# Patient Record
Sex: MALE | Race: WHITE | NOT HISPANIC OR LATINO | Employment: FULL TIME | URBAN - METROPOLITAN AREA
[De-identification: names, ages, dates, MRNs, and addresses within clinical notes are randomized per-mention and may not be internally consistent; named-entity substitution may affect disease eponyms.]

---

## 2017-11-10 ENCOUNTER — HOSPITAL ENCOUNTER (OUTPATIENT)
Facility: MEDICAL CENTER | Age: 37
End: 2017-11-10
Payer: COMMERCIAL

## 2017-11-10 LAB
ANION GAP SERPL CALC-SCNC: 7 MMOL/L (ref 0–11.9)
BUN SERPL-MCNC: 21 MG/DL (ref 8–22)
CALCIUM SERPL-MCNC: 9.2 MG/DL (ref 8.5–10.5)
CHLORIDE SERPL-SCNC: 102 MMOL/L (ref 96–112)
CHOLEST SERPL-MCNC: 166 MG/DL (ref 100–199)
CO2 SERPL-SCNC: 26 MMOL/L (ref 20–33)
CREAT SERPL-MCNC: 1.19 MG/DL (ref 0.5–1.4)
GFR SERPL CREATININE-BSD FRML MDRD: >60 ML/MIN/1.73 M 2
GLUCOSE SERPL-MCNC: 319 MG/DL (ref 65–99)
HDLC SERPL-MCNC: 47 MG/DL
LDLC SERPL CALC-MCNC: 74 MG/DL
POTASSIUM SERPL-SCNC: 4.7 MMOL/L (ref 3.6–5.5)
SODIUM SERPL-SCNC: 135 MMOL/L (ref 135–145)
TRIGL SERPL-MCNC: 227 MG/DL (ref 0–149)

## 2017-12-01 ENCOUNTER — OFFICE VISIT (OUTPATIENT)
Dept: URGENT CARE | Facility: PHYSICIAN GROUP | Age: 37
End: 2017-12-01
Payer: COMMERCIAL

## 2017-12-01 VITALS
TEMPERATURE: 99.9 F | SYSTOLIC BLOOD PRESSURE: 122 MMHG | HEART RATE: 97 BPM | OXYGEN SATURATION: 99 % | BODY MASS INDEX: 24.39 KG/M2 | WEIGHT: 184 LBS | RESPIRATION RATE: 16 BRPM | HEIGHT: 73 IN | DIASTOLIC BLOOD PRESSURE: 76 MMHG

## 2017-12-01 DIAGNOSIS — R68.89 FLU-LIKE SYMPTOMS: Primary | ICD-10-CM

## 2017-12-01 LAB
FLUAV+FLUBV AG SPEC QL IA: NEGATIVE
INT CON NEG: NORMAL
INT CON POS: NORMAL

## 2017-12-01 PROCEDURE — 87804 INFLUENZA ASSAY W/OPTIC: CPT | Performed by: NURSE PRACTITIONER

## 2017-12-01 PROCEDURE — 99203 OFFICE O/P NEW LOW 30 MIN: CPT | Performed by: NURSE PRACTITIONER

## 2017-12-01 RX ORDER — OSELTAMIVIR PHOSPHATE 75 MG/1
75 CAPSULE ORAL 2 TIMES DAILY
Qty: 10 CAP | Refills: 0 | Status: SHIPPED | OUTPATIENT
Start: 2017-12-01 | End: 2018-02-07

## 2017-12-01 RX ORDER — FLUTICASONE PROPIONATE 50 MCG
1 SPRAY, SUSPENSION (ML) NASAL DAILY
COMMUNITY

## 2017-12-01 RX ORDER — PSEUDOEPHEDRINE HCL 30 MG
60 TABLET ORAL EVERY 4 HOURS PRN
COMMUNITY
End: 2018-02-07

## 2017-12-01 RX ORDER — LORATADINE 10 MG/1
10 TABLET ORAL DAILY
COMMUNITY
End: 2024-02-24

## 2017-12-01 ASSESSMENT — ENCOUNTER SYMPTOMS
VOMITING: 0
FEVER: 1
COUGH: 1
MYALGIAS: 1
RHINORRHEA: 1
DIARRHEA: 0
CHILLS: 1
WEAKNESS: 1
SORE THROAT: 1
SHORTNESS OF BREATH: 0
NAUSEA: 0
SPUTUM PRODUCTION: 1

## 2017-12-01 ASSESSMENT — COPD QUESTIONNAIRES: COPD: 0

## 2017-12-01 NOTE — PATIENT INSTRUCTIONS
"Influenza, Adult  Influenza (\"the flu\") is a viral infection of the respiratory tract. It occurs more often in winter months because people spend more time in close contact with one another. Influenza can make you feel very sick. Influenza easily spreads from person to person (contagious).  CAUSES   Influenza is caused by a virus that infects the respiratory tract. You can catch the virus by breathing in droplets from an infected person's cough or sneeze. You can also catch the virus by touching something that was recently contaminated with the virus and then touching your mouth, nose, or eyes.  RISKS AND COMPLICATIONS  You may be at risk for a more severe case of influenza if you smoke cigarettes, have diabetes, have chronic heart disease (such as heart failure) or lung disease (such as asthma), or if you have a weakened immune system. Elderly people and pregnant women are also at risk for more serious infections. The most common problem of influenza is a lung infection (pneumonia). Sometimes, this problem can require emergency medical care and may be life threatening.  SIGNS AND SYMPTOMS   Symptoms typically last 4 to 10 days and may include:  · Fever.  · Chills.  · Headache, body aches, and muscle aches.  · Sore throat.  · Chest discomfort and cough.  · Poor appetite.  · Weakness or feeling tired.  · Dizziness.  · Nausea or vomiting.  DIAGNOSIS   Diagnosis of influenza is often made based on your history and a physical exam. A nose or throat swab test can be done to confirm the diagnosis.  TREATMENT   In mild cases, influenza goes away on its own. Treatment is directed at relieving symptoms. For more severe cases, your health care provider may prescribe antiviral medicines to shorten the sickness. Antibiotic medicines are not effective because the infection is caused by a virus, not by bacteria.  HOME CARE INSTRUCTIONS  · Take medicines only as directed by your health care provider.  · Use a cool mist humidifier " to make breathing easier.  · Get plenty of rest until your temperature returns to normal. This usually takes 3 to 4 days.  · Drink enough fluid to keep your urine clear or pale yellow.  · Cover your mouth and nose when coughing or sneezing, and wash your hands well to prevent the virus from spreading.  · Stay home from work or school until the fever is gone for at least 1 full day.  PREVENTION   An annual influenza vaccination (flu shot) is the best way to avoid getting influenza. An annual flu shot is now routinely recommended for all adults in the [place]  SEEK MEDICAL CARE IF:  · You experience chest pain, your cough worsens, or you produce more mucus.  · You have nausea, vomiting, or diarrhea.  · Your fever returns or gets worse.  SEEK IMMEDIATE MEDICAL CARE IF:  · You have trouble breathing, you become short of breath, or your skin or nails become bluish.  · You have severe pain or stiffness in the neck.  · You develop a sudden headache, or pain in the face or ear.  · You have nausea or vomiting that you cannot control.  MAKE SURE YOU:   · Understand these instructions.  · Will watch your condition.  · Will get help right away if you are not doing well or get worse.     This information is not intended to replace advice given to you by your health care provider. Make sure you discuss any questions you have with your health care provider.     Document Released: 12/15/2001 Document Revised: 01/08/2016 Document Reviewed: 03/18/2013  [employer] Interactive Patient Education ©2016 [employer]

## 2017-12-01 NOTE — PROGRESS NOTES
"Subjective:      Jose De Jesus Banda is a 36 y.o. male who presents with Cough (congestion, fever x 2 days)            Medications, Allergies and Prior Medical Hx reviewed and updated in [facility].with patient/family today           Cough   This is a new problem. The current episode started in the past 7 days (2 days). The problem has been unchanged. The cough is productive of sputum. Associated symptoms include chills, a fever, myalgias, nasal congestion, rhinorrhea and a sore throat. Pertinent negatives include no ear pain or shortness of breath. Nothing aggravates the symptoms. He has tried nothing for the symptoms. The treatment provided no relief. There is no history of asthma, COPD or emphysema.       Review of Systems   Constitutional: Positive for chills, fever and malaise/fatigue.   HENT: Positive for congestion, rhinorrhea and sore throat. Negative for ear discharge and ear pain.    Respiratory: Positive for cough and sputum production. Negative for shortness of breath.    Gastrointestinal: Negative for diarrhea, nausea and vomiting.   Musculoskeletal: Positive for myalgias.   Neurological: Positive for weakness.          Objective:     /76   Pulse 97   Temp 37.7 °C (99.9 °F)   Resp 16   Ht 1.854 m (6' 1\")   Wt 83.5 kg (184 lb)   SpO2 99%   BMI 24.28 kg/m²      Physical Exam   Constitutional: He appears well-developed and well-nourished. No distress.   HENT:   Head: Normocephalic and atraumatic.   Right Ear: Tympanic membrane and ear canal normal.   Left Ear: Tympanic membrane and ear canal normal.   Nose: Rhinorrhea present.   Mouth/Throat: Uvula is midline and mucous membranes are normal. No trismus in the jaw. No uvula swelling. Posterior oropharyngeal edema and posterior oropharyngeal erythema present. No oropharyngeal exudate.   Eyes: Conjunctivae are normal. Pupils are equal, round, and reactive to light.   Neck: Neck supple.   Cardiovascular: Normal rate, regular rhythm and normal heart sounds.  "   Pulmonary/Chest: Effort normal and breath sounds normal. No respiratory distress. He has no wheezes.   Lymphadenopathy:     He has cervical adenopathy.   Neurological: He is alert.   Skin: Skin is warm and dry.   Psychiatric: He has a normal mood and affect. His behavior is normal.   Vitals reviewed.              Assessment/Plan:       1. Flu-like symptoms  POCT Influenza A/B    oseltamivir (TAMIFLU) 75 MG Cap       poct flu - neg    Flu swab neg - sx are compatible with the flu, onset of sx 2 days will tx as flu     Letter written for 2-3 days off of school/work    Modified Epic generated written imformation provided along with verbal instructions    Rest, Fluids, tylenol, ibuprofen,  humidified air, and otc decongestants  Pt will go to the ER for worsening or changing symptoms as discussed,   Follow-up with your primary care provider or return here if not improving in 5 - 7 days   Discharge instructions discussed with pt/family who verbalize understanding and agreement with poc

## 2017-12-01 NOTE — LETTER
December 1, 2017         Patient: Jose De Jesus Banda   YOB: 1980   Date of Visit: 12/1/2017           To Whom it May Concern:    Jose De Jesus Banda was seen in my clinic on 12/1/2017. He should be off work for 2-3 days due to illness.     If you have any questions or concerns, please don't hesitate to call.        Sincerely,           JOEL Lewis.  Electronically Signed

## 2018-02-07 ENCOUNTER — OFFICE VISIT (OUTPATIENT)
Dept: MEDICAL GROUP | Facility: PHYSICIAN GROUP | Age: 38
End: 2018-02-07
Payer: COMMERCIAL

## 2018-02-07 VITALS
DIASTOLIC BLOOD PRESSURE: 80 MMHG | SYSTOLIC BLOOD PRESSURE: 112 MMHG | OXYGEN SATURATION: 98 % | WEIGHT: 177 LBS | HEART RATE: 78 BPM | RESPIRATION RATE: 16 BRPM | HEIGHT: 73 IN | TEMPERATURE: 97.6 F | BODY MASS INDEX: 23.46 KG/M2

## 2018-02-07 DIAGNOSIS — Z76.89 ENCOUNTER TO ESTABLISH CARE WITH NEW DOCTOR: ICD-10-CM

## 2018-02-07 DIAGNOSIS — E11.8 TYPE 2 DIABETES MELLITUS WITH COMPLICATION, WITHOUT LONG-TERM CURRENT USE OF INSULIN (HCC): ICD-10-CM

## 2018-02-07 DIAGNOSIS — E78.1 HYPERTRIGLYCERIDEMIA: ICD-10-CM

## 2018-02-07 DIAGNOSIS — Z23 NEED FOR VACCINATION: ICD-10-CM

## 2018-02-07 LAB
HBA1C MFR BLD: 13 % (ref ?–5.8)
INT CON NEG: NEGATIVE
INT CON POS: POSITIVE

## 2018-02-07 PROCEDURE — 83036 HEMOGLOBIN GLYCOSYLATED A1C: CPT | Performed by: NURSE PRACTITIONER

## 2018-02-07 PROCEDURE — 99214 OFFICE O/P EST MOD 30 MIN: CPT | Mod: 25 | Performed by: NURSE PRACTITIONER

## 2018-02-07 PROCEDURE — 90715 TDAP VACCINE 7 YRS/> IM: CPT | Performed by: NURSE PRACTITIONER

## 2018-02-07 PROCEDURE — 90471 IMMUNIZATION ADMIN: CPT | Performed by: NURSE PRACTITIONER

## 2018-02-07 ASSESSMENT — PATIENT HEALTH QUESTIONNAIRE - PHQ9: CLINICAL INTERPRETATION OF PHQ2 SCORE: 0

## 2018-02-07 NOTE — ASSESSMENT & PLAN NOTE
This is a new problem.  Has wellness labs done for work every year and his blood sugar is always high.  This year it was 319.  He reports that both of his parents have diabetes.   Discussed that he has diabetes, and that he will need to take this more seriously.  A POCT A1c was 13%.  He will start metformin 1000 twice a day, and he will start checking his blood sugars as needed.  He was given a glucometer and instructed how to use it. He will plan to recheck labs in 3 months.

## 2018-02-07 NOTE — PROGRESS NOTES
Chief Complaint   Patient presents with   • Follow-Up     lab work    • Tired   • Immunizations     tdap       HISTORY OF PRESENT ILLNESS: Patient is a 37 y.o. male new patient who presents today to discuss the following issues:    Need for vaccination  Due for Tdap.    Encounter to establish care with new doctor  Is here to establish with a new primary care provider.     Type 2 diabetes mellitus with complication, without long-term current use of insulin (CMS-HCC)  This is a new problem.  Has wellness labs done for work every year and his blood sugar is always high.  This year it was 319.  He reports that both of his parents have diabetes.   Discussed that he has diabetes, and that he will need to take this more seriously.  A POCT A1c was 13%.  He will start metformin 1000 twice a day, and he will start checking his blood sugars as needed.  He was given a glucometer and instructed how to use it. He will plan to recheck labs in 3 months.    Hypertriglyceridemia  Triglycerides were 227 this year.  Discussed diet and lifestyle changes.  He will recheck labs in 3 months.      Patient Active Problem List    Diagnosis Date Noted   • Encounter to establish care with new doctor 02/07/2018   • Need for vaccination 02/07/2018   • Type 2 diabetes mellitus with complication, without long-term current use of insulin (CMS-HCC) 02/07/2018   • Hypertriglyceridemia 02/07/2018       Allergies:Patient has no known allergies.    Current Outpatient Prescriptions   Medication Sig Dispense Refill   • metformin (GLUCOPHAGE) 1000 MG tablet Take 1 Tab by mouth 2 times a day, with meals. 60 Tab 3   • glucose blood (JACQUELINE CONTOUR NEXT TEST) strip 1 Strip by Other route as needed. 100 Strip 3   • loratadine (CLARITIN) 10 MG Tab Take 10 mg by mouth every day.     • fluticasone (FLONASE) 50 MCG/ACT nasal spray Spray 1 Spray in nose every day.       No current facility-administered medications for this visit.        Social History   Substance  "Use Topics   • Smoking status: Never Smoker   • Smokeless tobacco: Never Used   • Alcohol use Yes       No family status information on file.   History reviewed. No pertinent family history.    Review of Systems:   Constitutional: Negative for fever, chills, weight loss and malaise/fatigue.   HENT: Negative for ear pain, nosebleeds, congestion, sore throat and neck pain.    Eyes: Negative for blurred vision.   Respiratory: Negative for cough, sputum production, shortness of breath and wheezing.    Cardiovascular: Negative for chest pain, palpitations, orthopnea and leg swelling.   Gastrointestinal: Negative for heartburn, nausea, vomiting and abdominal pain.   Genitourinary: Negative for dysuria, urgency and frequency.   Musculoskeletal: Negative for myalgias, joint pain, and back pain.  Skin: Negative for rash and itching.   Neurological: Negative for dizziness, tingling, tremors, sensory change, focal weakness and headaches.   Endo/Heme/Allergies: Does not bruise/bleed easily.   Psychiatric/Behavioral: Negative for depression, suicidal ideas and memory loss.  The patient is not nervous/anxious and does not have insomnia.    All other systems reviewed and are negative except as in HPI.    Exam:  Blood pressure 112/80, pulse 78, temperature 36.4 °C (97.6 °F), resp. rate 16, height 1.854 m (6' 1\"), weight 80.3 kg (177 lb), SpO2 98 %.  General:  Well nourished, well developed male in NAD  Head: Grossly normal.  Neck: Supple without JVD or bruit. Thyroid is not enlarged.  Pulmonary: Clear to ausculation. Normal effort. No rales, ronchi, or wheezing.  Cardiovascular: Regular rate and rhythm without murmur.   Abdomen:  Bowel sounds + x 4. Soft, non-tender, nondistended.  Extremities: No clubbing, cyanosis, or edema.  Skin: Intact with no obvious rashes or lesions.  Neuro: Grossly intact.  Psych: Alert and oriented x 3.  Mood and affect appropriate.    Medical decision-making and discussion: Jose De Jesus is here to establish " with a new primary care provider.  We reviewed his past medical history and discussed his current medications. A POCT A1c was 13%.  A prescription for metformin was sent to his pharmacy, he was given a glucometer, lab work was ordered to be done in 3 months, and he was given a Tdap.  He will sign a records release for his previous provider, he will sign up with MyChart, and he will plan to follow-up here as needed.     I have placed the below orders and discussed them with an approved delegating provider. The MA is performing the below orders under the direction of Dr. Greer, who have provided verbal consent for supervision.    Time: Greater than 50% of this 50 minute appointment was spent face-to-face providing counseling, coordination of care, etc.            Assessment/Plan:  1. Need for vaccination  TDAP VACCINE =>6YO IM   2. Encounter to establish care with new doctor     3. Type 2 diabetes mellitus with complication, without long-term current use of insulin (CMS-[facility])  metformin (GLUCOPHAGE) 1000 MG tablet    POCT Hemoglobin A1C    glucose blood (JACQUELINE CONTOUR NEXT TEST) strip    CBC WITH DIFFERENTIAL    COMP METABOLIC PANEL    HEMOGLOBIN A1C    LIPID PROFILE   4. Hypertriglyceridemia  LIPID PROFILE       Return in about 3 months (around 5/7/2018), or if symptoms worsen or fail to improve.    Please note that this dictation was created using voice recognition software. I have made every reasonable attempt to correct obvious errors, but I expect that there are errors of grammar and possibly content that I did not discover before finalizing the note.

## 2018-02-07 NOTE — ASSESSMENT & PLAN NOTE
Triglycerides were 227 this year.  Discussed diet and lifestyle changes.  He will recheck labs in 3 months.

## 2018-05-09 LAB — HBA1C MFR BLD: 11.4 % (ref ?–5.8)

## 2018-05-11 LAB
ALBUMIN SERPL-MCNC: 4.4 G/DL (ref 3.5–5.5)
ALBUMIN/GLOB SERPL: 1.9 {RATIO} (ref 1.2–2.2)
ALP SERPL-CCNC: 44 IU/L (ref 39–117)
ALT SERPL-CCNC: 25 IU/L (ref 0–44)
AST SERPL-CCNC: 19 IU/L (ref 0–40)
BASOPHILS # BLD AUTO: 0 X10E3/UL (ref 0–0.2)
BASOPHILS NFR BLD AUTO: 0 %
BILIRUB SERPL-MCNC: 0.5 MG/DL (ref 0–1.2)
BUN SERPL-MCNC: 18 MG/DL (ref 6–20)
BUN/CREAT SERPL: 16 (ref 9–20)
CALCIUM SERPL-MCNC: 9.1 MG/DL (ref 8.7–10.2)
CHLORIDE SERPL-SCNC: 100 MMOL/L (ref 96–106)
CHOLEST SERPL-MCNC: 171 MG/DL (ref 100–199)
CO2 SERPL-SCNC: 25 MMOL/L (ref 18–29)
CREAT SERPL-MCNC: 1.11 MG/DL (ref 0.76–1.27)
EOSINOPHIL # BLD AUTO: 0.1 X10E3/UL (ref 0–0.4)
EOSINOPHIL NFR BLD AUTO: 2 %
ERYTHROCYTE [DISTWIDTH] IN BLOOD BY AUTOMATED COUNT: 13.4 % (ref 12.3–15.4)
GFR SERPLBLD CREATININE-BSD FMLA CKD-EPI: 86 ML/MIN/1.73
GFR SERPLBLD CREATININE-BSD FMLA CKD-EPI: 99 ML/MIN/1.73
GLOBULIN SER CALC-MCNC: 2.3 G/DL (ref 1.5–4.5)
GLUCOSE SERPL-MCNC: 306 MG/DL (ref 65–99)
HBA1C MFR BLD: 11.4 % (ref 4.8–5.6)
HCT VFR BLD AUTO: 45.8 % (ref 37.5–51)
HDLC SERPL-MCNC: 45 MG/DL
HGB BLD-MCNC: 15.1 G/DL (ref 13–17.7)
IMM GRANULOCYTES # BLD: 0 X10E3/UL (ref 0–0.1)
IMM GRANULOCYTES NFR BLD: 0 %
IMMATURE CELLS  115398: NORMAL
LABORATORY COMMENT REPORT: ABNORMAL
LDLC SERPL CALC-MCNC: 93 MG/DL (ref 0–99)
LYMPHOCYTES # BLD AUTO: 2.4 X10E3/UL (ref 0.7–3.1)
LYMPHOCYTES NFR BLD AUTO: 46 %
MCH RBC QN AUTO: 30.7 PG (ref 26.6–33)
MCHC RBC AUTO-ENTMCNC: 33 G/DL (ref 31.5–35.7)
MCV RBC AUTO: 93 FL (ref 79–97)
MONOCYTES # BLD AUTO: 0.4 X10E3/UL (ref 0.1–0.9)
MONOCYTES NFR BLD AUTO: 8 %
MORPHOLOGY BLD-IMP: NORMAL
NEUTROPHILS # BLD AUTO: 2.3 X10E3/UL (ref 1.4–7)
NEUTROPHILS NFR BLD AUTO: 44 %
NRBC BLD AUTO-RTO: NORMAL %
PLATELET # BLD AUTO: 207 X10E3/UL (ref 150–379)
POTASSIUM SERPL-SCNC: 4.3 MMOL/L (ref 3.5–5.2)
PROT SERPL-MCNC: 6.7 G/DL (ref 6–8.5)
RBC # BLD AUTO: 4.92 X10E6/UL (ref 4.14–5.8)
SODIUM SERPL-SCNC: 138 MMOL/L (ref 134–144)
TRIGL SERPL-MCNC: 167 MG/DL (ref 0–149)
VLDLC SERPL CALC-MCNC: 33 MG/DL (ref 5–40)
WBC # BLD AUTO: 5.2 X10E3/UL (ref 3.4–10.8)

## 2018-05-22 ENCOUNTER — OFFICE VISIT (OUTPATIENT)
Dept: MEDICAL GROUP | Facility: PHYSICIAN GROUP | Age: 38
End: 2018-05-22
Payer: COMMERCIAL

## 2018-05-22 VITALS
WEIGHT: 182 LBS | OXYGEN SATURATION: 98 % | HEART RATE: 86 BPM | DIASTOLIC BLOOD PRESSURE: 74 MMHG | TEMPERATURE: 98.3 F | HEIGHT: 73 IN | SYSTOLIC BLOOD PRESSURE: 122 MMHG | BODY MASS INDEX: 24.12 KG/M2 | RESPIRATION RATE: 16 BRPM

## 2018-05-22 DIAGNOSIS — E11.8 TYPE 2 DIABETES MELLITUS WITH COMPLICATION, WITHOUT LONG-TERM CURRENT USE OF INSULIN (HCC): ICD-10-CM

## 2018-05-22 PROBLEM — Z76.89 ENCOUNTER TO ESTABLISH CARE WITH NEW DOCTOR: Status: RESOLVED | Noted: 2018-02-07 | Resolved: 2018-05-22

## 2018-05-22 PROBLEM — Z23 NEED FOR VACCINATION: Status: RESOLVED | Noted: 2018-02-07 | Resolved: 2018-05-22

## 2018-05-22 PROCEDURE — 99214 OFFICE O/P EST MOD 30 MIN: CPT | Performed by: NURSE PRACTITIONER

## 2018-05-22 NOTE — ASSESSMENT & PLAN NOTE
Blood sugars are typically high 200s to 300s.  Recent A1c was 11.4%, which is down from 13%.  Discussed options.  Will proceed with referral to endocrinology.

## 2018-05-22 NOTE — PROGRESS NOTES
Chief Complaint   Patient presents with   • Results     labs       HISTORY OF PRESENT ILLNESS: Patient is a 37 y.o. male established patient who presents today to discuss the following issues:    Type 2 diabetes mellitus with complication, without long-term current use of insulin (CMS-HCC) ([facility])  Blood sugars are typically high 200s to 300s.  Recent A1c was 11.4%, which is down from 13%.  Discussed options.  Will proceed with referral to endocrinology.       Patient Active Problem List    Diagnosis Date Noted   • Type 2 diabetes mellitus with complication, without long-term current use of insulin ([facility]) 02/07/2018   • Hypertriglyceridemia 02/07/2018       Allergies:Patient has no known allergies.    Current Outpatient Prescriptions   Medication Sig Dispense Refill   • metformin (GLUCOPHAGE) 1000 MG tablet TAKE 1 TAB BY MOUTH 2 TIMES A DAY, WITH MEALS. 180 Tab 1   • glucose blood (JACQUELINE CONTOUR NEXT TEST) strip 1 Strip by Other route as needed. 100 Strip 3   • loratadine (CLARITIN) 10 MG Tab Take 10 mg by mouth every day.     • fluticasone (FLONASE) 50 MCG/ACT nasal spray Spray 1 Spray in nose every day.       No current facility-administered medications for this visit.        Social History   Substance Use Topics   • Smoking status: Never Smoker   • Smokeless tobacco: Never Used   • Alcohol use Yes       No family status information on file.   History reviewed. No pertinent family history.    Review of Systems:   Constitutional: Negative for fever, chills, weight loss and malaise/fatigue.   HENT: Negative for ear pain, nosebleeds, congestion, sore throat and neck pain.    Eyes: Negative for blurred vision.   Respiratory: Negative for cough, sputum production, shortness of breath and wheezing.    Cardiovascular: Negative for chest pain, palpitations, orthopnea and leg swelling.   Gastrointestinal: Negative for heartburn, nausea, vomiting and abdominal pain.   Genitourinary: Negative for dysuria, urgency and frequency.  "  Musculoskeletal: Negative for myalgias, joint pain, and back pain.  Skin: Negative for rash and itching.   Neurological: Negative for dizziness, tingling, tremors, sensory change, focal weakness and headaches.   Endo/Heme/Allergies: Does not bruise/bleed easily.   Psychiatric/Behavioral: Negative for depression, suicidal ideas and memory loss.  The patient is not nervous/anxious and does not have insomnia.    All other systems reviewed and are negative except as in HPI.    Exam:  Blood pressure 122/74, pulse 86, temperature 36.8 °C (98.3 °F), resp. rate 16, height 1.854 m (6' 1\"), weight 82.6 kg (182 lb), SpO2 98 %.  General:  Well nourished, well developed male in NAD  Head: Grossly normal.  Neck: Supple without JVD or bruit. Thyroid is not enlarged.  Pulmonary: Clear to ausculation. Normal effort. No rales, ronchi, or wheezing.  Cardiovascular: Regular rate and rhythm without murmur.   Abdomen:  Soft, nontender, nondistended.  Extremities: No clubbing, cyanosis, or edema.  Skin: Intact with no obvious rashes or lesions.  Neuro: Grossly intact.  Psych: Alert and oriented x 3.  Mood and affect appropriate.    Medical decision-making and discussion: Jose De Jesus is here today for follow-up.  We reviewed recent lab results and a referral was sent to endocrinology.  He will follow-up here as needed.          Assessment/Plan:  1. Type 2 diabetes mellitus with complication, without long-term current use of insulin (HCC)  REFERRAL TO ENDOCRINOLOGY       Return if symptoms worsen or fail to improve.    Please note that this dictation was created using voice recognition software. I have made every reasonable attempt to correct obvious errors, but I expect that there are errors of grammar and possibly content that I did not discover before finalizing the note.  "

## 2018-06-01 DIAGNOSIS — E11.8 TYPE 2 DIABETES MELLITUS WITH COMPLICATION, WITHOUT LONG-TERM CURRENT USE OF INSULIN (HCC): ICD-10-CM

## 2018-06-01 NOTE — TELEPHONE ENCOUNTER
Patient has recently been seen by PCP within the last 6 months per protocol (5/18). Will refill medications for 6 months.  Lab Results   Component Value Date/Time    HBA1C 11.4 (H) 05/09/2018 07:34 AM    HBA1C 11.4 05/09/2018      No results found for: MICROALBCALC, MALBCRT, MALBEXCR, IFTGDY98, MICROALBUR, MICRALB, UMICROALBUM, MICROALBTIM   Lab Results   Component Value Date/Time    ALKPHOSPHAT 44 05/09/2018 07:34 AM    ASTSGOT 19 05/09/2018 07:34 AM    ALTSGPT 25 05/09/2018 07:34 AM    TBILIRUBIN 0.5 05/09/2018 07:34 AM

## 2018-06-06 DIAGNOSIS — E11.8 TYPE 2 DIABETES MELLITUS WITH COMPLICATION, WITHOUT LONG-TERM CURRENT USE OF INSULIN (HCC): ICD-10-CM

## 2018-07-23 ENCOUNTER — OFFICE VISIT (OUTPATIENT)
Dept: ENDOCRINOLOGY | Facility: MEDICAL CENTER | Age: 38
End: 2018-07-23
Payer: COMMERCIAL

## 2018-07-23 VITALS
DIASTOLIC BLOOD PRESSURE: 82 MMHG | WEIGHT: 182.2 LBS | OXYGEN SATURATION: 97 % | HEIGHT: 73 IN | HEART RATE: 74 BPM | BODY MASS INDEX: 24.15 KG/M2 | SYSTOLIC BLOOD PRESSURE: 130 MMHG

## 2018-07-23 DIAGNOSIS — E11.8 TYPE 2 DIABETES MELLITUS WITH COMPLICATION, WITHOUT LONG-TERM CURRENT USE OF INSULIN (HCC): ICD-10-CM

## 2018-07-23 LAB
HBA1C MFR BLD: 10.4 % (ref ?–5.8)
INT CON NEG: NORMAL
INT CON POS: NORMAL

## 2018-07-23 PROCEDURE — 83036 HEMOGLOBIN GLYCOSYLATED A1C: CPT | Performed by: PHYSICIAN ASSISTANT

## 2018-07-23 PROCEDURE — 99203 OFFICE O/P NEW LOW 30 MIN: CPT | Performed by: PHYSICIAN ASSISTANT

## 2018-07-23 NOTE — PATIENT INSTRUCTIONS
STOP:  1.  Metformin    START  1.  Xigduo 5/1000 one in the AM one int eh PM  2.  Ozempic 0.25 for three weeks once a week.    Get eyes checked

## 2018-07-23 NOTE — PROGRESS NOTES
New Patient Consult Note  Referred by: IZZY Zelaya    Reason for consult: Diabetes Management Type 2    HPI:  Jose De Jesus Banda is a 37 y.o. old patient who is seeing us today for diabetes care.  This is a pleasant patient with diabetes and I appreciate the opportunity to participate in the care of this patient.  This is a new patient with me today.    Labs of 7/23/18 HbA1c 10.4  Labs of 5/9/18 HbA1c is 11.4, HDL 45, LDL 93, GFR 86, glucose 306  Labs 2/7/18 HbA1c was 13.0  Labs of 10/11/13 glucose 317  Labs of 10/19/12 glucose 247    BG Diary:7/23/2018  In the AM:  No log    Has been Diabetic since T2 2/2018  Has a Glucagon pen at home: no    1. Type 2 diabetes mellitus with complication, without long-term current use of insulin ([facility])  This is a new patient with me on 7/23/18   He is on   1. Metformin 1000 BID    STOP:  1.  Metformin    START  1.  Xigduo 5/1000 one in the AM one int eh PM  2.  Ozempic 0.25 for three weeks once a week.    Get eyes checked      ROS:   Constitutional: No change in weight , No fatigue, No night sweats.  HEENT: No Headache.  Eyes:  No blurred vision, No visual changes.  Cardiac: No chest pain, No palpitations.  Resp: No shortness of breath, No cough,   Gastro: No nausea or vomiting, No diarrhea.  Neuro: Denies numbness or tinging in bilateral feet or hands, and no loss of sensation.  Endo: No heat or cold intolerance.  : No polyuria, No polydipsia, No chronic UTI's.  Lower extremities: No lower leg edema bilateral.  All other systems were reviewed and were negative.    Past Medical History:  Patient Active Problem List    Diagnosis Date Noted   • Type 2 diabetes mellitus with complication, without long-term current use of insulin (HCC) 02/07/2018   • Hypertriglyceridemia 02/07/2018       Past Surgical History:  No past surgical history on file.    Allergies:  Patient has no known allergies.    Social History:  Social History     Social History   • Marital status: Unknown      "Spouse name: N/A   • Number of children: N/A   • Years of education: N/A     Occupational History   • Not on file.     Social History Main Topics   • Smoking status: Never Smoker   • Smokeless tobacco: Never Used   • Alcohol use Yes   • Drug use: No   • Sexual activity: Yes     Partners: Female     Other Topics Concern   • Not on file     Social History Narrative   • No narrative on file       Family History:  No family history on file.    Medications:    Current Outpatient Prescriptions:   •  MILK THISTLE PLUS PO, Take  by mouth., Disp: , Rfl:   •  S-Adenosylmethionine (THEODORA-E PO), Take  by mouth., Disp: , Rfl:   •  metformin (GLUCOPHAGE) 1000 MG tablet, Take 1 Tab by mouth 2 times a day, with meals., Disp: 180 Tab, Rfl: 1  •  glucose blood (JACQUELINE CONTOUR NEXT TEST) strip, 1 Strip by Other route as needed., Disp: 100 Strip, Rfl: 3  •  loratadine (CLARITIN) 10 MG Tab, Take 10 mg by mouth every day., Disp: , Rfl:   •  fluticasone (FLONASE) 50 MCG/ACT nasal spray, Spray 1 Spray in nose every day., Disp: , Rfl:       Physical Examination:   Vital signs: /82   Pulse 74   Ht 1.854 m (6' 0.99\")   Wt 82.6 kg (182 lb 3.2 oz)   SpO2 97%   BMI 24.04 kg/m²   General: No distress, cooperative, well dressed and well nourished.   Eyes: No scleral icterus or discharge, No hyposphagma  ENMT: Normal on external inspection of nose, lips, No nasal drainage   Neck: No abnormal masses on inspection  Resp: Normal effort, Bilateral clear to auscultation, No wheezing, No rales  CVS: Regular rate and rhythm, S1 S2 normal, No murmur. No gallop  Extremities: No edema bilateral extremities  Neuro: Alert and oriented  Skin: No rash, No Ulcers  Psych: Normal mood and affect      Assessment and Plan:    1. Type 2 diabetes mellitus with complication, without long-term current use of insulin (HCC)    STOP:  1.  Metformin    START  1.  Xigduo 5/1000 one in the AM one in the PM  2.  Ozempic 0.25 for three weeks once a week.    Get eyes " checked    Return in about 3 weeks (around 8/13/2018).    Blood glucose log: Check BG in the morning when wake up, before lunch or dinner and before bed.  So three times a day.  Always bring BG diary to the next office visit.    The total time spent seeing this patient today face to face in consultation, and formulating an action plan for this visit was greater than 25 minutes. > Than 50% of this time was spent counseling, discussing problems documented above and below, coordinating care and answering questions by the physician assistant.  We developed a diabetes care plan for this patient today.       This patient during there office visit was started on new medication Ozempic xigduo.  Side effects of new medications were discussed with the patient today in the office. The patient was supplied paperwork on this new medication.    Thank you kindly for allowing me to participate in the diabetes care plan for this patient.    Sanchez Kumar PA-C, BC-ADM  Board Certified - Advanced Diabetes Management  07/23/18    CC:   IZZY Zelaya

## 2018-08-13 ENCOUNTER — OFFICE VISIT (OUTPATIENT)
Dept: ENDOCRINOLOGY | Facility: MEDICAL CENTER | Age: 38
End: 2018-08-13
Payer: COMMERCIAL

## 2018-08-13 VITALS
BODY MASS INDEX: 23.01 KG/M2 | WEIGHT: 173.6 LBS | HEIGHT: 73 IN | OXYGEN SATURATION: 97 % | SYSTOLIC BLOOD PRESSURE: 106 MMHG | DIASTOLIC BLOOD PRESSURE: 60 MMHG | HEART RATE: 101 BPM

## 2018-08-13 DIAGNOSIS — E11.8 TYPE 2 DIABETES MELLITUS WITH COMPLICATION, WITHOUT LONG-TERM CURRENT USE OF INSULIN (HCC): ICD-10-CM

## 2018-08-13 PROCEDURE — 99214 OFFICE O/P EST MOD 30 MIN: CPT | Performed by: PHYSICIAN ASSISTANT

## 2018-08-13 NOTE — PATIENT INSTRUCTIONS
START  1.  Xigduo 5/1000 one in the AM one int eh PM  2.  Ozempic 0.25 for three weeks once a week.  (INCREASE to 0.5)  Get eyes checked

## 2018-08-13 NOTE — PROGRESS NOTES
Return to office Patient Consult Note  Referred by: IZZY Zelaya    Reason for consult: Diabetes Management Type 2    HPI:  Jose De Jesus Banda is a 37 y.o. old patient who is seeing us today for diabetes care.  This is a pleasant patient with diabetes and I appreciate the opportunity to participate in the care of this patient.    Labs of 7/23/18 HbA1c 10.4  Labs of 5/9/18 HbA1c is 11.4, HDL 45, LDL 93, GFR 86, glucose 306  Labs 2/7/18 HbA1c was 13.0  Labs of 10/11/13 glucose 317  Labs of 10/19/12 glucose 247       BG Diary:8/13/2018  In the AM: 142, 123, 150, 138, 161, 150, 143, 140  Before Bed: 165, 161, 122, 198, 167, 120, 140       1. Type 2 diabetes mellitus with complication, without long-term current use of insulin (HCC)    This is a new patient with me on 7/23/18   He is on   1. Metformin 1000 BID     STOP:  1.  Metformin     START  1.  Xigduo 5/1000 one in the AM one int eh PM  2.  Ozempic 0.25 for three weeks once a week.    Get eyes checked      ROS:   Constitutional: No night sweats.  Eyes:  No visual changes.  Cardiac: No chest pain, No palpitations or racing heart rate.  Resp: No shortness of breath, No cough,   Gi: No Diarrhea    All other systems were reviewed and were/are negative.  The ROS was revised/revisited during this office visit from the patients first office visit with me on 7/23/18 Please review the full ROS during the first office visit.    Past Medical History:  Patient Active Problem List    Diagnosis Date Noted   • Type 2 diabetes mellitus with complication, without long-term current use of insulin (HCC) 02/07/2018   • Hypertriglyceridemia 02/07/2018       Past Surgical History:  No past surgical history on file.    Allergies:  Patient has no known allergies.    Social History:  Social History     Social History   • Marital status: Unknown     Spouse name: N/A   • Number of children: N/A   • Years of education: N/A     Occupational History   • Not on file.     Social History  "Main Topics   • Smoking status: Never Smoker   • Smokeless tobacco: Never Used   • Alcohol use Yes   • Drug use: No   • Sexual activity: Yes     Partners: Female     Other Topics Concern   • Not on file     Social History Narrative   • No narrative on file       Family History:  No family history on file.    Medications:    Current Outpatient Prescriptions:   •  MILK THISTLE PLUS PO, Take  by mouth., Disp: , Rfl:   •  S-Adenosylmethionine (THEODORA-E PO), Take  by mouth., Disp: , Rfl:   •  glucose blood (JACQUELINE CONTOUR NEXT TEST) strip, 1 Strip by Other route as needed., Disp: 100 Strip, Rfl: 3  •  loratadine (CLARITIN) 10 MG Tab, Take 10 mg by mouth every day., Disp: , Rfl:   •  fluticasone (FLONASE) 50 MCG/ACT nasal spray, Spray 1 Spray in nose every day., Disp: , Rfl:   •  metformin (GLUCOPHAGE) 1000 MG tablet, Take 1 Tab by mouth 2 times a day, with meals., Disp: 180 Tab, Rfl: 1        Physical Examination:   Vital signs: /60   Pulse (!) 101   Ht 1.854 m (6' 0.99\")   Wt 78.7 kg (173 lb 9.6 oz)   SpO2 97%   BMI 22.91 kg/m²   General: No distress, cooperative, well dressed and well nourished.   Eyes: No scleral icterus or discharge, No hyposphagma  ENMT: Normal on external inspection of nose, lips, No nasal drainage   Neck: No abnormal masses on inspection  Resp: Normal effort, Bilateral clear to auscultation, No wheezing, No rales  CVS: Regular rate and rhythm, S1 S2 normal, No murmur. No gallop  Extremities: No edema bilateral extremities  Neuro: Alert and oriented  Skin: No rash, No Ulcers  Psych: Normal mood and affect      Assessment and Plan:    1. Type 2 diabetes mellitus with complication, without long-term current use of insulin (HCC)    START  1.  Xigduo 5/1000 one in the AM one int eh PM  2.  Ozempic 0.25 for three weeks once a week.  (INCREASE to 0.5)  Get eyes checked    Return in about 3 weeks (around 9/3/2018).    Blood glucose log: Check BG in the morning when wake up, before lunch or dinner " and before bed.  So three times a day.  Always bring BG diary to the next office visit.     Thank you kindly for allowing me to participate in the diabetes care plan for this patient.    Sanchez Kumar PA-C, [place] [place] Certified - Advanced Diabetes Management  08/13/18    CC:   IZZY Zelaya

## 2018-09-04 ENCOUNTER — OFFICE VISIT (OUTPATIENT)
Dept: ENDOCRINOLOGY | Facility: MEDICAL CENTER | Age: 38
End: 2018-09-04
Payer: COMMERCIAL

## 2018-09-04 VITALS
OXYGEN SATURATION: 99 % | HEART RATE: 86 BPM | DIASTOLIC BLOOD PRESSURE: 76 MMHG | HEIGHT: 73 IN | SYSTOLIC BLOOD PRESSURE: 106 MMHG | BODY MASS INDEX: 22.77 KG/M2 | WEIGHT: 171.8 LBS

## 2018-09-04 DIAGNOSIS — E11.8 TYPE 2 DIABETES MELLITUS WITH COMPLICATION, WITHOUT LONG-TERM CURRENT USE OF INSULIN (HCC): ICD-10-CM

## 2018-09-04 PROCEDURE — 99214 OFFICE O/P EST MOD 30 MIN: CPT | Performed by: PHYSICIAN ASSISTANT

## 2018-09-04 RX ORDER — DAPAGLIFLOZIN AND METFORMIN HYDROCHLORIDE 5; 1000 MG/1; MG/1
TABLET, FILM COATED, EXTENDED RELEASE ORAL
COMMUNITY
End: 2018-09-04

## 2018-09-04 RX ORDER — DAPAGLIFLOZIN AND METFORMIN HYDROCHLORIDE 5; 1000 MG/1; MG/1
1 TABLET, FILM COATED, EXTENDED RELEASE ORAL 2 TIMES DAILY
Qty: 60 TAB | Refills: 11 | Status: SHIPPED | OUTPATIENT
Start: 2018-09-04 | End: 2018-09-06 | Stop reason: SDUPTHER

## 2018-09-04 NOTE — PATIENT INSTRUCTIONS
START  1.  Xigduo 5/1000 one in the AM one int eh PM  2.  Ozempic 0.5 for three weeks once a week.  (INCREASE to 1.0)

## 2018-09-04 NOTE — PROGRESS NOTES
Return to office Patient Consult Note  Referred by: IZZY Zelaya    Reason for consult: Diabetes Management Type 2    HPI:  Jose De Jesus Banda is a 37 y.o. old patient who is seeing us today for diabetes care.  This is a pleasant patient with diabetes and I appreciate the opportunity to participate in the care of this patient.    Labs of 7/23/18 HbA1c 10.4  Labs of 5/9/18 HbA1c is 11.4, HDL 45, LDL 93, GFR 86, glucose 306  Labs 2/7/18 HbA1c was 13.0  Labs of 10/11/13 glucose 317  Labs of 10/19/12 glucose 247     BG Diary:9/4/2018  In the AM:  No log    Weight is on 182 7/23/18 and today 171      1. Type 2 diabetes mellitus with complication, without long-term current use of insulin ([facility])    This is a new patient with me on 7/23/18   He is on   1. Metformin 1000 BID     STOP:  1.  Metformin     START  1.  Xigduo 5/1000 one in the AM one int eh PM  2.  Ozempic 0.5 for three weeks once a week.    Get eyes checked         ROS:   Constitutional: No night sweats.  Eyes:  No visual changes.  Cardiac: No chest pain, No palpitations or racing heart rate.  Resp: No shortness of breath, No cough,   Gi: No Diarrhea    All other systems were reviewed and were/are negative.  The ROS was revised/revisited during this office visit from the patients first office visit with me on 7/23/18 Please review the full ROS during the first office visit.    Past Medical History:  Patient Active Problem List    Diagnosis Date Noted   • Type 2 diabetes mellitus with complication, without long-term current use of insulin (HCC) 02/07/2018   • Hypertriglyceridemia 02/07/2018       Past Surgical History:  No past surgical history on file.    Allergies:  Patient has no known allergies.    Social History:  Social History     Social History   • Marital status: Unknown     Spouse name: N/A   • Number of children: N/A   • Years of education: N/A     Occupational History   • Not on file.     Social History Main Topics   • Smoking status: Never  "Smoker   • Smokeless tobacco: Never Used   • Alcohol use Yes   • Drug use: No   • Sexual activity: Yes     Partners: Female     Other Topics Concern   • Not on file     Social History Narrative   • No narrative on file       Family History:  No family history on file.    Medications:    Current Outpatient Prescriptions:   •  Dapagliflozin-Metformin HCl ER 5-1000 MG TABLET SR 24 HR, Take 1 tablet by mouth 2 Times a Day., Disp: 60 Tab, Rfl: 11  •  Semaglutide (OZEMPIC) 1 MG/DOSE Solution Pen-injector, Inject 1 mg as instructed every 7 days., Disp: 2 PEN, Rfl: 11  •  MILK THISTLE PLUS PO, Take  by mouth., Disp: , Rfl:   •  S-Adenosylmethionine (THEODORA-E PO), Take  by mouth., Disp: , Rfl:   •  glucose blood (JACQUELINE CONTOUR NEXT TEST) strip, 1 Strip by Other route as needed., Disp: 100 Strip, Rfl: 3  •  loratadine (CLARITIN) 10 MG Tab, Take 10 mg by mouth every day., Disp: , Rfl:   •  fluticasone (FLONASE) 50 MCG/ACT nasal spray, Spray 1 Spray in nose every day., Disp: , Rfl:         Physical Examination:   Vital signs: /76   Pulse 86   Ht 1.854 m (6' 0.99\")   Wt 77.9 kg (171 lb 12.8 oz)   SpO2 99%   BMI 22.67 kg/m²   General: No distress, cooperative, well dressed and well nourished.   Eyes: No scleral icterus or discharge, No hyposphagma  ENMT: Normal on external inspection of nose, lips, No nasal drainage   Neck: No abnormal masses on inspection  Resp: Normal effort, Bilateral clear to auscultation, No wheezing, No rales  CVS: Regular rate and rhythm, S1 S2 normal, No murmur. No gallop  Extremities: No edema bilateral extremities  Neuro: Alert and oriented  Skin: No rash, No Ulcers  Psych: Normal mood and affect      Assessment and Plan:    1. Type 2 diabetes mellitus with complication, without long-term current use of insulin (HCC)    START  1.  Xigduo 5/1000 one in the AM one int eh PM  2.  Ozempic 0.5 for three weeks once a week.  (INCREASE to 1.0)            Return in about 3 months (around " 12/4/2018).    Blood glucose log: Check BG in the morning when wake up, before lunch or dinner and before bed.  So three times a day.  Always bring BG diary to the next office visit.     Thank you kindly for allowing me to participate in the diabetes care plan for this patient.    Sanchez Kumar PA-C, BC-[facility]  Board Certified - Advanced Diabetes Management  09/04/18    CC:   IZZY Zelaya

## 2018-09-06 NOTE — TELEPHONE ENCOUNTER
Was the patient seen in the last year in this department? Yes    Does patient have an active prescription for medications requested? No     Received Request Via: Pharmacy     90 day supply xigduo

## 2018-09-07 RX ORDER — DAPAGLIFLOZIN AND METFORMIN HYDROCHLORIDE 5; 1000 MG/1; MG/1
1 TABLET, FILM COATED, EXTENDED RELEASE ORAL 2 TIMES DAILY
Qty: 60 TAB | Refills: 11 | Status: SHIPPED | OUTPATIENT
Start: 2018-09-07 | End: 2018-09-10 | Stop reason: SDUPTHER

## 2018-09-10 RX ORDER — DAPAGLIFLOZIN AND METFORMIN HYDROCHLORIDE 5; 1000 MG/1; MG/1
1 TABLET, FILM COATED, EXTENDED RELEASE ORAL 2 TIMES DAILY
Qty: 60 TAB | Refills: 11 | Status: SHIPPED | OUTPATIENT
Start: 2018-09-10 | End: 2018-10-18

## 2018-10-12 ENCOUNTER — OFFICE VISIT (OUTPATIENT)
Dept: URGENT CARE | Facility: PHYSICIAN GROUP | Age: 38
End: 2018-10-12
Payer: COMMERCIAL

## 2018-10-12 ENCOUNTER — HOSPITAL ENCOUNTER (OUTPATIENT)
Facility: MEDICAL CENTER | Age: 38
End: 2018-10-12
Attending: EMERGENCY MEDICINE
Payer: COMMERCIAL

## 2018-10-12 VITALS
DIASTOLIC BLOOD PRESSURE: 72 MMHG | OXYGEN SATURATION: 100 % | WEIGHT: 161 LBS | HEART RATE: 98 BPM | BODY MASS INDEX: 21.25 KG/M2 | TEMPERATURE: 97 F | SYSTOLIC BLOOD PRESSURE: 98 MMHG

## 2018-10-12 DIAGNOSIS — R19.7 DIARRHEA OF PRESUMED INFECTIOUS ORIGIN: ICD-10-CM

## 2018-10-12 PROCEDURE — 87045 FECES CULTURE AEROBIC BACT: CPT

## 2018-10-12 PROCEDURE — 87046 STOOL CULTR AEROBIC BACT EA: CPT

## 2018-10-12 PROCEDURE — 99213 OFFICE O/P EST LOW 20 MIN: CPT | Performed by: EMERGENCY MEDICINE

## 2018-10-12 PROCEDURE — 87899 AGENT NOS ASSAY W/OPTIC: CPT

## 2018-10-12 RX ORDER — ONDANSETRON 4 MG/1
4 TABLET, ORALLY DISINTEGRATING ORAL EVERY 6 HOURS PRN
Qty: 10 TAB | Refills: 0 | Status: SHIPPED | OUTPATIENT
Start: 2018-10-12 | End: 2019-03-05

## 2018-10-12 ASSESSMENT — ENCOUNTER SYMPTOMS
DIAPHORESIS: 0
ABDOMINAL PAIN: 1
WEIGHT LOSS: 1
VOMITING: 0
NAUSEA: 0
NERVOUS/ANXIOUS: 0
COUGH: 0
FEVER: 0
MYALGIAS: 0
CHILLS: 0
CONSTIPATION: 0
NECK PAIN: 0
EYE DISCHARGE: 0
DIARRHEA: 1
SENSORY CHANGE: 0
SPEECH CHANGE: 0
EYE REDNESS: 0

## 2018-10-12 NOTE — PROGRESS NOTES
Subjective:      Jose De Jesus Banda is a 37 y.o. male who presents with GI Problem (x1 month )            HPI  Pt with diarrhea for the past 10 days , stopped Metformin and it resolved but diarrhea recurred before he stated the metformin.  Recently was in [place] in a cabin outside of time. No one else is ill.   Vomitied once .  PMH:  has a past medical history of Allergy.  MEDS:   Current Outpatient Prescriptions:   •  Semaglutide (OZEMPIC) 1 MG/DOSE Solution Pen-injector, Inject 1 mg as instructed every 7 days., Disp: 2 PEN, Rfl: 11  •  Dapagliflozin-Metformin HCl ER 5-1000 MG TABLET SR 24 HR, Take 1 tablet by mouth 2 Times a Day., Disp: 60 Tab, Rfl: 11  •  MILK THISTLE PLUS PO, Take  by mouth., Disp: , Rfl:   •  S-Adenosylmethionine (THEODORA-E PO), Take  by mouth., Disp: , Rfl:   •  glucose blood (JACQUELINE CONTOUR NEXT TEST) strip, 1 Strip by Other route as needed., Disp: 100 Strip, Rfl: 3  •  loratadine (CLARITIN) 10 MG Tab, Take 10 mg by mouth every day., Disp: , Rfl:   •  fluticasone (FLONASE) 50 MCG/ACT nasal spray, Spray 1 Spray in nose every day., Disp: , Rfl:   ALLERGIES: No Known Allergies  SURGHX: No past surgical history on file.  SOCHX:  reports that he has never smoked. He has never used smokeless tobacco. He reports that he drinks alcohol. He reports that he does not use drugs.  FH: family history is not on file.     Review of Systems   Constitutional: Positive for weight loss (states that he's lost 4 pounds.). Negative for chills, diaphoresis and fever.   Eyes: Negative for discharge and redness.   Respiratory: Negative for cough.    Cardiovascular: Negative for chest pain.   Gastrointestinal: Positive for abdominal pain and diarrhea. Negative for constipation, nausea and vomiting.   Genitourinary: Negative.    Musculoskeletal: Negative for myalgias and neck pain.   Skin: Negative for rash.   Neurological: Negative for sensory change and speech change.   Psychiatric/Behavioral: The patient is not  nervous/anxious.           Objective:     BP (!) 98/72 (BP Location: Left arm, Patient Position: Sitting, BP Cuff Size: Adult)   Pulse 98   Temp 36.1 °C (97 °F) (Temporal)   Wt 73 kg (161 lb)   SpO2 100%   BMI 21.25 kg/m²      Physical Exam   Constitutional: He appears well-developed and well-nourished. No distress.   HENT:   Head: Normocephalic and atraumatic.   Right Ear: External ear normal.   Left Ear: External ear normal.   Eyes: Conjunctivae are normal. Right eye exhibits no discharge. Left eye exhibits no discharge. No scleral icterus.   Neck: Normal range of motion.   Cardiovascular: Normal rate.    Pulmonary/Chest: Effort normal and breath sounds normal.   Abdominal: Bowel sounds are normal. He exhibits no distension and no mass. There is no tenderness. There is no guarding.   Musculoskeletal: Normal range of motion.   Neurological: He is alert. Coordination normal.   Skin: Skin is warm and dry. He is not diaphoretic. No erythema.   Psychiatric: He has a normal mood and affect. His behavior is normal.   Nursing note and vitals reviewed.              Assessment/Plan:     DX: Diarrhea             DM  Glucose 180-190       Pt will be on clear liquids for the 24 hours , contact endocrinologist . ([place])  No dairy for 5 days Use Imodium for diarrhea, I will call with the results  . If he develops nausea or vomiting I recommend Zofran. He has electrolyte medications to take    Patient return to the urgent care or emergency department for any persistent vomiting blood in his diarrhea. I will call him with culture results. Declined a work note stating she he can work from home..

## 2018-10-13 DIAGNOSIS — R19.7 DIARRHEA OF PRESUMED INFECTIOUS ORIGIN: ICD-10-CM

## 2018-10-14 LAB
E COLI SXT1+2 STL IA: NORMAL
SIGNIFICANT IND 70042: NORMAL
SITE SITE: NORMAL
SOURCE SOURCE: NORMAL

## 2018-10-16 ENCOUNTER — TELEPHONE (OUTPATIENT)
Dept: URGENT CARE | Facility: PHYSICIAN GROUP | Age: 38
End: 2018-10-16

## 2018-10-16 LAB
BACTERIA STL CULT: NORMAL
E COLI SXT1+2 STL IA: NORMAL
SIGNIFICANT IND 70042: NORMAL
SITE SITE: NORMAL
SOURCE SOURCE: NORMAL

## 2018-10-18 RX ORDER — DAPAGLIFLOZIN 10 MG/1
1 TABLET, FILM COATED ORAL DAILY
Qty: 30 TAB | Refills: 11 | Status: SHIPPED | OUTPATIENT
Start: 2018-10-18 | End: 2019-03-05

## 2018-10-22 ENCOUNTER — OFFICE VISIT (OUTPATIENT)
Dept: MEDICAL GROUP | Facility: MEDICAL CENTER | Age: 38
End: 2018-10-22
Payer: COMMERCIAL

## 2018-10-22 ENCOUNTER — HOSPITAL ENCOUNTER (OUTPATIENT)
Dept: RADIOLOGY | Facility: MEDICAL CENTER | Age: 38
End: 2018-10-22
Attending: NURSE PRACTITIONER
Payer: COMMERCIAL

## 2018-10-22 VITALS
SYSTOLIC BLOOD PRESSURE: 100 MMHG | BODY MASS INDEX: 20.94 KG/M2 | OXYGEN SATURATION: 96 % | WEIGHT: 158 LBS | DIASTOLIC BLOOD PRESSURE: 68 MMHG | RESPIRATION RATE: 14 BRPM | TEMPERATURE: 97.9 F | HEIGHT: 73 IN | HEART RATE: 91 BPM

## 2018-10-22 DIAGNOSIS — R19.7 DIARRHEA, UNSPECIFIED TYPE: ICD-10-CM

## 2018-10-22 DIAGNOSIS — Z23 NEED FOR VACCINATION: ICD-10-CM

## 2018-10-22 DIAGNOSIS — R10.10 UPPER ABDOMINAL PAIN: ICD-10-CM

## 2018-10-22 PROCEDURE — 74018 RADEX ABDOMEN 1 VIEW: CPT

## 2018-10-22 PROCEDURE — 90686 IIV4 VACC NO PRSV 0.5 ML IM: CPT | Performed by: NURSE PRACTITIONER

## 2018-10-22 PROCEDURE — 99214 OFFICE O/P EST MOD 30 MIN: CPT | Mod: 25 | Performed by: NURSE PRACTITIONER

## 2018-10-22 PROCEDURE — 90471 IMMUNIZATION ADMIN: CPT | Performed by: NURSE PRACTITIONER

## 2018-10-22 RX ORDER — METRONIDAZOLE 500 MG/1
500 TABLET ORAL 3 TIMES DAILY
Qty: 15 TAB | Refills: 0 | Status: CANCELLED | OUTPATIENT
Start: 2018-10-22 | End: 2018-10-27

## 2018-10-22 RX ORDER — RANITIDINE 300 MG/1
300 TABLET ORAL DAILY
Qty: 90 TAB | Refills: 1 | Status: SHIPPED | OUTPATIENT
Start: 2018-10-22 | End: 2019-03-05

## 2018-10-22 NOTE — PROGRESS NOTES
"CC: Diarrhea (diarrhea, indigestion, sulfer taste in mouth, gas; periodic. Immodium helps. Tried the BRAT diet last week. Tried normal food during the week. Sleep is difficult. )        HPI:     Jose De Jesus is a [facility] patient, all problems are new to me today, presents today for the followin. Diarrhea, unspecified type/ Upper abdominal pain  Diarrhea.  No black or bloody stools.  Appetite is normal.  He has knows he is lost weight and he is unsure if this is related to new diabetes medications.  Appetite is okay however he states he is a little \"afraid to eat\" in case may produce diarrhea.  States he was \"up all night last night\" with loose stools going to the bathroom.  When he did fall asleep he found he soiled his pants.  Describes a cycle of 12 or so hours with frequent loose stools, stopped with 1 Imodium no bowel movement for 3-5 days, and then cycle repeats.  States previously he would have #1 normal bowel movement a day.  He does state however back about 2 months ago he did start to have some small amounts of GI upset and loose stools however nothing at all to the extent currently.  Here today following up for diarrhea and loose stools.  States over the last 3 weeks has been worse with her go for 5 days without a bowel movement however then will have about 12 hours of copious loose and difficult to control stools    Denies any new medications during this time.  And was actually taken off metformin in case it was contributing to the diarrhea.  No nausea vomiting  Describes a \"sulfur/egg like \"smell when he burps on occasions    Did try doing a brat type diet which his symptoms did improve however then they returned while he was still doing a diet.  States also he would eat normally and his symptoms would improve.  Does not have a significant history of constipation.    Was seen in urgent care for this last week.  Stool culture and E. coli testing were negative.  No recent travel other than staying at a " "remote cabin outside of [place].  Denies that this was well or creek water.  No one else has similar symptoms.      Current Outpatient Prescriptions   Medication Sig Dispense Refill   • ranitidine (ZANTAC) 300 MG tablet Take 1 Tab by mouth every day. 90 Tab 1   • Dapagliflozin Propanediol (FARXIGA) 10 MG Tab Take 1 tablet by mouth every day. 30 Tab 11   • ondansetron (ZOFRAN ODT) 4 MG TABLET DISPERSIBLE Take 1 Tab by mouth every 6 hours as needed for Nausea. 10 Tab 0   • Semaglutide (OZEMPIC) 1 MG/DOSE Solution Pen-injector Inject 1 mg as instructed every 7 days. 2 PEN 11   • MILK THISTLE PLUS PO Take  by mouth.     • S-Adenosylmethionine (THEODORA-E PO) Take  by mouth.     • glucose blood (JACQUELINE CONTOUR NEXT TEST) strip 1 Strip by Other route as needed. 100 Strip 3   • loratadine (CLARITIN) 10 MG Tab Take 10 mg by mouth every day.     • fluticasone (FLONASE) 50 MCG/ACT nasal spray Spray 1 Spray in nose every day.       No current facility-administered medications for this visit.      Social History   Substance Use Topics   • Smoking status: Never Smoker   • Smokeless tobacco: Never Used   • Alcohol use Yes     I reviewed patients allergies, problem list and medications today in EPIC.    ROS: Any/all pertinent positives listed in the HPI, otherwise all others reviewed are negative today.      /68 (BP Location: Right arm, Patient Position: Sitting, BP Cuff Size: Adult)   Pulse 91   Temp 36.6 °C (97.9 °F) (Temporal)   Resp 14   Ht 1.854 m (6' 1\")   Wt 71.7 kg (158 lb)   SpO2 96%   BMI 20.85 kg/m²     Exam:  Gen: Alert and oriented, No apparent distress. WDWN  Psych: A+Ox3, normal affect and mood  Skin: Warm, dry and intact. Good turgor   No rashes in visible areas.  Eye: Conjunctiva clear, lids normal  ENMT: Lips without lesions, good dentition  Lungs: Clear to auscultation bilaterally, no rales or rhonchi   Unlabored respiratory effort.   CV: Regular rate and rhythm, S1, S2. No murmurs.   No Edema  Abd: Soft " non tender, non distended.  Does have a palpable area which feels like it could be related to hard stools in the colon.  This is in between the umbilicus and the right upper quadrant.  It is nontender.  Does feel tubelike.  Normal active bowel sounds.    No Hepatosplenomegaly, No pulsatile masses.         Assessment and Plan.   37 y.o. male with the following issues.    1. Diarrhea, unspecified type  Unknown etiology.  Continue bland diet.  Referral to GI for further workup.  We will do an x-ray today to see if he has any significant underlying constipation.  He will increase the fiber in his diet.  He was given information on high-fiber diet.  - REFERRAL TO GASTROENTEROLOGY  - [id number]-[id number] VIEW; Future    2. Upper abdominal pain  Trial of that Zantac in the interim bland diet.  - ranitidine (ZANTAC) 300 MG tablet; Take 1 Tab by mouth every day.  Dispense: 90 Tab; Refill: 1    3. Need for vaccination  I have placed the below orders and discussed them with an approved delegating provider. The MA is performing the below orders under the direction of an office MD.  -Given today.  - Influenza Vaccine Quad Injection >3Y (PF)

## 2018-12-04 ENCOUNTER — OFFICE VISIT (OUTPATIENT)
Dept: ENDOCRINOLOGY | Facility: MEDICAL CENTER | Age: 38
End: 2018-12-04
Payer: COMMERCIAL

## 2018-12-04 VITALS
WEIGHT: 164 LBS | OXYGEN SATURATION: 97 % | SYSTOLIC BLOOD PRESSURE: 106 MMHG | BODY MASS INDEX: 21.74 KG/M2 | HEIGHT: 73 IN | DIASTOLIC BLOOD PRESSURE: 66 MMHG | HEART RATE: 99 BPM

## 2018-12-04 DIAGNOSIS — E11.8 TYPE 2 DIABETES MELLITUS WITH COMPLICATION, WITHOUT LONG-TERM CURRENT USE OF INSULIN (HCC): ICD-10-CM

## 2018-12-04 LAB
HBA1C MFR BLD: 7.3 % (ref ?–5.8)
INT CON NEG: NORMAL
INT CON POS: NORMAL

## 2018-12-04 PROCEDURE — 83036 HEMOGLOBIN GLYCOSYLATED A1C: CPT | Performed by: PHYSICIAN ASSISTANT

## 2018-12-04 PROCEDURE — 99214 OFFICE O/P EST MOD 30 MIN: CPT | Performed by: PHYSICIAN ASSISTANT

## 2018-12-04 RX ORDER — SODIUM, POTASSIUM,MAG SULFATES 17.5-3.13G
SOLUTION, RECONSTITUTED, ORAL ORAL
Refills: 0 | COMMUNITY
Start: 2018-10-31 | End: 2019-08-16

## 2018-12-04 RX ORDER — VANCOMYCIN HYDROCHLORIDE 125 MG/1
CAPSULE ORAL
Refills: 0 | COMMUNITY
Start: 2018-11-12 | End: 2019-08-16

## 2018-12-04 RX ORDER — DAPAGLIFLOZIN AND METFORMIN HYDROCHLORIDE 5; 1000 MG/1; MG/1
TABLET, FILM COATED, EXTENDED RELEASE ORAL
COMMUNITY
Start: 2018-11-21 | End: 2019-03-05

## 2018-12-04 NOTE — PROGRESS NOTES
Return to office Patient Consult Note  Referred by: IZZY Zelaya    Reason for consult: Diabetes Management Type 2    HPI:  Jose De Jesus Banda is a 37 y.o. old patient who is seeing us today for diabetes care.  This is a pleasant patient with diabetes and I appreciate the opportunity to participate in the care of this patient.    Labs of 12/4/18 HbA1c 7.3  Labs of 7/23/18 HbA1c 10.4  Labs of 5/9/18 HbA1c is 11.4, HDL 45, LDL 93, GFR 86, glucose 306  Labs 2/7/18 HbA1c was 13.0  Labs of 10/11/13 glucose 317  Labs of 10/19/12 glucose 247       BG Diary:12/4/2018  In the AM: 156, 119, 136, 122, 129, 136, 125, 154  Before Bed: 106, 85, 107, 111, 138, 11, 109, 159, 140    Weight is on 182 7/23/18 and today 171      1. Type 2 diabetes mellitus with complication, without long-term current use of insulin ([facility])    This is a new patient with me on 7/23/18   He is on   1. Metformin 1000 BID     STOP:  1.  Metformin     START  1.  Xigduo 5/1000 one in the AM one int eh PM  2.  Ozempic 1.0 once a week    Get eyes checked       ROS:   Constitutional: No night sweats.  Eyes:  No visual changes.  Cardiac: No chest pain, No palpitations or racing heart rate.  Resp: No shortness of breath, No cough,   Gi: No Diarrhea    All other systems were reviewed and were/are negative.  The ROS was revised/revisited during this office visit from the patients first office visit with me on 7/23/18. Please review the full ROS during the first office visit.    Past Medical History:  Patient Active Problem List    Diagnosis Date Noted   • Type 2 diabetes mellitus with complication, without long-term current use of insulin ([facility]) 02/07/2018   • Hypertriglyceridemia 02/07/2018       Past Surgical History:  No past surgical history on file.    Allergies:  Patient has no known allergies.    Social History:  Social History     Social History   • Marital status:      Spouse name: N/A   • Number of children: N/A   • Years of education: N/A  "    Occupational History   • Not on file.     Social History Main Topics   • Smoking status: Never Smoker   • Smokeless tobacco: Never Used   • Alcohol use Yes   • Drug use: No   • Sexual activity: Yes     Partners: Female     Other Topics Concern   • Not on file     Social History Narrative   • No narrative on file       Family History:  No family history on file.    Medications:    Current Outpatient Prescriptions:   •  XIGDUO XR 5-1000 MG TABLET SR 24 HR, , Disp: , Rfl:   •  SUPREP BOWEL PREP KIT 17.5-3.13-1.6 GM/180ML Solution, USE (S) BY MOUTH ONCE, Disp: , Rfl: 0  •  vancomycin (VANCOCIN) 125 MG capsule, TAKE 1 CAPSULE BY MOUTH 4 TIMES A DAY, Disp: , Rfl: 0  •  ranitidine (ZANTAC) 300 MG tablet, Take 1 Tab by mouth every day. (Patient not taking: Reported on 12/4/2018), Disp: 90 Tab, Rfl: 1  •  Dapagliflozin Propanediol (FARXIGA) 10 MG Tab, Take 1 tablet by mouth every day. (Patient not taking: Reported on 12/4/2018), Disp: 30 Tab, Rfl: 11  •  ondansetron (ZOFRAN ODT) 4 MG TABLET DISPERSIBLE, Take 1 Tab by mouth every 6 hours as needed for Nausea. (Patient not taking: Reported on 12/4/2018), Disp: 10 Tab, Rfl: 0  •  Semaglutide (OZEMPIC) 1 MG/DOSE Solution Pen-injector, Inject 1 mg as instructed every 7 days., Disp: 2 PEN, Rfl: 11  •  MILK THISTLE PLUS PO, Take  by mouth., Disp: , Rfl:   •  S-Adenosylmethionine (THEODORA-E PO), Take  by mouth., Disp: , Rfl:   •  glucose blood (JACQUELINE CONTOUR NEXT TEST) strip, 1 Strip by Other route as needed., Disp: 100 Strip, Rfl: 3  •  loratadine (CLARITIN) 10 MG Tab, Take 10 mg by mouth every day., Disp: , Rfl:   •  fluticasone (FLONASE) 50 MCG/ACT nasal spray, Spray 1 Spray in nose every day., Disp: , Rfl:         Physical Examination:   Vital signs: /66 (BP Location: Right arm, Patient Position: Sitting, BP Cuff Size: Adult)   Pulse 99   Ht 1.854 m (6' 0.99\")   Wt 74.4 kg (164 lb)   SpO2 97%   BMI 21.64 kg/m²   General: No distress, cooperative, well dressed and well " nourished.   Eyes: No scleral icterus or discharge, No hyposphagma  ENMT: Normal on external inspection of nose, lips, No nasal drainage   Neck: No abnormal masses on inspection  Resp: Normal effort, Bilateral clear to auscultation, No wheezing, No rales  CVS: Regular rate and rhythm, S1 S2 normal, No murmur. No gallop  Extremities: No edema bilateral extremities  Neuro: Alert and oriented  Skin: No rash, No Ulcers  Psych: Normal mood and affect      Assessment and Plan:    1. Type 2 diabetes mellitus with complication, without long-term current use of insulin (HCC)    Now on:  1.  Xigduo 5/1000 one in the AM one int eh PM  2.  Ozempic 1.0 once a week    Get eyes checked    Return in about 3 months (around 3/4/2019).    Blood glucose log: Check BG in the morning when wake up, before lunch or dinner and before bed.  So three times a day.  Always bring BG diary to the next office visit.       Thank you kindly for allowing me to participate in the diabetes care plan for this patient.    Sanchez Kumar PA-C, BC-[facility]  Board Certified - Advanced Diabetes Management  12/04/18    CC:   Master Veras [email or web address].

## 2019-02-28 ENCOUNTER — TELEPHONE (OUTPATIENT)
Dept: ENDOCRINOLOGY | Facility: MEDICAL CENTER | Age: 39
End: 2019-02-28

## 2019-02-28 NOTE — TELEPHONE ENCOUNTER
1. Caller Name: Jose De Jesus                                         Call Back Number: [phone] (home)         Patient approves a detailed voicemail message: no    Patient called he had received a cortisone injection last week for tendonitis in his shoulder. He has been having high blood sugar numbers since then in the evenings around 280's. He wakes up in a more normal range. Wondering if there is something he needs to do differently. Wondering how long it is going to last? Does have an appointment on 3/5. Said he would address it then.

## 2019-03-05 ENCOUNTER — OFFICE VISIT (OUTPATIENT)
Dept: ENDOCRINOLOGY | Facility: MEDICAL CENTER | Age: 39
End: 2019-03-05
Payer: COMMERCIAL

## 2019-03-05 VITALS
DIASTOLIC BLOOD PRESSURE: 88 MMHG | HEIGHT: 73 IN | OXYGEN SATURATION: 99 % | HEART RATE: 88 BPM | BODY MASS INDEX: 21.76 KG/M2 | WEIGHT: 164.2 LBS | RESPIRATION RATE: 15 BRPM | SYSTOLIC BLOOD PRESSURE: 128 MMHG

## 2019-03-05 DIAGNOSIS — E11.8 TYPE 2 DIABETES MELLITUS WITH COMPLICATION, WITHOUT LONG-TERM CURRENT USE OF INSULIN (HCC): ICD-10-CM

## 2019-03-05 LAB
HBA1C MFR BLD: 7.4 % (ref ?–5.8)
INT CON NEG: NEGATIVE
INT CON POS: POSITIVE

## 2019-03-05 PROCEDURE — 83036 HEMOGLOBIN GLYCOSYLATED A1C: CPT | Performed by: PHYSICIAN ASSISTANT

## 2019-03-05 PROCEDURE — 99214 OFFICE O/P EST MOD 30 MIN: CPT | Performed by: PHYSICIAN ASSISTANT

## 2019-03-05 RX ORDER — DAPAGLIFLOZIN AND METFORMIN HYDROCHLORIDE 5; 1000 MG/1; MG/1
1 TABLET, FILM COATED, EXTENDED RELEASE ORAL 2 TIMES DAILY
Qty: 180 TAB | Refills: 4 | Status: SHIPPED | OUTPATIENT
Start: 2019-03-05 | End: 2019-06-11 | Stop reason: SDUPTHER

## 2019-03-05 RX ORDER — DAPAGLIFLOZIN AND METFORMIN HYDROCHLORIDE 5; 1000 MG/1; MG/1
1 TABLET, FILM COATED, EXTENDED RELEASE ORAL 2 TIMES DAILY
Qty: 180 TAB | Refills: 4 | Status: SHIPPED | OUTPATIENT
Start: 2019-03-05 | End: 2019-03-05 | Stop reason: SDUPTHER

## 2019-03-05 ASSESSMENT — PATIENT HEALTH QUESTIONNAIRE - PHQ9: CLINICAL INTERPRETATION OF PHQ2 SCORE: 0

## 2019-03-05 NOTE — PROGRESS NOTES
Return to office Patient Consult Note  Referred by: IZZY Zelaya    Reason for consult: Diabetes Management Type 2    HPI:  Jose De Jesus Banda is a 38 y.o. old patient who is seeing us today for diabetes care.  This is a pleasant patient with diabetes and I appreciate the opportunity to participate in the care of this patient.    Labs of 3/5/19 HbA1c is 7.4  Labs of 12/4/18 HbA1c 7.3  Labs of 7/23/18 HbA1c 10.4  Labs of 5/9/18 HbA1c is 11.4, HDL 45, LDL 93, GFR 86, glucose 306  Labs 2/7/18 HbA1c was 13.0  Labs of 10/11/13 glucose 317  Labs of 10/19/12 glucose 247    BG Diary:3/5/2019  In the AM: 135, 165, 148, 172, 159, 140  Before Bed:193, 180, 195, 187, 162, 184    Weight is on 182 7/23/18 and today 171      1. Type 2 diabetes mellitus with complication, without long-term current use of insulin ([facility])    This is a new patient with me on 7/23/18   He is on   1. Metformin 1000 BID     STOP:  1.  Metformin     START  1.  Xigduo 5/1000 one in the AM one in the PM  2.  Ozempic 1.0 once a week      ROS:   Constitutional: No night sweats.  Eyes:  No visual changes.  Cardiac: No chest pain, No palpitations or racing heart rate.  Resp: No shortness of breath, No cough,   Gi: No Diarrhea    All other systems were reviewed and were/are negative.  The ROS was revised/revisited during this office visit from the patients first office visit with me on 7/23/18 Please review the full ROS during the first office visit.    Past Medical History:  Patient Active Problem List    Diagnosis Date Noted   • Type 2 diabetes mellitus with complication, without long-term current use of insulin ([facility]) 02/07/2018   • Hypertriglyceridemia 02/07/2018       Past Surgical History:  No past surgical history on file.    Allergies:  Patient has no known allergies.    Social History:  Social History     Social History   • Marital status:      Spouse name: N/A   • Number of children: N/A   • Years of education: N/A     Occupational  "History   • Not on file.     Social History Main Topics   • Smoking status: Never Smoker   • Smokeless tobacco: Never Used   • Alcohol use Yes   • Drug use: No   • Sexual activity: Yes     Partners: Female     Other Topics Concern   • Not on file     Social History Narrative   • No narrative on file       Family History:  No family history on file.    Medications:    Current Outpatient Prescriptions:   •  Semaglutide (OZEMPIC) 1 MG/DOSE Solution Pen-injector, Inject 1 mg as instructed every 7 days., Disp: 2 PEN, Rfl: 11  •  Dapagliflozin-Metformin HCl ER 5-1000 MG TABLET SR 24 HR, Take 1 tablet by mouth 2 Times a Day., Disp: 180 Tab, Rfl: 4  •  SUPREP BOWEL PREP KIT 17.5-3.13-1.6 GM/180ML Solution, USE (S) BY MOUTH ONCE, Disp: , Rfl: 0  •  vancomycin (VANCOCIN) 125 MG capsule, TAKE 1 CAPSULE BY MOUTH 4 TIMES A DAY, Disp: , Rfl: 0  •  MILK THISTLE PLUS PO, Take  by mouth., Disp: , Rfl:   •  S-Adenosylmethionine (THEODORA-E PO), Take  by mouth., Disp: , Rfl:   •  glucose blood (JACQUELINE CONTOUR NEXT TEST) strip, 1 Strip by Other route as needed., Disp: 100 Strip, Rfl: 3  •  loratadine (CLARITIN) 10 MG Tab, Take 10 mg by mouth every day., Disp: , Rfl:   •  fluticasone (FLONASE) 50 MCG/ACT nasal spray, Spray 1 Spray in nose every day., Disp: , Rfl:         Physical Examination:   Vital signs: /88 (BP Location: Right arm, Patient Position: Sitting, BP Cuff Size: Adult)   Pulse 88   Resp 15   Ht 1.854 m (6' 1\")   Wt 74.5 kg (164 lb 3.2 oz)   SpO2 99%   BMI 21.66 kg/m²   General: No distress, cooperative, well dressed and well nourished.   Eyes: No scleral icterus or discharge, No hyposphagma  ENMT: Normal on external inspection of nose, lips, No nasal drainage   Neck: No abnormal masses on inspection  Resp: Normal effort, Bilateral clear to auscultation, No wheezing, No rales  CVS: Regular rate and rhythm, S1 S2 normal, No murmur. No gallop  Extremities: No edema bilateral extremities  Neuro: Alert and oriented  Skin: " No rash, No Ulcers  Psych: Normal mood and affect      Assessment and Plan:    1. Type 2 diabetes mellitus with complication, without long-term current use of insulin (HCC)    START  1.  Xigduo 5/1000 one in the AM one in the PM  2.  Ozempic 1.0 once a week      Return in about 3 months (around 6/5/2019).    Blood glucose log: Check BG in the morning when wake up, before lunch or dinner and before bed.  So three times a day.  Always bring BG diary to the next office visit.       Thank you kindly for allowing me to participate in the diabetes care plan for this patient.    Sanchez Kumar PA-C, BC-ADM  Board Certified - Advanced Diabetes Management  03/05/19    CC:   JOEL Zelaya.

## 2019-06-11 ENCOUNTER — OFFICE VISIT (OUTPATIENT)
Dept: ENDOCRINOLOGY | Facility: MEDICAL CENTER | Age: 39
End: 2019-06-11
Payer: COMMERCIAL

## 2019-06-11 VITALS
HEIGHT: 73 IN | DIASTOLIC BLOOD PRESSURE: 60 MMHG | SYSTOLIC BLOOD PRESSURE: 100 MMHG | OXYGEN SATURATION: 99 % | BODY MASS INDEX: 21.34 KG/M2 | WEIGHT: 161 LBS | HEART RATE: 80 BPM

## 2019-06-11 DIAGNOSIS — E11.8 TYPE 2 DIABETES MELLITUS WITH COMPLICATION, WITHOUT LONG-TERM CURRENT USE OF INSULIN (HCC): ICD-10-CM

## 2019-06-11 LAB
HBA1C MFR BLD: 6.5 % (ref 0–5.6)
INT CON NEG: NEGATIVE
INT CON POS: POSITIVE

## 2019-06-11 PROCEDURE — 99214 OFFICE O/P EST MOD 30 MIN: CPT | Performed by: PHYSICIAN ASSISTANT

## 2019-06-11 PROCEDURE — 83036 HEMOGLOBIN GLYCOSYLATED A1C: CPT | Performed by: PHYSICIAN ASSISTANT

## 2019-06-11 RX ORDER — DAPAGLIFLOZIN AND METFORMIN HYDROCHLORIDE 5; 1000 MG/1; MG/1
1 TABLET, FILM COATED, EXTENDED RELEASE ORAL 2 TIMES DAILY
Qty: 180 TAB | Refills: 4 | Status: SHIPPED | OUTPATIENT
Start: 2019-06-11 | End: 2020-05-11

## 2019-06-11 NOTE — PROGRESS NOTES
Return to office Patient Consult Note  Referred by: IZZY Zelaya    Reason for consult: Diabetes Management Type 2    HPI:  Jose De Jesus Banda is a 38 y.o. old patient who is seeing us today for diabetes care.  This is a pleasant patient with diabetes and I appreciate the opportunity to participate in the care of this patient.    Labs of 6/11/2019 HbA1c is 6.5  Labs of 3/5/19 HbA1c is 7.4  Labs of 12/4/18 HbA1c 7.3  Labs of 7/23/18 HbA1c 10.4  Labs of 5/9/18 HbA1c is 11.4, HDL 45, LDL 93, GFR 86, glucose 306  Labs 2/7/18 HbA1c was 13.0  Labs of 10/11/13 glucose 317  Labs of 10/19/12 glucose 247    BG Diary:6/11/2019  In the AM: 123, 137, 139, 168, 145  Before Bed: 112, 121, 118    Weight is on 182 7/23/18 and today 171      1. Type 2 diabetes mellitus with complication, without long-term current use of insulin ([facility])    This is a new patient with me on 7/23/18   He is on   1. Metformin 1000 BID     STOP:  1.  Metformin     START  1.  Xigduo 5/1000 one in the AM one in the PM  2.  Ozempic 1.0 once a week        ROS:   Constitutional: No night sweats.  Eyes:  No visual changes.  Cardiac: No chest pain, No palpitations or racing heart rate.  Resp: No shortness of breath, No cough,   Gi: No Diarrhea    All other systems were reviewed and were/are negative.      Past Medical History:  Patient Active Problem List    Diagnosis Date Noted   • Type 2 diabetes mellitus with complication, without long-term current use of insulin ([facility]) 02/07/2018   • Hypertriglyceridemia 02/07/2018       Past Surgical History:  History reviewed. No pertinent surgical history.    Allergies:  Patient has no known allergies.    Social History:  Social History     Social History   • Marital status:      Spouse name: N/A   • Number of children: N/A   • Years of education: N/A     Occupational History   • Not on file.     Social History Main Topics   • Smoking status: Never Smoker   • Smokeless tobacco: Never Used   • Alcohol use  "Yes   • Drug use: No   • Sexual activity: Yes     Partners: Female     Other Topics Concern   • Not on file     Social History Narrative   • No narrative on file       Family History:  History reviewed. No pertinent family history.    Medications:    Current Outpatient Prescriptions:   •  Semaglutide (OZEMPIC) 1 MG/DOSE Solution Pen-injector, Inject 1 mg as instructed every 7 days., Disp: 2 PEN, Rfl: 11  •  Dapagliflozin-Metformin HCl ER 5-1000 MG TABLET SR 24 HR, Take 1 tablet by mouth 2 Times a Day., Disp: 180 Tab, Rfl: 4  •  SUPREP BOWEL PREP KIT 17.5-3.13-1.6 GM/180ML Solution, USE (S) BY MOUTH ONCE, Disp: , Rfl: 0  •  vancomycin (VANCOCIN) 125 MG capsule, TAKE 1 CAPSULE BY MOUTH 4 TIMES A DAY, Disp: , Rfl: 0  •  MILK THISTLE PLUS PO, Take  by mouth., Disp: , Rfl:   •  S-Adenosylmethionine (THEODORA-E PO), Take  by mouth., Disp: , Rfl:   •  glucose blood (JACQUELINE CONTOUR NEXT TEST) strip, 1 Strip by Other route as needed., Disp: 100 Strip, Rfl: 3  •  loratadine (CLARITIN) 10 MG Tab, Take 10 mg by mouth every day., Disp: , Rfl:   •  fluticasone (FLONASE) 50 MCG/ACT nasal spray, Spray 1 Spray in nose every day., Disp: , Rfl:         Physical Examination:   Vital signs: /60 (BP Location: Left arm, Patient Position: Sitting)   Pulse 80   Ht 1.854 m (6' 1\")   Wt 73 kg (161 lb)   SpO2 99%   BMI 21.24 kg/m²   General: No distress, cooperative, well dressed and well nourished.   Eyes: No scleral icterus or discharge, No hyposphagma  ENMT: Normal on external inspection of nose, lips, No nasal drainage   Neck: No abnormal masses on inspection  Resp: Normal effort, Bilateral clear to auscultation, No wheezing, No rales  CVS: Regular rate and rhythm, S1 S2 normal, No murmur. No gallop  Extremities: No edema bilateral extremities  Neuro: Alert and oriented  Skin: No rash, No Ulcers  Psych: Normal mood and affect      Assessment and Plan:    1. Type 2 diabetes mellitus with complication, without long-term current use of " insulin (HCC)     START  1.  Xigduo 5/1000 one in the AM one in the PM  2.  Ozempic 1.0 once a week      Return in about 1 year (around 6/11/2020).      Thank you kindly for allowing me to participate in the diabetes care plan for this patient.    Sanchez Kumar PA-C, BC-ADM  Board Certified - Advanced Diabetes Management  06/11/19    CC:   IZZY Zelaya

## 2019-07-25 ENCOUNTER — TELEPHONE (OUTPATIENT)
Dept: ENDOCRINOLOGY | Facility: MEDICAL CENTER | Age: 39
End: 2019-07-25

## 2019-07-25 NOTE — TELEPHONE ENCOUNTER
Patient came in saying he is needing surgery on his should and needs clearance/instructions from us regarding his diabetes. He was just in 6/11 and next apt isnt until next year. Would you like to see him first or can we just write a letter?

## 2019-07-25 NOTE — LETTER
July 25, 2019      Regarding:    Jose De Jesus Banda  [address] [address]      [place] [facility],          If you have any questions or concerns, please don't hesitate to call.        Sincerely,        Sanchez Kumar P.A.-C.    Electronically Signed

## 2019-07-29 ENCOUNTER — TELEPHONE (OUTPATIENT)
Dept: ENDOCRINOLOGY | Facility: MEDICAL CENTER | Age: 39
End: 2019-07-29

## 2019-07-29 NOTE — TELEPHONE ENCOUNTER
1. Caller Name: Evelyn haley MARLEN                                             Patient approves a detailed voicemail message: yes    Need medical claerance letter faxed to [phone]     Faxed to above number

## 2019-08-16 DIAGNOSIS — Z01.812 PRE-OPERATIVE LABORATORY EXAMINATION: ICD-10-CM

## 2019-08-16 PROCEDURE — 80048 BASIC METABOLIC PNL TOTAL CA: CPT

## 2019-08-16 NOTE — OR NURSING
Pre admit apt: Pt. Instructed to continue regularly prescribed medications through day before surgery.  Instructed to take the following medications, the day of surgery, with a sip of water per anesthesia protocol: flonase, claritin

## 2019-08-17 LAB
ANION GAP SERPL CALC-SCNC: 8 MMOL/L (ref 0–11.9)
BUN SERPL-MCNC: 15 MG/DL (ref 8–22)
CALCIUM SERPL-MCNC: 9.7 MG/DL (ref 8.5–10.5)
CHLORIDE SERPL-SCNC: 105 MMOL/L (ref 96–112)
CO2 SERPL-SCNC: 28 MMOL/L (ref 20–33)
CREAT SERPL-MCNC: 0.96 MG/DL (ref 0.5–1.4)
GLUCOSE SERPL-MCNC: 123 MG/DL (ref 65–99)
POTASSIUM SERPL-SCNC: 4.5 MMOL/L (ref 3.6–5.5)
SODIUM SERPL-SCNC: 141 MMOL/L (ref 135–145)

## 2019-08-22 ENCOUNTER — ANESTHESIA (OUTPATIENT)
Dept: SURGERY | Facility: MEDICAL CENTER | Age: 39
End: 2019-08-22
Payer: COMMERCIAL

## 2019-08-22 ENCOUNTER — ANESTHESIA EVENT (OUTPATIENT)
Dept: SURGERY | Facility: MEDICAL CENTER | Age: 39
End: 2019-08-22
Payer: COMMERCIAL

## 2019-08-22 ENCOUNTER — HOSPITAL ENCOUNTER (OUTPATIENT)
Facility: MEDICAL CENTER | Age: 39
End: 2019-08-22
Attending: ORTHOPAEDIC SURGERY | Admitting: ORTHOPAEDIC SURGERY
Payer: COMMERCIAL

## 2019-08-22 VITALS
RESPIRATION RATE: 16 BRPM | HEIGHT: 73 IN | DIASTOLIC BLOOD PRESSURE: 78 MMHG | SYSTOLIC BLOOD PRESSURE: 120 MMHG | OXYGEN SATURATION: 97 % | HEART RATE: 97 BPM | WEIGHT: 150.79 LBS | BODY MASS INDEX: 19.99 KG/M2 | TEMPERATURE: 97.5 F

## 2019-08-22 DIAGNOSIS — M75.01 ADHESIVE CAPSULITIS OF RIGHT SHOULDER: ICD-10-CM

## 2019-08-22 LAB
GLUCOSE BLD-MCNC: 144 MG/DL (ref 65–99)
GLUCOSE BLD-MCNC: 160 MG/DL (ref 65–99)

## 2019-08-22 PROCEDURE — 700105 HCHG RX REV CODE 258: Performed by: ORTHOPAEDIC SURGERY

## 2019-08-22 PROCEDURE — 160048 HCHG OR STATISTICAL LEVEL 1-5: Performed by: ORTHOPAEDIC SURGERY

## 2019-08-22 PROCEDURE — 700101 HCHG RX REV CODE 250: Performed by: ANESTHESIOLOGY

## 2019-08-22 PROCEDURE — 160025 RECOVERY II MINUTES (STATS): Performed by: ORTHOPAEDIC SURGERY

## 2019-08-22 PROCEDURE — 160009 HCHG ANES TIME/MIN: Performed by: ORTHOPAEDIC SURGERY

## 2019-08-22 PROCEDURE — A9270 NON-COVERED ITEM OR SERVICE: HCPCS

## 2019-08-22 PROCEDURE — A6223 GAUZE >16<=48 NO W/SAL W/O B: HCPCS | Performed by: ORTHOPAEDIC SURGERY

## 2019-08-22 PROCEDURE — 700101 HCHG RX REV CODE 250: Performed by: ORTHOPAEDIC SURGERY

## 2019-08-22 PROCEDURE — 700102 HCHG RX REV CODE 250 W/ 637 OVERRIDE(OP): Performed by: ANESTHESIOLOGY

## 2019-08-22 PROCEDURE — 700102 HCHG RX REV CODE 250 W/ 637 OVERRIDE(OP)

## 2019-08-22 PROCEDURE — 82962 GLUCOSE BLOOD TEST: CPT

## 2019-08-22 PROCEDURE — 160022 HCHG BLOCK: Performed by: ORTHOPAEDIC SURGERY

## 2019-08-22 PROCEDURE — 160041 HCHG SURGERY MINUTES - EA ADDL 1 MIN LEVEL 4: Performed by: ORTHOPAEDIC SURGERY

## 2019-08-22 PROCEDURE — 500028 HCHG ARTHROWAND TURBOVAC 3.5/90 SUCT.: Performed by: ORTHOPAEDIC SURGERY

## 2019-08-22 PROCEDURE — 500151 HCHG CANNULA, THRDED 8.4: Performed by: ORTHOPAEDIC SURGERY

## 2019-08-22 PROCEDURE — A4450 NON-WATERPROOF TAPE: HCPCS | Performed by: ORTHOPAEDIC SURGERY

## 2019-08-22 PROCEDURE — 501838 HCHG SUTURE GENERAL: Performed by: ORTHOPAEDIC SURGERY

## 2019-08-22 PROCEDURE — 160002 HCHG RECOVERY MINUTES (STAT): Performed by: ORTHOPAEDIC SURGERY

## 2019-08-22 PROCEDURE — A9270 NON-COVERED ITEM OR SERVICE: HCPCS | Performed by: ANESTHESIOLOGY

## 2019-08-22 PROCEDURE — 160029 HCHG SURGERY MINUTES - 1ST 30 MINS LEVEL 4: Performed by: ORTHOPAEDIC SURGERY

## 2019-08-22 PROCEDURE — 160046 HCHG PACU - 1ST 60 MINS PHASE II: Performed by: ORTHOPAEDIC SURGERY

## 2019-08-22 PROCEDURE — 502581 HCHG PACK, SHOULDER ARTHROSCOPY: Performed by: ORTHOPAEDIC SURGERY

## 2019-08-22 PROCEDURE — 700111 HCHG RX REV CODE 636 W/ 250 OVERRIDE (IP): Performed by: ANESTHESIOLOGY

## 2019-08-22 PROCEDURE — 160036 HCHG PACU - EA ADDL 30 MINS PHASE I: Performed by: ORTHOPAEDIC SURGERY

## 2019-08-22 PROCEDURE — 160035 HCHG PACU - 1ST 60 MINS PHASE I: Performed by: ORTHOPAEDIC SURGERY

## 2019-08-22 RX ORDER — OXYCODONE HCL 5 MG/5 ML
5 SOLUTION, ORAL ORAL
Status: DISCONTINUED | OUTPATIENT
Start: 2019-08-22 | End: 2019-08-22 | Stop reason: HOSPADM

## 2019-08-22 RX ORDER — OXYCODONE HYDROCHLORIDE 5 MG/1
5-10 TABLET ORAL EVERY 6 HOURS PRN
Qty: 40 TAB | Refills: 0 | Status: SHIPPED | OUTPATIENT
Start: 2019-08-22 | End: 2019-08-29

## 2019-08-22 RX ORDER — BUPIVACAINE HYDROCHLORIDE 5 MG/ML
INJECTION, SOLUTION EPIDURAL; INTRACAUDAL
Status: COMPLETED
Start: 2019-08-22 | End: 2019-08-22

## 2019-08-22 RX ORDER — LIDOCAINE HYDROCHLORIDE 10 MG/ML
INJECTION, SOLUTION INFILTRATION; PERINEURAL
Status: DISCONTINUED | OUTPATIENT
Start: 2019-08-22 | End: 2019-08-22 | Stop reason: HOSPADM

## 2019-08-22 RX ORDER — BUPIVACAINE HYDROCHLORIDE 5 MG/ML
INJECTION, SOLUTION EPIDURAL; INTRACAUDAL PRN
Status: DISCONTINUED | OUTPATIENT
Start: 2019-08-22 | End: 2019-08-22 | Stop reason: SURG

## 2019-08-22 RX ORDER — GABAPENTIN 300 MG/1
CAPSULE ORAL
Status: COMPLETED
Start: 2019-08-22 | End: 2019-08-22

## 2019-08-22 RX ORDER — CEFAZOLIN SODIUM 1 G/3ML
INJECTION, POWDER, FOR SOLUTION INTRAMUSCULAR; INTRAVENOUS PRN
Status: DISCONTINUED | OUTPATIENT
Start: 2019-08-22 | End: 2019-08-22 | Stop reason: SURG

## 2019-08-22 RX ORDER — MIDAZOLAM HYDROCHLORIDE 1 MG/ML
INJECTION INTRAMUSCULAR; INTRAVENOUS
Status: COMPLETED
Start: 2019-08-22 | End: 2019-08-22

## 2019-08-22 RX ORDER — DEXAMETHASONE SODIUM PHOSPHATE 4 MG/ML
INJECTION, SOLUTION INTRA-ARTICULAR; INTRALESIONAL; INTRAMUSCULAR; INTRAVENOUS; SOFT TISSUE PRN
Status: DISCONTINUED | OUTPATIENT
Start: 2019-08-22 | End: 2019-08-22 | Stop reason: SURG

## 2019-08-22 RX ORDER — HALOPERIDOL 5 MG/ML
1 INJECTION INTRAMUSCULAR
Status: DISCONTINUED | OUTPATIENT
Start: 2019-08-22 | End: 2019-08-22 | Stop reason: HOSPADM

## 2019-08-22 RX ORDER — LIDOCAINE HYDROCHLORIDE 40 MG/ML
SOLUTION TOPICAL PRN
Status: DISCONTINUED | OUTPATIENT
Start: 2019-08-22 | End: 2019-08-22 | Stop reason: SURG

## 2019-08-22 RX ORDER — PHENYLEPHRINE HYDROCHLORIDE 10 MG/ML
INJECTION, SOLUTION INTRAMUSCULAR; INTRAVENOUS; SUBCUTANEOUS PRN
Status: DISCONTINUED | OUTPATIENT
Start: 2019-08-22 | End: 2019-08-22 | Stop reason: SURG

## 2019-08-22 RX ORDER — SODIUM CHLORIDE, SODIUM LACTATE, POTASSIUM CHLORIDE, CALCIUM CHLORIDE 600; 310; 30; 20 MG/100ML; MG/100ML; MG/100ML; MG/100ML
INJECTION, SOLUTION INTRAVENOUS CONTINUOUS
Status: DISCONTINUED | OUTPATIENT
Start: 2019-08-22 | End: 2019-08-22 | Stop reason: HOSPADM

## 2019-08-22 RX ORDER — MEPERIDINE HYDROCHLORIDE 25 MG/ML
12.5 INJECTION INTRAMUSCULAR; INTRAVENOUS; SUBCUTANEOUS
Status: DISCONTINUED | OUTPATIENT
Start: 2019-08-22 | End: 2019-08-22 | Stop reason: HOSPADM

## 2019-08-22 RX ORDER — ACETAMINOPHEN 500 MG
1000 TABLET ORAL ONCE
Status: COMPLETED | OUTPATIENT
Start: 2019-08-22 | End: 2019-08-22

## 2019-08-22 RX ORDER — GABAPENTIN 300 MG/1
300 CAPSULE ORAL ONCE
Status: COMPLETED | OUTPATIENT
Start: 2019-08-22 | End: 2019-08-22

## 2019-08-22 RX ORDER — ONDANSETRON 4 MG/1
4 TABLET, FILM COATED ORAL EVERY 4 HOURS PRN
Qty: 20 TAB | Refills: 0 | Status: SHIPPED | OUTPATIENT
Start: 2019-08-22 | End: 2019-08-29

## 2019-08-22 RX ORDER — CELECOXIB 200 MG/1
CAPSULE ORAL
Status: COMPLETED
Start: 2019-08-22 | End: 2019-08-22

## 2019-08-22 RX ORDER — ONDANSETRON 2 MG/ML
4 INJECTION INTRAMUSCULAR; INTRAVENOUS
Status: DISCONTINUED | OUTPATIENT
Start: 2019-08-22 | End: 2019-08-22 | Stop reason: HOSPADM

## 2019-08-22 RX ORDER — CELECOXIB 200 MG/1
200 CAPSULE ORAL ONCE
Status: COMPLETED | OUTPATIENT
Start: 2019-08-22 | End: 2019-08-22

## 2019-08-22 RX ORDER — BUPIVACAINE HYDROCHLORIDE AND EPINEPHRINE 2.5; 5 MG/ML; UG/ML
INJECTION, SOLUTION EPIDURAL; INFILTRATION; INTRACAUDAL; PERINEURAL
Status: DISCONTINUED | OUTPATIENT
Start: 2019-08-22 | End: 2019-08-22 | Stop reason: HOSPADM

## 2019-08-22 RX ORDER — NAPROXEN 500 MG/1
500 TABLET ORAL 2 TIMES DAILY WITH MEALS
Qty: 10 TAB | Refills: 0 | Status: SHIPPED | OUTPATIENT
Start: 2019-08-22 | End: 2019-08-27

## 2019-08-22 RX ORDER — LIDOCAINE HYDROCHLORIDE 10 MG/ML
INJECTION, SOLUTION INFILTRATION; PERINEURAL
Status: DISCONTINUED
Start: 2019-08-22 | End: 2019-08-22 | Stop reason: HOSPADM

## 2019-08-22 RX ADMIN — MIDAZOLAM HYDROCHLORIDE 1 MG: 1 INJECTION, SOLUTION INTRAMUSCULAR; INTRAVENOUS at 08:50

## 2019-08-22 RX ADMIN — PHENYLEPHRINE HYDROCHLORIDE 100 MCG: 10 INJECTION INTRAVENOUS at 09:53

## 2019-08-22 RX ADMIN — FENTANYL CITRATE 150 MCG: 50 INJECTION, SOLUTION INTRAMUSCULAR; INTRAVENOUS at 09:06

## 2019-08-22 RX ADMIN — CELECOXIB 200 MG: 200 CAPSULE ORAL at 08:40

## 2019-08-22 RX ADMIN — LIDOCAINE HYDROCHLORIDE 80 MG: 20 INJECTION, SOLUTION INFILTRATION; PERINEURAL at 09:06

## 2019-08-22 RX ADMIN — SUGAMMADEX 200 MG: 100 INJECTION, SOLUTION INTRAVENOUS at 10:03

## 2019-08-22 RX ADMIN — CEFAZOLIN 2 G: 1 INJECTION, POWDER, FOR SOLUTION INTRAVENOUS at 09:06

## 2019-08-22 RX ADMIN — SODIUM CHLORIDE, POTASSIUM CHLORIDE, SODIUM LACTATE AND CALCIUM CHLORIDE: 600; 310; 30; 20 INJECTION, SOLUTION INTRAVENOUS at 06:19

## 2019-08-22 RX ADMIN — ACETAMINOPHEN 1000 MG: 500 TABLET, FILM COATED ORAL at 08:39

## 2019-08-22 RX ADMIN — PHENYLEPHRINE HYDROCHLORIDE 100 MCG: 10 INJECTION INTRAVENOUS at 09:58

## 2019-08-22 RX ADMIN — MIDAZOLAM HYDROCHLORIDE 1 MG: 1 INJECTION, SOLUTION INTRAMUSCULAR; INTRAVENOUS at 09:00

## 2019-08-22 RX ADMIN — ROCURONIUM BROMIDE 20 MG: 10 INJECTION INTRAVENOUS at 09:06

## 2019-08-22 RX ADMIN — PHENYLEPHRINE HYDROCHLORIDE 200 MCG: 10 INJECTION INTRAVENOUS at 09:39

## 2019-08-22 RX ADMIN — PHENYLEPHRINE HYDROCHLORIDE 100 MCG: 10 INJECTION INTRAVENOUS at 09:25

## 2019-08-22 RX ADMIN — SODIUM CHLORIDE, POTASSIUM CHLORIDE, SODIUM LACTATE AND CALCIUM CHLORIDE: 600; 310; 30; 20 INJECTION, SOLUTION INTRAVENOUS at 09:09

## 2019-08-22 RX ADMIN — LIDOCAINE HYDROCHLORIDE 4 ML: 40 SOLUTION TOPICAL at 09:08

## 2019-08-22 RX ADMIN — PHENYLEPHRINE HYDROCHLORIDE 100 MCG: 10 INJECTION INTRAVENOUS at 10:04

## 2019-08-22 RX ADMIN — PHENYLEPHRINE HYDROCHLORIDE 100 MCG: 10 INJECTION INTRAVENOUS at 09:31

## 2019-08-22 RX ADMIN — DEXAMETHASONE SODIUM PHOSPHATE 8 MG: 4 INJECTION, SOLUTION INTRAMUSCULAR; INTRAVENOUS at 09:13

## 2019-08-22 RX ADMIN — PROPOFOL 140 MG: 10 INJECTION, EMULSION INTRAVENOUS at 09:06

## 2019-08-22 RX ADMIN — PHENYLEPHRINE HYDROCHLORIDE 100 MCG: 10 INJECTION INTRAVENOUS at 09:48

## 2019-08-22 RX ADMIN — GABAPENTIN 300 MG: 300 CAPSULE ORAL at 08:40

## 2019-08-22 RX ADMIN — BUPIVACAINE HYDROCHLORIDE 20 ML: 5 INJECTION, SOLUTION EPIDURAL; INTRACAUDAL; PERINEURAL at 08:55

## 2019-08-22 RX ADMIN — LIDOCAINE HYDROCHLORIDE 0.5 ML: 10 INJECTION, SOLUTION INFILTRATION; PERINEURAL at 06:18

## 2019-08-22 ASSESSMENT — PAIN SCALES - GENERAL: PAIN_LEVEL: 0

## 2019-08-22 NOTE — ANESTHESIA TIME REPORT
Anesthesia Start and Stop Event Times     Date Time Event    8/22/2019 0856 Ready for Procedure     0859 Anesthesia Start     1019 Anesthesia Stop        Responsible Staff  08/22/19    Name Role Begin End    Luciana Washington M.D. Anesth 0859 1019        Preop Diagnosis (Free Text):  Pre-op Diagnosis     FROZEN RIGHT SHOULDER, CALCIFIC TENDINITIS OF RIGHT SHOULDER, PIAN IN RIGHT SHOULDER        Preop Diagnosis (Codes):    Post op Diagnosis  Frozen shoulder  right shoulder calcific tendinitis and pain    Premium Reason  Non-Premium    Comments:

## 2019-08-22 NOTE — ANESTHESIA PREPROCEDURE EVALUATION
37 yo w/frozen right shoulder    Relevant Problems   ANESTHESIA (within normal limits)      ENDO   (+) Type 2 diabetes mellitus with complication, without long-term current use of insulin (HCC)       Physical Exam    Airway   Mallampati: II  TM distance: >3 FB  Neck ROM: full       Cardiovascular   Rhythm: regular  Rate: normal  (-) murmur     Dental   Comments: Multiple missing teeth including lower front tooth       Pulmonary   Breath sounds clear to auscultation     Abdominal    Neurological - normal exam                 Anesthesia Plan    ASA 2       Plan - general and peripheral nerve block     Peripheral nerve block will be post-op pain control  Airway plan will be ETT        Induction: intravenous    Postoperative Plan: Postoperative administration of opioids is intended.    Pertinent diagnostic labs and testing reviewed    Informed Consent:    Anesthetic plan and risks discussed with patient.

## 2019-08-22 NOTE — DISCHARGE INSTRUCTIONS
ACTIVITY: Rest and take it easy for the first 24 hours.  A responsible adult is recommended to remain with you during that time.  It is normal to feel sleepy.  We encourage you to not do anything that requires balance, judgment or coordination.    MILD FLU-LIKE SYMPTOMS ARE NORMAL. YOU MAY EXPERIENCE GENERALIZED MUSCLE ACHES, THROAT IRRITATION, HEADACHE AND/OR SOME NAUSEA.    FOR 24 HOURS DO NOT:  Drive, operate machinery or run household appliances.  Drink beer or alcoholic beverages.   Make important decisions or sign legal documents.    SPECIAL INSTRUCTIONS:  PLEASE READ AND FOLLOW DR. Greer'S DISCHARGE INSTRUCTION INFORMATION.  NO WEIGHT BEARING TO RIGHT UPPER EXTREMITY.     DIET: To avoid nausea, slowly advance diet as tolerated, avoiding spicy or greasy foods for the first day.  Add more substantial food to your diet according to your physician's instructions.  Babies can be fed formula or breast milk as soon as they are hungry.  INCREASE FLUIDS AND FIBER TO AVOID CONSTIPATION.        FOLLOW-UP APPOINTMENT:  FOLLOW UP AS SCHEDULED.      You should CALL YOUR PHYSICIAN if you develop:  Fever greater than 101 degrees F.  Pain not relieved by medication, or persistent nausea or vomiting.  Excessive bleeding (blood soaking through dressing) or unexpected drainage from the wound.  Extreme redness or swelling around the incision site, drainage of pus or foul smelling drainage.  Inability to urinate or empty your bladder within 8 hours.  Problems with breathing or chest pain.    You should call 911 if you develop problems with breathing or chest pain.  If you are unable to contact your doctor or surgical center, you should go to the nearest emergency room or urgent care center.  Physician's telephone #:  [phone].    If any questions arise, call your doctor.  If your doctor is not available, please feel free to call the Surgical Center at [phone].  The Center is open Monday through Friday from 7AM to 7PM.   You can also call the HEALTH HOTLINE open 24 hours/day, 7 days/week and speak to a nurse at [phone], or toll free at [phone].    A registered nurse may call you a few days after your surgery to see how you are doing after your procedure.    MEDICATIONS: Resume taking daily medication.  Take prescribed pain medication with food.  If no medication is prescribed, you may take non-aspirin pain medication if needed.  PAIN MEDICATION CAN BE VERY CONSTIPATING.  Take a stool softener or laxative such as senokot, pericolace, or milk of magnesia if needed.    Prescription given for:  OXYCODONE,  NAPROSYN,  ZOFRAN.     Last pain medication given at:  NONE IN PACU.     If your physician has prescribed pain medication that includes Acetaminophen (Tylenol), do not take additional Acetaminophen (Tylenol) while taking the prescribed medication.    Depression / Suicide Risk    As you are discharged from this [facility] facility, it is important to learn how to keep safe from harming yourself.    Recognize the warning signs:  · Abrupt changes in personality, positive or negative- including increase in energy   · Giving away possessions  · Change in eating patterns- significant weight changes-  positive or negative  · Change in sleeping patterns- unable to sleep or sleeping all the time   · Unwillingness or inability to communicate  · Depression  · Unusual sadness, discouragement and loneliness  · Talk of wanting to die  · Neglect of personal appearance   · Rebelliousness- reckless behavior  · Withdrawal from people/activities they love  · Confusion- inability to concentrate     If you or a loved one observes any of these behaviors or has concerns about self-harm, here's what you can do:  · Talk about it- your feelings and reasons for harming yourself  · Remove any means that you might use to hurt yourself (examples: pills, rope, extension cords, firearm)  · Get professional help from the community (Mental Health,  "Substance Abuse, psychological counseling)  · Do not be alone:Call your Safe Contact- someone whom you trust who will be there for you.  · Call your local CRISIS HOTLINE 878-6399 or 790-223-1414  · Call your local Children's Mobile Crisis Response Team [place] (734) 238-9393 or www.[employer]  · Call the toll free National Suicide Prevention Hotlines   · National Suicide Prevention Lifeline 300-881-AICW (9972)  [employer] 800-SUICIDE (803-7423)      Peripheral Nerve Block Discharge Instructions from Same Day Surgery and Inpatient :    What to Expect - Upper Extremity  · You may experience numbness and weakness in shoulder, arm and hand  on the same side as your surgery  · This is normal. For some people, this may be an unpleasant sensation. Be very careful with your numb limb  · Ask for help when you need it  Shoulder Surgery Side Effects  · In addition to numbness and weakness you may experience other symptoms  · Other nerves that are close to those nerves injected can also be affected by local anesthesia  · You may experience a hoarseness in your voice  · Your breathing may feel different  · You may also notice drooping of your eyelid, pupil constriction, and decreased sweating, on the side of your surgery  · All of these side effects are normal and will resolve when the local anesthetic wears off   Prevent Injury  · Protect the limb like a baby  · Beware of exposing your limb to extreme heat or cold or trauma  · The limb may be injured without you noticing because it is numb  · Keep the limb elevated whenever possible  · Do not sleep on the limb  · Change the position of the limb regularly  · Avoid putting pressure on your surgical limb  Pain Control  · The initial block on the day of surgery will make your extremity feel \"numb\"  · Any consecutive injection including prior to discharge from the hospital will make your extremity feel \"numb\"  · You may feel an aching or burning when the " local anesthesia starts to wear off  · Take pain pills as prescribed by your surgeon  · Call your surgeon or anesthesiologist if you do not have adequate pain control

## 2019-08-22 NOTE — ANESTHESIA POSTPROCEDURE EVALUATION
Patient: Jose De Jesus Banda    Procedure Summary     Date:  08/22/19 Room / Location:   OR 02 / SURGERY [facility]    Anesthesia Start:  0859 Anesthesia Stop:  1019    Procedures:       ARTHROSCOPY, SHOULDER- CAPSULE RELEASE (Right Shoulder)      MANIPULATION, SHOULDER (Right Shoulder)      DECOMPRESSION, SHOULDER, ARTHROSCOPIC- SUBACROMIAL (Right Shoulder) Diagnosis:  (FROZEN RIGHT SHOULDER, CALCIFIC TENDINITIS OF RIGHT SHOULDER, PIAN IN RIGHT SHOULDER)    Surgeon:  Karon Fisher M.D. Responsible Provider:  Luciana Washington M.D.    Anesthesia Type:  general, peripheral nerve block ASA Status:  2          Final Anesthesia Type: general, peripheral nerve block  Last vitals  BP   Blood Pressure: 108/67    Temp   36.5 °C (97.7 °F)    Pulse   Heart Rate (Monitored): 90   Resp   14    SpO2   100 %      Anesthesia Post Evaluation    Patient location during evaluation: PACU  Patient participation: complete - patient participated  Level of consciousness: awake  Pain score: 0    Airway patency: patent  Anesthetic complications: no  Cardiovascular status: adequate  Respiratory status: acceptable  Hydration status: acceptable    PONV: none           Nurse Pain Score: 0 (NPRS)

## 2019-08-22 NOTE — OR NURSING
1017: To PACU post right shoulder arthroscopy w/ block. Pt is extubated, breathing spontaneous and unlabored. Strong radial pulse observed.   1022: Denies pain. Able to slightly sense touch to hand/fingers and able to move them slightly. Report given SAADIA Reed RN.

## 2019-08-22 NOTE — OR SURGEON
Immediate Post OP Note    PreOp Diagnosis: Right shoulder adhesive capsulitis, subacromial impingement and bursitis    PostOp Diagnosis: same    Procedure(s):  ARTHROSCOPY, SHOULDER- CAPSULE RELEASE - Wound Class: Clean  MANIPULATION, SHOULDER - Wound Class: Clean  DECOMPRESSION, SHOULDER, ARTHROSCOPIC- SUBACROMIAL - Wound Class: Clean    Surgeon(s):  Karon Fisher M.D.    Anesthesiologist/Type of Anesthesia:  Anesthesiologist: Luciana Washington M.D.  Anesthesia Technician: Glen Guevara/General    Surgical Staff:  Circulator: Latrice Connolyl R.N.  Scrub Person: Slade Thomas    Specimens removed if any:  None    Assistant: first crista Dutta    Estimated Blood Loss: 5 mL    Findings: severe adhesive capsulitis with pre op range of motion forward flexion 80 (after manip - 180), abduction 60 (after manip - 180), ER 10 (after manip - 45), abduction/ER 45/10 (after manip - 90  /60), impingement and bursitis    Complications: none        8/22/2019 10:06 AM Karon Fisher M.D.

## 2019-08-22 NOTE — ANESTHESIA QCDR
2019 [facility] Clinical Data Registry (for Quality Improvement)     Postoperative nausea/vomiting risk protocol (Adult = 18 yrs and Pediatric 3-17 yrs)- (430 and 463)  General inhalation anesthetic (NOT TIVA) with PONV risk factors: No  Provision of anti-emetic therapy with at least 2 different classes of agents: N/A  Patient DID NOT receive anti-emetic therapy and reason is documented in Medical Record: N/A    Multimodal Pain Management- (AQI59)  Patient undergoing Elective Surgery (i.e. Outpatient, or ASC, or Prescheduled Surgery prior to Hospital Admission): Yes  Use of Multimodal Pain Management, two or more drugs and/or interventions, NOT including systemic opioids: Yes   Exception: Documented allergy to multiple classes of analgesics:  N/A    PACU assessment of acute postoperative pain prior to Anesthesia Care End- Applies to Patients Age = 18- (ABG7)  Initial PACU pain score is which of the following: < 7/10  Patient unable to report pain score: N/A    Post-anesthetic transfer of care checklist/protocol to PACU/ICU- (426 and 427)  Upon conclusion of case, patient transferred to which of the following locations: PACU/Non-ICU  Use of transfer checklist/protocol: Yes  Exclusion: Service Performed in Patient Hospital Room (and thus did not require transfer): N/A    PACU Reintubation- (AQI31)  General anesthesia requiring endotracheal intubation (ETT) along with subsequent extubation in OR or PACU: Yes  Required reintubation in the PACU: No   Extubation was a planned trial documented in the medical record prior to removal of the original airway device:  N/A    Unplanned admission to ICU related to anesthesia service up through end of PACU care- (MD51)  Unplanned admission to ICU (not initially anticipated at anesthesia start time): No

## 2019-08-22 NOTE — OR NURSING
1148: To stage ll. Up and dressed w/ CNA assist. No pain or nausea.  1206: Home care instructions reviewed w/ pt and mother. No questions, meets criteria for discharge.

## 2019-08-22 NOTE — ANESTHESIA PROCEDURE NOTES
Airway  Date/Time: 8/22/2019 9:08 AM  Performed by: Luciana Washington M.D.  Authorized by: Luciana Washington M.D.     Location:  OR  Urgency:  Elective  Indications for Airway Management:  Anesthesia  Spontaneous Ventilation: absent    Sedation Level:  Deep  Preoxygenated: Yes    Patient Position:  Sniffing  Mask Difficulty Assessment:  1 - vent by mask  Final Airway Type:  Endotracheal airway  Final Endotracheal Airway:  ETT  Cuffed: Yes    Technique Used for Successful ETT Placement:  Direct laryngoscopy  Devices/Methods Used in Placement:  Intubating stylet  Insertion Site:  Oral  Blade Type:  Mat  Laryngoscope Blade/Videolaryngoscope Blade Size:  3  ETT Size (mm):  7.5  Measured from:  Teeth  ETT to Teeth (cm):  22  Placement Verified by: capnometry and palpation of cuff    Cormack-Lehane Classification:  Grade IIa - partial view of glottis  Number of Attempts at Approach:  1

## 2019-08-22 NOTE — ANESTHESIA PROCEDURE NOTES
Peripheral Block  Date/Time: 8/22/2019 8:51 AM  Performed by: Luciana Washington M.D.  Authorized by: Luciana Washington M.D.     Start Time:  8/22/2019 8:51 AM  End Time:  8/22/2019 8:55 AM  Reason for Block: at surgeon's request and post-op pain management    patient identified, IV checked, site marked, risks and benefits discussed, surgical consent, monitors and equipment checked, pre-op evaluation and timeout performed    Patient Position:  Supine  Prep: ChloraPrep    Monitoring:  Heart rate, continuous pulse ox and cardiac monitor  Block Region:  Upper Extremity  Upper Extremity - Block Type:  BRACHIAL PLEXUS block, Interscalene approach    Laterality:  Right  Procedures: ultrasound guided  Image captured, interpreted and electronically stored.  Local Infiltration:  Lidocaine  Strength:  1 %  Dose:  1 ml  Block Type:  Single-shot  Needle Length:  50mm  Needle Gauge:  22 G  Needle Localization:  Ultrasound guidance  Injection Assessment:  Negative aspiration for heme, no paresthesia on injection, incremental injection and local visualized surrounding nerve on ultrasound  Evidence of intravascular injection: No     US Guided Interscalene Brachial Plexus Block   US transducer placed on the neck in oblique plane approximately at the level of C6.  Anterior and Middle Scalene (MSM) muscles identified with nerve trunks identified between the muscles.  Needle inserted lateral to probe and advanced under direct visualization through the MSM into a perineural position.  After negative aspiration, LA injected with ease and visualized surrounding the nerve trunks.

## 2019-08-22 NOTE — OR NURSING
Respirations easy.  HOB elevated. Gauze dressing is clean and dry to right shoulder and ice pack is in place.  Patient denies pain and nausea. Fingers are warm with brisk capillary refill, nailbeds are pink.  Patient can wiggle his fingers slightly. FSBS checked upon arrival to PACU per Dr. Luciana Washington.  Result= 144, reported to Dr. Washington.

## 2019-08-23 NOTE — OP REPORT
DATE: 08/22/2019    PREOPERATIVE DIAGNOSES:  1. Right shoulder adhesive capsulitis.  2. Right shoulder subacromial impingement and bursitis     POSTOPERATIVE DIAGNOSES:  1. Right shoulder adhesive capsulitis.  2. Right shoulder subacromial impingement and bursitis     PROCEDURE:  1. Right shoulder diagnostic arthroscopy.  2. Right shoulder manipulation under anesthesia.  3. Right shoulder arthroscopic capsular release.  4. Right shoulder subacromial decompression.    ASSISTANT:  first crista Dutta.    ANESTHESIA:  General with an intrascalene nerve block.    COMPLICATIONS:  None.    IMPLANTS:  None.    FINDINGS:  Jose De Jesus had severe adhesive capsulitis with pre op range of motion forward flexion 80 (after manip - 180), abduction 60 (after manip - 180), ER 10 (after manip - 45), abduction/ER 45/10 (after manip - 90/30).  His rotator cuff was intact and pristine. His biceps tendon was intact and pristine without any evidence of subluxation. The cartilage of his glenohumeral joint was intact and pristine. There was significant subacromial bursitis and impingement.    POSTOPERATIVE PLAN:  The patient will be nonweightbearing on his right upper extremity but will start range of motion active and passive exercises right away with physical therapy to prevent further stiffness. He will be in a simple sling for a few days as we pain requires. He will return to clinic in 7 to 10 days for suture removal.    INDICATIONS FOR THE PROCEDURE:  Rex is a 38-year-old male with type 2 diabetes and a history of C. Diff who presented to clinic originally with right shoulder pain and had been diagnosed with adhesive capsulitis. He has tried 2 previous cortisone injections one into his subacromial space and one into his glenohumeral joint as well as a large volume fluid injection into the glenohumeral space, but nothing helped and he continues to have pain as well as stiffness which is significantly affecting his life and  his sleep. He has also tried and failed over 6 months of physical therapy, activity modification, physician directed exercise program, anti-inflammatories and ice, and feels like he has not improved and therefore  elected to proceed with surgery. I had a long discussion with Jose De Jesus regarding the risks and benefits of surgery including, but not limited to, bleeding,infection, risks to surrounding structures such as blood vessels and nerves, pain, scar, reoperation, and risks of the anesthesia such as stroke, heart attack, deep venous thrombosis, pulmonary embolism, and death. Jose De Jesus understood the risks and benefits and elected to proceed with surgery.    PROCEDURE IN DETAIL:  Jose De Jesus was met in the preoperative hold area where the correct right shoulder was marked with my initials. He was then transported to the operating room where he was placed supine on the operating room table with all his bony prominences well padded. He received an intrascalene nerve block under ultrasound guidance by the anesthesia team in the OR. A preoperative time out was held where all those in  the room agreed upon the correct patient, the correct site of surgery, and the correct surgery to be performed. He was then intubated by the anesthesia team without complications. He received 2 g of preoperative Ancef. He was then placed in the beach chair position again with all of his bony prominences well padded. A preoperative exam under anesthesia revealed forward flexion of 80 degrees (after manip - 180), abduction 60 (after manip - 180), ER 10 (after manip - 45), abduction/ER 45/10 (after manip - 90/30). The patient's right upper extremity was then prepped and draped in the usual sterile fashion.I then began my procedure by injecting a combination of 30 mL of 0.25% Marcaine with 30 mL of lidocaine into the glenohumeral joint via a spinal needle via the posterior approach. I then made my posterior viewing portal with an 11 blade and  inserted the scope into the glenohumeral joint. I then made my anterior portal under direct visualization with the spinal needle and again used an 11 blade and inserted a probe anteriorly. I began with my diagnostic arthroscopy examining pristine glenohumeral cartilage. The labrum was intact, but he did have a ton of erythema within the joint especially in the rotator interval. The rotator cuff including the subscapularis was intact and pristine. The biceps and biceps anchor were intact and pristine, and he did not have a lipstick sign pulling the biceps into the joint. There were no loose bodies in the axillary recess. I then proceeded with my arthroscopic capsular release, releasing the rotator interval and also the capsule off the glenoid using the ArthroCare wand. Once I was happy with my release, I then proceeded with my subacromial decompression.  I inserted the scope into the subacromial space and encountered a significant amount of bursitis within the subacromial space. I made my lateral portal under direct visualization with the spinal needle and proceeded with my bursectomy using the 4.0 mm sucker shaver as well as the ArthroCare wand. I then used the 5.5 mm resector to perform my subacromial decompression. I then copiously irrigated out the joint with arthroscopic fluid and closed the portals with 3-0 Prolene. I again checked his range of motion after the scope was removed and again he had 180 degrees of forward flexion, 180 degrees of abduction, external rotation to 45 degrees at the side, external rotation of 30 degrees with the arm abducted, and internal  rotation of 60 degrees with the arm abducted. Sterile dressings were then applied to the left shoulder, and the patient was awoken from anesthesia without complication.      Please note that all counts were correct at the end of the case and Melody Kinney,  assist, was necessary for all portions of the procedure.      Karon Fisher,  MD

## 2019-09-05 ENCOUNTER — TELEPHONE (OUTPATIENT)
Dept: ENDOCRINOLOGY | Facility: MEDICAL CENTER | Age: 39
End: 2019-09-05

## 2019-09-05 NOTE — TELEPHONE ENCOUNTER
DOCUMENTATION OF PAR STATUS:    1. Name of Medication & Dose: xugduo 5-1000     2. Name of Prescription Coverage Company & phone #: barry [email or web address]    3. Date Prior Auth Submitted: 09/05/2019    4. What information was given to obtain insurance decision? Medication tried    5. Prior Auth Status? Pending    6. Patient Notified: yes

## 2019-09-05 NOTE — TELEPHONE ENCOUNTER
FINAL PRIOR AUTHORIZATION STATUS:    1.  Name of Medication & Dose: OZEMPIC     2. Prior Auth Status: APPRIOVEDPA Case [id number] Status: Approved. Authorization and Notifications Completed      3. Action Taken: Pharmacy Notified: yes Patient Notified: yes

## 2019-09-18 NOTE — TELEPHONE ENCOUNTER
1. Caller Name: Jose De Jesus                                         Call Back Number: [phone] (home)       Patient approves a detailed voicemail message: no    Patient called because he received a denial letter for xigduo from his insurance company. Can we please try a diffrent medication.

## 2019-09-24 ENCOUNTER — TELEPHONE (OUTPATIENT)
Dept: ENDOCRINOLOGY | Facility: MEDICAL CENTER | Age: 39
End: 2019-09-24

## 2019-09-24 NOTE — TELEPHONE ENCOUNTER
FINAL PRIOR AUTHORIZATION STATUS:    1.  Name of Medication & Dose: Synjardy XR 12.5-1000mg     2. Prior Auth Status (if approved--length of approval):  Approved      3. Action Taken: Pharmacy Notified: yes    Documentation was scanned into media and attached to this telephone encounter.

## 2020-04-21 ENCOUNTER — PATIENT MESSAGE (OUTPATIENT)
Dept: ENDOCRINOLOGY | Facility: MEDICAL CENTER | Age: 40
End: 2020-04-21

## 2020-05-11 ENCOUNTER — TELEMEDICINE (OUTPATIENT)
Dept: ENDOCRINOLOGY | Facility: MEDICAL CENTER | Age: 40
End: 2020-05-11
Payer: COMMERCIAL

## 2020-05-11 DIAGNOSIS — Z79.84 LONG TERM (CURRENT) USE OF ORAL HYPOGLYCEMIC DRUGS: ICD-10-CM

## 2020-05-11 DIAGNOSIS — E78.5 DYSLIPIDEMIA: ICD-10-CM

## 2020-05-11 DIAGNOSIS — E11.8 TYPE 2 DIABETES MELLITUS WITH COMPLICATION, WITHOUT LONG-TERM CURRENT USE OF INSULIN (HCC): ICD-10-CM

## 2020-05-11 PROCEDURE — 99214 OFFICE O/P EST MOD 30 MIN: CPT | Mod: 95,CR | Performed by: INTERNAL MEDICINE

## 2020-05-11 RX ORDER — SEMAGLUTIDE 1.34 MG/ML
1 INJECTION, SOLUTION SUBCUTANEOUS
Qty: 6 PEN | Refills: 2 | Status: SHIPPED | OUTPATIENT
Start: 2020-05-11 | End: 2020-07-31 | Stop reason: SDUPTHER

## 2020-05-11 RX ORDER — EMPAGLIFLOZIN AND METFORMIN HYDROCHLORIDE 12.5; 1 MG/1; MG/1
1 TABLET ORAL 2 TIMES DAILY
Qty: 180 TAB | Refills: 2 | Status: SHIPPED | OUTPATIENT
Start: 2020-05-11 | End: 2021-05-28

## 2020-05-11 NOTE — PROGRESS NOTES
CHIEF COMPLAINT: Patient is here for follow up of Type 2 Diabetes Mellitus.  Patient was presented for a telehealth consultation via secure and encrypted videoconferencing technology. This encounter was conducted via Zoom . Verbal consent was obtained. Patient's identity was verified.      HPI:     Jose De Jesus Banda is a 39 y.o. male with Type 2 Diabetes Mellitus here for follow up.    Labs from 6/11/2020 HbA1c is good at 6.5%.  He is overdue for labs.    He was on Ozempic and Xigduo, He is now on Ozempic 1.0mg weekly, Synjardy 12.5mg 1000mg bid    He reports that he is doing well and is tolerating his DM meds  He denies hyperglycemia  He denies hypoglycemia    He has a history of hyperlipidemia but is not on a statin because he still very young    He denies a history of essential hypertension        BG Diary:05/11/20  Breakfast: 120-140    Weight has decreased 30 pounds over last 3 months    Diabetes Complications   Retinopathy: No known retinopathy.  Last eye exam: January 2020 at [facility]  Neuropathy: Denies paresthesias or numbness in hands or feet. Denies any foot wounds.  Exercise: Minimal.  Diet: Fair.  Patient's medications, allergies, and social histories were reviewed and updated as appropriate.    ROS:     CONS:     No fever, no chills   EYES:     No diplopia, no blurry vision   CV:           No chest pain, no palpitations   PULM:     No SOB, no cough, no hemoptysis.   GI:            No nausea, no vomiting, no diarrhea, no constipation   ENDO:     No polyuria, no polydipsia, no heat intolerance, no cold intolerance       Past Medical History:  Problem List:  2018-02: Encounter to establish care with new doctor  2018-02: Need for vaccination  2018-02: Type 2 diabetes mellitus with complication, without long-  term current use of insulin (HCC)  2018-02: Hypertriglyceridemia      Past Surgical History:  Past Surgical History:   Procedure Laterality Date   • LEON SALAMANCA W ANESTHESIA Right 8/22/2019     Procedure: MANIPULATION, SHOULDER;  Surgeon: Karon Fisher M.D.;  Location: SURGERY [facility];  Service: Orthopedics   • PB SHLDR ARTHROSCOP,PART ACROMIOPLAS Right 8/22/2019    Procedure: DECOMPRESSION, SHOULDER, ARTHROSCOPIC- SUBACROMIAL;  Surgeon: Karon Fisher M.D.;  Location: SURGERY [facility];  Service: Orthopedics   • SHOULDER ARTHROSCOPY Right 8/22/2019    Procedure: ARTHROSCOPY, SHOULDER- CAPSULE RELEASE;  Surgeon: Karon Fisher M.D.;  Location: SURGERY [facility];  Service: Orthopedics   • COLONOSCOPY  01/2019        Allergies:  Patient has no known allergies.     Social History:  Social History     Tobacco Use   • Smoking status: Never Smoker   • Smokeless tobacco: Never Used   Substance Use Topics   • Alcohol use: Yes     Comment: 6 per month   • Drug use: No        Family History:   family history is not on file.      PHYSICAL EXAM:   OBJECTIVE:  Vital signs: There were no vitals taken for this visit.  GENERAL: Well-developed, well-nourished in no apparent distress.   EYE:  No ocular asymmetry, PERRLA  HENT: Pink, moist mucous membranes.    NECK: No thyromegaly.   CARDIOVASCULAR: Normal precordial impulse seen with normal carotid pulsation  LUNGS: Symmetrical chest expansion with normal phonation of voice   ABDOMEN: Obese abdomen with no visible organomegaly  EXTREMITIES: No clubbing, cyanosis, or edema.   NEUROLOGICAL: No gross focal motor abnormalities   LYMPH: No cervical adenopathy seen.   SKIN: No rashes, lesions.       Labs:  Lab Results   Component Value Date/Time    HBA1C 6.5 (A) 06/11/2019 03:31 PM        Lab Results   Component Value Date/Time    WBC 5.2 05/09/2018 07:34 AM    RBC 4.92 05/09/2018 07:34 AM    HEMOGLOBIN 15.1 05/09/2018 07:34 AM    MCV 93 05/09/2018 07:34 AM    MCH 30.7 05/09/2018 07:34 AM    MCHC 33.0 05/09/2018 07:34 AM    RDW 13.4 05/09/2018 07:34 AM       Lab Results   Component Value Date/Time    SODIUM 141 08/16/2019 02:35 PM     POTASSIUM 4.5 08/16/2019 02:35 PM    CHLORIDE 105 08/16/2019 02:35 PM    CO2 28 08/16/2019 02:35 PM    ANION 8.0 08/16/2019 02:35 PM    GLUCOSE 123 (H) 08/16/2019 02:35 PM    BUN 15 08/16/2019 02:35 PM    CREATININE 0.96 08/16/2019 02:35 PM    CALCIUM 9.7 08/16/2019 02:35 PM    ASTSGOT 19 05/09/2018 07:34 AM    ALTSGPT 25 05/09/2018 07:34 AM    TBILIRUBIN 0.5 05/09/2018 07:34 AM    ALBUMIN 4.4 05/09/2018 07:34 AM    TOTPROTEIN 6.7 05/09/2018 07:34 AM    GLOBULIN 2.3 05/09/2018 07:34 AM    AGRATIO 1.9 05/09/2018 07:34 AM       Lab Results   Component Value Date/Time    CHOLSTRLTOT 171 05/09/2018 0734    TRIGLYCERIDE 167 (H) 05/09/2018 0734    HDL 45 05/09/2018 0734    LDL 93 05/09/2018 0734       No results found for: MICROALBCALC, MALBCRT, MALBEXCR, LMICQW76, MICROALBUR, MICRALB, UMICROALBUM, MICROALBTIM     No results found for: TSHULTRASEN  No results found for: FREEDIR  No results found for: FREET3  No results found for: THYSTIMIG        ASSESSMENT/PLAN:     1. Type 2 diabetes mellitus with complication, without long-term current use of insulin (HCC)  Unstable  He is overdue for labs I recommend checking A1c, CMP fasting lipid profile and other labs today  I will update him  I want him to continue taking Ozempic and Xigduo XR at the present doses as listed above  I recommend that he watch his carb intake and exercise regularly  We will plan for follow-up in 3 months with a repeat of his A1c    2. Dyslipidemia  Unstable  I am checking a fasting lipid profile today and I will update him  I recommend a low-fat diet  I do not recommend statin therapy because he is very young and is at low risk for cardiovascular disease    3. Long term (current) use of oral hypoglycemic drugs  Patient is on multiple oral agents for type 2 diabetes management      Return in about 3 months (around 8/11/2020).      Thank you kindly for allowing me to participate in the diabetes care plan for this patient.    Eldon Cassidy MD, FACE,  [facility]  05/11/20    CC:   IZZY Zelaya

## 2020-07-15 ENCOUNTER — HOSPITAL ENCOUNTER (OUTPATIENT)
Dept: LAB | Facility: MEDICAL CENTER | Age: 40
End: 2020-07-15
Attending: INTERNAL MEDICINE
Payer: COMMERCIAL

## 2020-07-15 DIAGNOSIS — E11.8 TYPE 2 DIABETES MELLITUS WITH COMPLICATION, WITHOUT LONG-TERM CURRENT USE OF INSULIN (HCC): ICD-10-CM

## 2020-07-15 DIAGNOSIS — E78.5 DYSLIPIDEMIA: ICD-10-CM

## 2020-07-15 DIAGNOSIS — Z79.84 LONG TERM (CURRENT) USE OF ORAL HYPOGLYCEMIC DRUGS: ICD-10-CM

## 2020-07-15 LAB
ALBUMIN SERPL BCP-MCNC: 4.7 G/DL (ref 3.2–4.9)
ALBUMIN/GLOB SERPL: 2.4 G/DL
ALP SERPL-CCNC: 38 U/L (ref 30–99)
ALT SERPL-CCNC: 17 U/L (ref 2–50)
ANION GAP SERPL CALC-SCNC: 11 MMOL/L (ref 7–16)
AST SERPL-CCNC: 14 U/L (ref 12–45)
BILIRUB SERPL-MCNC: 0.4 MG/DL (ref 0.1–1.5)
BUN SERPL-MCNC: 17 MG/DL (ref 8–22)
CALCIUM SERPL-MCNC: 9.1 MG/DL (ref 8.5–10.5)
CHLORIDE SERPL-SCNC: 103 MMOL/L (ref 96–112)
CHOLEST SERPL-MCNC: 149 MG/DL (ref 100–199)
CO2 SERPL-SCNC: 27 MMOL/L (ref 20–33)
CREAT SERPL-MCNC: 0.94 MG/DL (ref 0.5–1.4)
CREAT UR-MCNC: 118.89 MG/DL
EST. AVERAGE GLUCOSE BLD GHB EST-MCNC: 166 MG/DL
FASTING STATUS PATIENT QL REPORTED: NORMAL
GLOBULIN SER CALC-MCNC: 2 G/DL (ref 1.9–3.5)
GLUCOSE SERPL-MCNC: 126 MG/DL (ref 65–99)
HBA1C MFR BLD: 7.4 % (ref 0–5.6)
HDLC SERPL-MCNC: 65 MG/DL
LDLC SERPL CALC-MCNC: 68 MG/DL
MICROALBUMIN UR-MCNC: 1.9 MG/DL
MICROALBUMIN/CREAT UR: 16 MG/G (ref 0–30)
POTASSIUM SERPL-SCNC: 4.8 MMOL/L (ref 3.6–5.5)
PROT SERPL-MCNC: 6.7 G/DL (ref 6–8.2)
SODIUM SERPL-SCNC: 141 MMOL/L (ref 135–145)
TRIGL SERPL-MCNC: 80 MG/DL (ref 0–149)
TSH SERPL DL<=0.005 MIU/L-ACNC: 1.06 UIU/ML (ref 0.38–5.33)

## 2020-07-15 PROCEDURE — 80053 COMPREHEN METABOLIC PANEL: CPT

## 2020-07-15 PROCEDURE — 84443 ASSAY THYROID STIM HORMONE: CPT

## 2020-07-15 PROCEDURE — 83036 HEMOGLOBIN GLYCOSYLATED A1C: CPT

## 2020-07-15 PROCEDURE — 82043 UR ALBUMIN QUANTITATIVE: CPT

## 2020-07-15 PROCEDURE — 36415 COLL VENOUS BLD VENIPUNCTURE: CPT

## 2020-07-15 PROCEDURE — 82570 ASSAY OF URINE CREATININE: CPT

## 2020-07-15 PROCEDURE — 80061 LIPID PANEL: CPT

## 2020-07-31 DIAGNOSIS — E11.8 TYPE 2 DIABETES MELLITUS WITH COMPLICATION, WITHOUT LONG-TERM CURRENT USE OF INSULIN (HCC): ICD-10-CM

## 2020-07-31 RX ORDER — SEMAGLUTIDE 1.34 MG/ML
1 INJECTION, SOLUTION SUBCUTANEOUS
Qty: 6 PEN | Refills: 2 | Status: SHIPPED | OUTPATIENT
Start: 2020-07-31 | End: 2021-05-28

## 2020-08-11 ENCOUNTER — OFFICE VISIT (OUTPATIENT)
Dept: ENDOCRINOLOGY | Facility: MEDICAL CENTER | Age: 40
End: 2020-08-11
Attending: INTERNAL MEDICINE
Payer: COMMERCIAL

## 2020-08-11 VITALS
WEIGHT: 159.3 LBS | HEART RATE: 84 BPM | BODY MASS INDEX: 21.11 KG/M2 | OXYGEN SATURATION: 99 % | HEIGHT: 73 IN | DIASTOLIC BLOOD PRESSURE: 78 MMHG | SYSTOLIC BLOOD PRESSURE: 122 MMHG

## 2020-08-11 DIAGNOSIS — E11.8 TYPE 2 DIABETES MELLITUS WITH COMPLICATION, WITHOUT LONG-TERM CURRENT USE OF INSULIN (HCC): ICD-10-CM

## 2020-08-11 DIAGNOSIS — Z79.84 LONG TERM (CURRENT) USE OF ORAL HYPOGLYCEMIC DRUGS: ICD-10-CM

## 2020-08-11 DIAGNOSIS — E78.5 DYSLIPIDEMIA: ICD-10-CM

## 2020-08-11 PROCEDURE — 99214 OFFICE O/P EST MOD 30 MIN: CPT | Performed by: INTERNAL MEDICINE

## 2020-08-11 PROCEDURE — 99211 OFF/OP EST MAY X REQ PHY/QHP: CPT | Performed by: INTERNAL MEDICINE

## 2020-08-11 RX ORDER — EMPAGLIFLOZIN AND METFORMIN HYDROCHLORIDE 12.5; 1 MG/1; MG/1
1 TABLET ORAL 2 TIMES DAILY
Qty: 60 TAB | Refills: 6 | Status: SHIPPED | OUTPATIENT
Start: 2020-08-11 | End: 2020-11-13

## 2020-08-11 NOTE — PROGRESS NOTES
CHIEF COMPLAINT: Patient is here for follow up of Type 2 Diabetes Mellitus.    HPI:     Jose De Jesus Banda is a 39 y.o. male with Type 2 Diabetes Mellitus here for follow up.    Labs from July 15, 2020 show A1c has increased slightly to 7.4%    He is a very thin gentleman and was previously seen by the PA, we do not have a baseline C-peptide and yasmine 65 antibody level      He was on Ozempic and Xigduo, He is now on Ozempic 1.0mg weekly, Synjardy 12.5mg 1000mg bid    He reports that he is doing well and is tolerating his DM meds  He denies hyperglycemia  He denies hypoglycemia    He has a history of hyperlipidemia but is not on a statin because he still very young.  His last LDL cholesterol was 68 with triglycerides of 80 and a total cholesterol of 149 on July 15, 2020    He denies a history of essential hypertension  He does not have diabetic kidney disease, his last urine microalbumin creatinine ratio was normal at 16 on July 2020    His baseline TSH is normal at 1.0 on July 2020      BG Diary:  Breakfast: 120-140    Weight has been stable    Diabetes Complications   Retinopathy: No known retinopathy.  Last eye exam: January 2020 at [facility]  Neuropathy: Denies paresthesias or numbness in hands or feet. Denies any foot wounds.  Exercise: Minimal.  Diet: Fair.  Patient's medications, allergies, and social histories were reviewed and updated as appropriate.    ROS:     CONS:     No fever, no chills   EYES:     No diplopia, no blurry vision   CV:           No chest pain, no palpitations   PULM:     No SOB, no cough, no hemoptysis.   GI:            No nausea, no vomiting, no diarrhea, no constipation   ENDO:     No polyuria, no polydipsia, no heat intolerance, no cold intolerance       Past Medical History:  Problem List:  2018-02: Encounter to establish care with new doctor  2018-02: Need for vaccination  2018-02: Type 2 diabetes mellitus with complication, without long-  term current use of insulin ([facility])  2018-02:  "Hypertriglyceridemia      Past Surgical History:  Past Surgical History:   Procedure Laterality Date   • PB MANIPULATN SHLDR JT W ANESTHESIA Right 8/22/2019    Procedure: MANIPULATION, SHOULDER;  Surgeon: Karon Fisher M.D.;  Location: SURGERY [facility];  Service: Orthopedics   • PB SHLDR ARTHROSCOP,PART ACROMIOPLAS Right 8/22/2019    Procedure: DECOMPRESSION, SHOULDER, ARTHROSCOPIC- SUBACROMIAL;  Surgeon: Karon Fisher M.D.;  Location: SURGERY [facility];  Service: Orthopedics   • SHOULDER ARTHROSCOPY Right 8/22/2019    Procedure: ARTHROSCOPY, SHOULDER- CAPSULE RELEASE;  Surgeon: Karon Fisher M.D.;  Location: SURGERY [facility];  Service: Orthopedics   • COLONOSCOPY  01/2019        Allergies:  Patient has no known allergies.     Social History:  Social History     Tobacco Use   • Smoking status: Never Smoker   • Smokeless tobacco: Never Used   Substance Use Topics   • Alcohol use: Yes     Comment: 6 per month   • Drug use: No        Family History:   family history is not on file.      PHYSICAL EXAM:   OBJECTIVE:  Vital signs: /78 (BP Location: Left arm, Patient Position: Sitting, BP Cuff Size: Adult)   Pulse 84   Ht 1.854 m (6' 1\")   Wt 72.3 kg (159 lb 4.8 oz)   SpO2 99%   BMI 21.02 kg/m²   GENERAL: Well-developed, well-nourished in no apparent distress.   EYE:  No ocular asymmetry, PERRLA  HENT: Pink, moist mucous membranes.    NECK: No thyromegaly.   CARDIOVASCULAR: Normal precordial impulse seen with normal carotid pulsation  LUNGS: Symmetrical chest expansion with normal phonation of voice   ABDOMEN: Obese abdomen with no visible organomegaly  EXTREMITIES: No clubbing, cyanosis, or edema.   NEUROLOGICAL: No gross focal motor abnormalities   LYMPH: No cervical adenopathy seen.   SKIN: No rashes, lesions.   Monofilament testing with a 10 gram force: sensation: intact bilaterally  Visual Inspection: Feet without maceration, ulcers, or fissures.  Pedal pulses: " intact bilaterally      Labs:  Lab Results   Component Value Date/Time    HBA1C 7.4 (H) 07/15/2020 08:51 AM        Lab Results   Component Value Date/Time    WBC 5.2 05/09/2018 07:34 AM    RBC 4.92 05/09/2018 07:34 AM    HEMOGLOBIN 15.1 05/09/2018 07:34 AM    MCV 93 05/09/2018 07:34 AM    MCH 30.7 05/09/2018 07:34 AM    MCHC 33.0 05/09/2018 07:34 AM    RDW 13.4 05/09/2018 07:34 AM       Lab Results   Component Value Date/Time    SODIUM 141 07/15/2020 08:51 AM    POTASSIUM 4.8 07/15/2020 08:51 AM    CHLORIDE 103 07/15/2020 08:51 AM    CO2 27 07/15/2020 08:51 AM    ANION 11.0 07/15/2020 08:51 AM    GLUCOSE 126 (H) 07/15/2020 08:51 AM    BUN 17 07/15/2020 08:51 AM    CREATININE 0.94 07/15/2020 08:51 AM    CALCIUM 9.1 07/15/2020 08:51 AM    ASTSGOT 14 07/15/2020 08:51 AM    ALTSGPT 17 07/15/2020 08:51 AM    TBILIRUBIN 0.4 07/15/2020 08:51 AM    ALBUMIN 4.7 07/15/2020 08:51 AM    TOTPROTEIN 6.7 07/15/2020 08:51 AM    GLOBULIN 2.0 07/15/2020 08:51 AM    AGRATIO 2.4 07/15/2020 08:51 AM       Lab Results   Component Value Date/Time    CHOLSTRLTOT 171 05/09/2018 0734    TRIGLYCERIDE 167 (H) 05/09/2018 0734    HDL 45 05/09/2018 0734    LDL 93 05/09/2018 0734       Lab Results   Component Value Date/Time    MALBCRT 16 07/15/2020 08:50 AM    MICROALBUR 1.9 07/15/2020 08:50 AM        No results found for: TSHULTRASEN  No results found for: FREEDIR  No results found for: FREET3  No results found for: THYSTIMIG        ASSESSMENT/PLAN:     1. Type 2 diabetes mellitus with complication, without long-term current use of insulin (HCC)  Well-controlled  A1c has increased slightly to 7.4%  He is very thin and I am concerned that he may have latent autoimmune diabetes of adulthood instead of type 2 diabetes  I recommend that we check a C-peptide and yasmine 65 antibody level with a plasma glucose with his upcoming labs  For now I recommend that he continue taking Synjardy and Ozempic  I recommend that he watch for signs of uncontrolled  hyperglycemia such as polyuria and polydipsia  I recommend that he watch his carb intake  I recommend regular exercise  Recommend follow-up in 3 months with repeat of his A1c and other labs described above    2. Dyslipidemia  Well-controlled  Follow low-fat diet  Repeat fasting lipids in 12 months    3. Long term (current) use of oral hypoglycemic drugs  Patient is on oral agents and a GLP-1 for diabetes management he may have latent autoimmune diabetes of adulthood because of his very thin habitus      Return in about 3 months (around 11/11/2020).      Thank you kindly for allowing me to participate in the diabetes care plan for this patient.    Eldon Cassidy MD, FACE, [facility]  05/11/20    CC:   IZZY Zelaya

## 2020-10-14 ENCOUNTER — HOSPITAL ENCOUNTER (OUTPATIENT)
Dept: LAB | Facility: MEDICAL CENTER | Age: 40
End: 2020-10-14
Attending: INTERNAL MEDICINE
Payer: COMMERCIAL

## 2020-10-14 DIAGNOSIS — E78.5 DYSLIPIDEMIA: ICD-10-CM

## 2020-10-14 DIAGNOSIS — E11.8 TYPE 2 DIABETES MELLITUS WITH COMPLICATION, WITHOUT LONG-TERM CURRENT USE OF INSULIN (HCC): ICD-10-CM

## 2020-10-14 DIAGNOSIS — Z79.84 LONG TERM (CURRENT) USE OF ORAL HYPOGLYCEMIC DRUGS: ICD-10-CM

## 2020-10-14 LAB
ALBUMIN SERPL BCP-MCNC: 4.7 G/DL (ref 3.2–4.9)
ALBUMIN/GLOB SERPL: 2.2 G/DL
ALP SERPL-CCNC: 38 U/L (ref 30–99)
ALT SERPL-CCNC: 21 U/L (ref 2–50)
ANION GAP SERPL CALC-SCNC: 9 MMOL/L (ref 7–16)
AST SERPL-CCNC: 15 U/L (ref 12–45)
BILIRUB SERPL-MCNC: 0.5 MG/DL (ref 0.1–1.5)
BUN SERPL-MCNC: 18 MG/DL (ref 8–22)
CALCIUM SERPL-MCNC: 9.1 MG/DL (ref 8.5–10.5)
CHLORIDE SERPL-SCNC: 101 MMOL/L (ref 96–112)
CO2 SERPL-SCNC: 28 MMOL/L (ref 20–33)
CREAT SERPL-MCNC: 0.93 MG/DL (ref 0.5–1.4)
EST. AVERAGE GLUCOSE BLD GHB EST-MCNC: 166 MG/DL
FASTING STATUS PATIENT QL REPORTED: NORMAL
GLOBULIN SER CALC-MCNC: 2.1 G/DL (ref 1.9–3.5)
GLUCOSE SERPL-MCNC: 163 MG/DL (ref 65–99)
HBA1C MFR BLD: 7.4 % (ref 0–5.6)
POTASSIUM SERPL-SCNC: 4.3 MMOL/L (ref 3.6–5.5)
PROT SERPL-MCNC: 6.8 G/DL (ref 6–8.2)
SODIUM SERPL-SCNC: 138 MMOL/L (ref 135–145)

## 2020-10-14 PROCEDURE — 84681 ASSAY OF C-PEPTIDE: CPT

## 2020-10-14 PROCEDURE — 86341 ISLET CELL ANTIBODY: CPT

## 2020-10-14 PROCEDURE — 80053 COMPREHEN METABOLIC PANEL: CPT

## 2020-10-14 PROCEDURE — 83036 HEMOGLOBIN GLYCOSYLATED A1C: CPT

## 2020-10-14 PROCEDURE — 36415 COLL VENOUS BLD VENIPUNCTURE: CPT

## 2020-10-16 LAB — C PEPTIDE SERPL-MCNC: 1.3 NG/ML (ref 0.8–3.5)

## 2020-10-19 LAB — GAD65 AB SER IA-ACNC: <5 IU/ML (ref 0–5)

## 2020-11-13 ENCOUNTER — TELEMEDICINE (OUTPATIENT)
Dept: ENDOCRINOLOGY | Facility: MEDICAL CENTER | Age: 40
End: 2020-11-13
Attending: INTERNAL MEDICINE
Payer: COMMERCIAL

## 2020-11-13 DIAGNOSIS — E78.5 DYSLIPIDEMIA: ICD-10-CM

## 2020-11-13 DIAGNOSIS — Z79.84 LONG TERM (CURRENT) USE OF ORAL HYPOGLYCEMIC DRUGS: ICD-10-CM

## 2020-11-13 DIAGNOSIS — E11.8 TYPE 2 DIABETES MELLITUS WITH COMPLICATION, WITHOUT LONG-TERM CURRENT USE OF INSULIN (HCC): ICD-10-CM

## 2020-11-13 PROCEDURE — 99214 OFFICE O/P EST MOD 30 MIN: CPT | Mod: 95,CR | Performed by: INTERNAL MEDICINE

## 2020-11-13 RX ORDER — PIOGLITAZONEHYDROCHLORIDE 30 MG/1
30 TABLET ORAL DAILY
Qty: 90 TAB | Refills: 3 | Status: SHIPPED | OUTPATIENT
Start: 2020-11-13 | End: 2021-02-12

## 2020-11-13 RX ORDER — EMPAGLIFLOZIN, METFORMIN HYDROCHLORIDE 12.5; 1 MG/1; MG/1
2 TABLET, EXTENDED RELEASE ORAL DAILY
Qty: 180 TAB | Refills: 3 | Status: SHIPPED | OUTPATIENT
Start: 2020-11-13 | End: 2021-05-28

## 2020-11-13 RX ORDER — SEMAGLUTIDE 1.34 MG/ML
1 INJECTION, SOLUTION SUBCUTANEOUS
Qty: 6 EACH | Refills: 3 | Status: SHIPPED | OUTPATIENT
Start: 2020-11-13 | End: 2021-05-28

## 2020-11-13 NOTE — PROGRESS NOTES
CHIEF COMPLAINT: Patient is here for follow up of Type 2 Diabetes Mellitus.  Patient was presented for a telehealth consultation via secure and encrypted videoconferencing technology. This encounter was conducted via Zoom . Verbal consent was obtained. Patient's identity was verified.    HPI:     Jose De Jesus Banda is a 39 y.o. male with Type 2 Diabetes Mellitus here for follow up.    Labs from 10/14/2020 show a1c a1c is stable at 7.4%  Labs from July 15, 2020 show A1c has increased slightly to 7.4%    He is a very thin gentleman and was previously seen by the PA, I did obtain some labs because he has a slim habitus and his C-peptide is detectable at 1.3 and yasmine 65 antibodies are negative      He was on Ozempic and Xigduo, He is now on Ozempic 1.0mg weekly, Synjardy 12.5mg 1000mg bid    He has diarrhea with regular synjardy - he wants the XR  He admits hyperglycemia  He denies hypoglycemia    He has a history of hyperlipidemia but is not on a statin because he still very young.  His last LDL cholesterol was 68 with triglycerides of 80 and a total cholesterol of 149 on July 15, 2020    He denies a history of essential hypertension  He does not have diabetic kidney disease, his last urine microalbumin creatinine ratio was normal at 16 on July 2020    His baseline TSH is normal at 1.0 on July 2020      BG Diary:  Breakfast: 130-160    Weight has been stable    Diabetes Complications   Retinopathy: No known retinopathy.  Last eye exam: January 2020 at [facility] he had a negative eye exam with his ophthalmologist   Neuropathy: Denies paresthesias or numbness in hands or feet. Denies any foot wounds.  Exercise: Minimal.  Diet: Fair.  Patient's medications, allergies, and social histories were reviewed and updated as appropriate.    ROS:     CONS:     No fever, no chills   EYES:     No diplopia, no blurry vision   CV:           No chest pain, no palpitations   PULM:     No SOB, no cough, no hemoptysis.   GI:            No  nausea, no vomiting, no diarrhea, no constipation   ENDO:     No polyuria, no polydipsia, no heat intolerance, no cold intolerance       Past Medical History:  Problem List:  2018-02: Encounter to establish care with new doctor  2018-02: Need for vaccination  2018-02: Type 2 diabetes mellitus with complication, without long-  term current use of insulin ([facility])  2018-02: Hypertriglyceridemia      Past Surgical History:  Past Surgical History:   Procedure Laterality Date   • PB MANIPULATN SHLDR JT W ANESTHESIA Right 8/22/2019    Procedure: MANIPULATION, SHOULDER;  Surgeon: Karon Fisher M.D.;  Location: SURGERY [facility];  Service: Orthopedics   • PB SHLDR ARTHROSCOP,PART ACROMIOPLAS Right 8/22/2019    Procedure: DECOMPRESSION, SHOULDER, ARTHROSCOPIC- SUBACROMIAL;  Surgeon: Karon Fisher M.D.;  Location: [facility];  Service: Orthopedics   • SHOULDER ARTHROSCOPY Right 8/22/2019    Procedure: ARTHROSCOPY, SHOULDER- CAPSULE RELEASE;  Surgeon: Karon Fisher M.D.;  Location: SURGERY [facility];  Service: Orthopedics   • COLONOSCOPY  01/2019        Allergies:  Patient has no known allergies.     Social History:  Social History     Tobacco Use   • Smoking status: Never Smoker   • Smokeless tobacco: Never Used   Substance Use Topics   • Alcohol use: Yes     Comment: 6 per month   • Drug use: No        Family History:   family history is not on file.      PHYSICAL EXAM:   OBJECTIVE:  Vital signs: There were no vitals taken for this visit.  GENERAL: Well-developed, well-nourished in no apparent distress.   EYE:  No ocular asymmetry, PERRLA  HENT: Pink, moist mucous membranes.    NECK: No thyromegaly.   CARDIOVASCULAR: Normal precordial impulse seen with normal carotid pulsation  LUNGS: Symmetrical chest expansion with normal phonation of voice   ABDOMEN: Obese abdomen with no visible organomegaly  EXTREMITIES: No clubbing, cyanosis, or edema.   NEUROLOGICAL: No gross focal motor  abnormalities   LYMPH: No cervical adenopathy seen.   SKIN: No rashes, lesions.       Labs:  Lab Results   Component Value Date/Time    HBA1C 7.4 (H) 10/14/2020 09:00 AM        Lab Results   Component Value Date/Time    WBC 5.2 05/09/2018 07:34 AM    RBC 4.92 05/09/2018 07:34 AM    HEMOGLOBIN 15.1 05/09/2018 07:34 AM    MCV 93 05/09/2018 07:34 AM    MCH 30.7 05/09/2018 07:34 AM    MCHC 33.0 05/09/2018 07:34 AM    RDW 13.4 05/09/2018 07:34 AM       Lab Results   Component Value Date/Time    SODIUM 138 10/14/2020 09:00 AM    POTASSIUM 4.3 10/14/2020 09:00 AM    CHLORIDE 101 10/14/2020 09:00 AM    CO2 28 10/14/2020 09:00 AM    ANION 9.0 10/14/2020 09:00 AM    GLUCOSE 163 (H) 10/14/2020 09:00 AM    BUN 18 10/14/2020 09:00 AM    CREATININE 0.93 10/14/2020 09:00 AM    CALCIUM 9.1 10/14/2020 09:00 AM    ASTSGOT 15 10/14/2020 09:00 AM    ALTSGPT 21 10/14/2020 09:00 AM    TBILIRUBIN 0.5 10/14/2020 09:00 AM    ALBUMIN 4.7 10/14/2020 09:00 AM    TOTPROTEIN 6.8 10/14/2020 09:00 AM    GLOBULIN 2.1 10/14/2020 09:00 AM    AGRATIO 2.2 10/14/2020 09:00 AM       Lab Results   Component Value Date/Time    CHOLSTRLTOT 171 05/09/2018 0734    TRIGLYCERIDE 167 (H) 05/09/2018 0734    HDL 45 05/09/2018 0734    LDL 93 05/09/2018 0734       Lab Results   Component Value Date/Time    MALBCRT 16 07/15/2020 08:50 AM    MICROALBUR 1.9 07/15/2020 08:50 AM        No results found for: TSHULTRASEN  No results found for: FREEDIR  No results found for: FREET3  No results found for: THYSTIMIG        ASSESSMENT/PLAN:     1. Type 2 diabetes mellitus with complication, without long-term current use of insulin (HCC)  Well-controlled  A1c has is stable at 7.4%  Recommend starting pioglitazone 30 mg daily  I am replacing his Synjardy regular with extended release form because of GI upset  Continue Ozempic  We did discuss the possibility of starting basal insulin but he prefers to start the Actos first  I recommend that he watch his carb intake  Recommend  follow-up in 3 months with repeat of his A1c    2. Dyslipidemia  Well-controlled  Follow low-fat diet  Repeat fasting lipids in 12 months    3. Long term (current) use of oral hypoglycemic drugs  Patient is on oral agents and a GLP-1 for diabetes management       Return in about 3 months (around 2/13/2021).      Thank you kindly for allowing me to participate in the diabetes care plan for this patient.    Eldon Cassidy MD, [facility], [facility]  05/11/20    CC:   IZZY Zelaya

## 2021-02-12 ENCOUNTER — OFFICE VISIT (OUTPATIENT)
Dept: MEDICAL GROUP | Facility: PHYSICIAN GROUP | Age: 41
End: 2021-02-12
Payer: COMMERCIAL

## 2021-02-12 VITALS
HEART RATE: 88 BPM | SYSTOLIC BLOOD PRESSURE: 112 MMHG | TEMPERATURE: 98.5 F | WEIGHT: 161.6 LBS | OXYGEN SATURATION: 99 % | DIASTOLIC BLOOD PRESSURE: 78 MMHG | RESPIRATION RATE: 14 BRPM | HEIGHT: 73 IN | BODY MASS INDEX: 21.42 KG/M2

## 2021-02-12 DIAGNOSIS — E11.8 TYPE 2 DIABETES MELLITUS WITH COMPLICATION, WITHOUT LONG-TERM CURRENT USE OF INSULIN (HCC): ICD-10-CM

## 2021-02-12 DIAGNOSIS — M25.512 CHRONIC LEFT SHOULDER PAIN: ICD-10-CM

## 2021-02-12 DIAGNOSIS — E11.42 DIABETIC POLYNEUROPATHY ASSOCIATED WITH TYPE 2 DIABETES MELLITUS (HCC): ICD-10-CM

## 2021-02-12 DIAGNOSIS — G89.29 CHRONIC LEFT SHOULDER PAIN: ICD-10-CM

## 2021-02-12 PROBLEM — E78.1 HYPERTRIGLYCERIDEMIA: Status: RESOLVED | Noted: 2018-02-07 | Resolved: 2021-02-12

## 2021-02-12 PROBLEM — E78.5 DYSLIPIDEMIA: Status: RESOLVED | Noted: 2020-11-13 | Resolved: 2021-02-12

## 2021-02-12 PROCEDURE — 99203 OFFICE O/P NEW LOW 30 MIN: CPT | Performed by: FAMILY MEDICINE

## 2021-02-12 RX ORDER — EMPAGLIFLOZIN AND METFORMIN HYDROCHLORIDE 12.5; 1 MG/1; MG/1
1 TABLET ORAL
COMMUNITY
Start: 2020-12-08 | End: 2021-02-19

## 2021-02-12 RX ORDER — PIOGLITAZONEHYDROCHLORIDE 30 MG/1
30 TABLET ORAL DAILY
COMMUNITY
End: 2021-02-19 | Stop reason: SDUPTHER

## 2021-02-12 ASSESSMENT — PATIENT HEALTH QUESTIONNAIRE - PHQ9: CLINICAL INTERPRETATION OF PHQ2 SCORE: 0

## 2021-02-12 NOTE — PROGRESS NOTES
Subjective:     CC:    Chief Complaint   Patient presents with   • Establish Care       HISTORY OF THE PRESENT ILLNESS: Patient is a 40 y.o. male. This pleasant patient is here today to establish care.  Patient does see endocrinologist for his diabetes.  Looks like patient should be getting another hemoglobin A1c done remind patient of this since Dr. Kaur had in November his last hemoglobin A1c was in October.  Patient does have appointment with Dr. Kaur next week.  Patient does state that he has bilateral feet pain he has addressed it with his other endocrinologist in the past and abdomen was done I would recommend he discuss that with Dr. Kaur.  It sounds like patient probably has diabetic neuropathy going.  Patient is also scheduled to have his right shoulder surgery done sometime in March with Ortho.  It appears that patient does not need a preop clearance for me.    No problem-specific Assessment & Plan notes found for this encounter.      Allergies: Patient has no known allergies.    Current Outpatient Medications Ordered in Epic   Medication Sig Dispense Refill   • SYNJARDY 12.5-1000 MG Tab Take 1 tablet by mouth.     • pioglitazone (ACTOS) 30 MG Tab Take 30 mg by mouth every day.     • MILK THISTLE PLUS PO Take  by mouth.     • S-Adenosylmethionine (THEODORA-E PO) Take  by mouth.     • glucose blood (JACQUELINE CONTOUR NEXT TEST) strip 1 Strip by Other route as needed. 100 Strip 3   • loratadine (CLARITIN) 10 MG Tab Take 10 mg by mouth every day.     • fluticasone (FLONASE) 50 MCG/ACT nasal spray Spray 1 Spray in nose every day.       No current [facility]-ordered facility-administered medications on file.       Past Medical History:   Diagnosis Date   • Allergy    • Diabetes (HCC) 05/2018    oral meds   • Infectious disease 08/2019    c-diff 9/18-11/18 pt took vanco, and is better, no active sxs currently   • Pain 08/2019    Right Shoulder       Past Surgical History:   Procedure Laterality Date   • PB  "MANIPEUSEBIO ALVESLDR JT W ANESTHESIA Right 8/22/2019    Procedure: MANIPULATION, SHOULDER;  Surgeon: Karon Fisher M.D.;  Location: SURGERY [facility];  Service: Orthopedics   • PB SHLDR ARTHROSCOP,PART ACROMIOPLAS Right 8/22/2019    Procedure: DECOMPRESSION, SHOULDER, ARTHROSCOPIC- SUBACROMIAL;  Surgeon: Karon Fisher M.D.;  Location: SURGERY [facility];  Service: Orthopedics   • SHOULDER ARTHROSCOPY Right 8/22/2019    Procedure: ARTHROSCOPY, SHOULDER- CAPSULE RELEASE;  Surgeon: Karon Fisher M.D.;  Location: SURGERY [facility];  Service: Orthopedics   • COLONOSCOPY  01/2019       Social History     Tobacco Use   • Smoking status: Never Smoker   • Smokeless tobacco: Never Used   Substance Use Topics   • Alcohol use: Yes     Comment: 6 per month   • Drug use: No       Social History     Social History Narrative   • Not on file       Family History   Problem Relation Age of Onset   • Arthritis Mother    • Stroke Mother    • Diabetes Mother    • Hypertension Father    • Diabetes Father    • Lung Disease Father    • Lung Cancer Maternal Grandmother    • Lung Cancer Paternal Grandfather        Health Maintenance: Completed    ROS:   Gen: no fevers/chills, no changes in weight  Eyes: no changes in vision  ENT: no sore throat, no hearing loss, no bloody nose  Pulm: no sob, no cough, no snoring or stop breathing when sleeping  CV: no chest pain, no palpitations  GI: no nausea/vomiting, no diarrhea, black or bloody stools  Skin: no rash  Neuro: no headaches, no numbness/tingling  Heme/Lymph: no easy bruising      Objective:     Exam: /78   Pulse 88   Temp 36.9 °C (98.5 °F)   Resp 14   Ht 1.854 m (6' 1\")   Wt 73.3 kg (161 lb 9.6 oz)   SpO2 99%  Body mass index is 21.32 kg/m².    Gen: Alert and oriented, No apparent distress.  Skin: Warm and dry.  No obvious lesions.  Eyes: Sclera wnl Pupils normal in size  ENT: Canals wnl and TM are not red, no exudates or lesions noted on oral " exam  Neck: Neck is supple without lymphadenopathy.  Lungs: Normal effort, CTA bilaterally, no wheezes, rhonchi, or rales  CV: Regular rate and rhythm. No murmurs, rubs, or gallops.  ABD: Soft non-tender no organomegaly  Musculoskeletal: Normal gait. No extremity cyanosis, clubbing, or edema.  Neuro: Oriented to person, place and time  Psych: Mood is wnl       Assessment & Plan:   40 y.o. male with the following -    1. Type 2 diabetes mellitus with complication, without long-term current use of insulin (HCC)  Patient to follow-up with his endocrinologist which he has appointment for  will get his labs done prior to seeing him.  His endocrinologist did do a lipid panel last summer that was completely within normal limits recommend I may need to see him back in the summer order this again.  This is a chronic problem    2. Diabetic polyneuropathy associated with type 2 diabetes mellitus (HCC)  It appears that he probably had diabetic neuropathy with his feet patient to bring that discussion up with his endocrinologist.  This is a chronic problem    3. Chronic left shoulder pain  Patient having left shoulder pain he did have right shoulder surgery in the past patient is scheduled to have a procedure done this is a chronic problem      Return in about 6 months (around 8/12/2021).    Please note that this dictation was created using voice recognition software. I have made every reasonable attempt to correct obvious errors, but I expect that there are errors of grammar and possibly content that I did not discover before finalizing the note.

## 2021-02-16 ENCOUNTER — HOSPITAL ENCOUNTER (OUTPATIENT)
Dept: LAB | Facility: MEDICAL CENTER | Age: 41
End: 2021-02-16
Attending: INTERNAL MEDICINE
Payer: COMMERCIAL

## 2021-02-16 DIAGNOSIS — E11.8 TYPE 2 DIABETES MELLITUS WITH COMPLICATION, WITHOUT LONG-TERM CURRENT USE OF INSULIN (HCC): ICD-10-CM

## 2021-02-16 DIAGNOSIS — E78.5 DYSLIPIDEMIA: ICD-10-CM

## 2021-02-16 DIAGNOSIS — Z79.84 LONG TERM (CURRENT) USE OF ORAL HYPOGLYCEMIC DRUGS: ICD-10-CM

## 2021-02-16 LAB
ALBUMIN SERPL BCP-MCNC: 4.7 G/DL (ref 3.2–4.9)
ALBUMIN/GLOB SERPL: 2 G/DL
ALP SERPL-CCNC: 41 U/L (ref 30–99)
ALT SERPL-CCNC: 16 U/L (ref 2–50)
ANION GAP SERPL CALC-SCNC: 12 MMOL/L (ref 7–16)
AST SERPL-CCNC: 18 U/L (ref 12–45)
BILIRUB SERPL-MCNC: 0.3 MG/DL (ref 0.1–1.5)
BUN SERPL-MCNC: 17 MG/DL (ref 8–22)
CALCIUM SERPL-MCNC: 9.6 MG/DL (ref 8.5–10.5)
CHLORIDE SERPL-SCNC: 105 MMOL/L (ref 96–112)
CO2 SERPL-SCNC: 25 MMOL/L (ref 20–33)
CREAT SERPL-MCNC: 0.93 MG/DL (ref 0.5–1.4)
CREAT UR-MCNC: 105.2 MG/DL
EST. AVERAGE GLUCOSE BLD GHB EST-MCNC: 186 MG/DL
GLOBULIN SER CALC-MCNC: 2.4 G/DL (ref 1.9–3.5)
GLUCOSE SERPL-MCNC: 138 MG/DL (ref 65–99)
HBA1C MFR BLD: 8.1 % (ref 0–5.6)
MICROALBUMIN UR-MCNC: 5.4 MG/DL
MICROALBUMIN/CREAT UR: 51 MG/G (ref 0–30)
POTASSIUM SERPL-SCNC: 5.1 MMOL/L (ref 3.6–5.5)
PROT SERPL-MCNC: 7.1 G/DL (ref 6–8.2)
SODIUM SERPL-SCNC: 142 MMOL/L (ref 135–145)

## 2021-02-16 PROCEDURE — 82570 ASSAY OF URINE CREATININE: CPT

## 2021-02-16 PROCEDURE — 80053 COMPREHEN METABOLIC PANEL: CPT

## 2021-02-16 PROCEDURE — 82043 UR ALBUMIN QUANTITATIVE: CPT

## 2021-02-16 PROCEDURE — 36415 COLL VENOUS BLD VENIPUNCTURE: CPT

## 2021-02-16 PROCEDURE — 83036 HEMOGLOBIN GLYCOSYLATED A1C: CPT

## 2021-02-19 ENCOUNTER — TELEMEDICINE (OUTPATIENT)
Dept: ENDOCRINOLOGY | Facility: MEDICAL CENTER | Age: 41
End: 2021-02-19
Attending: INTERNAL MEDICINE
Payer: COMMERCIAL

## 2021-02-19 DIAGNOSIS — E78.5 DYSLIPIDEMIA: ICD-10-CM

## 2021-02-19 DIAGNOSIS — E11.8 TYPE 2 DIABETES MELLITUS WITH COMPLICATION, WITHOUT LONG-TERM CURRENT USE OF INSULIN (HCC): ICD-10-CM

## 2021-02-19 DIAGNOSIS — Z79.84 LONG TERM (CURRENT) USE OF ORAL HYPOGLYCEMIC DRUGS: ICD-10-CM

## 2021-02-19 PROCEDURE — 99214 OFFICE O/P EST MOD 30 MIN: CPT | Mod: 95,CR | Performed by: INTERNAL MEDICINE

## 2021-02-19 RX ORDER — SEMAGLUTIDE 1.34 MG/ML
1 INJECTION, SOLUTION SUBCUTANEOUS
Qty: 3 ML | Refills: 6 | Status: SHIPPED | OUTPATIENT
Start: 2021-02-19 | End: 2021-02-23 | Stop reason: SDUPTHER

## 2021-02-19 RX ORDER — PIOGLITAZONEHYDROCHLORIDE 30 MG/1
30 TABLET ORAL DAILY
Qty: 90 TABLET | Refills: 3 | Status: SHIPPED | OUTPATIENT
Start: 2021-02-19 | End: 2021-02-23 | Stop reason: SDUPTHER

## 2021-02-19 RX ORDER — EMPAGLIFLOZIN, METFORMIN HYDROCHLORIDE 12.5; 1 MG/1; MG/1
2 TABLET, EXTENDED RELEASE ORAL DAILY
Qty: 60 TABLET | Refills: 6 | Status: SHIPPED | OUTPATIENT
Start: 2021-02-19 | End: 2021-02-23 | Stop reason: SDUPTHER

## 2021-02-19 NOTE — PROGRESS NOTES
CHIEF COMPLAINT: Patient is here for follow up of Type 2 Diabetes Mellitus.  Patient was presented for a telehealth consultation via secure and encrypted videoconferencing technology. This encounter was conducted via Zoom . Verbal consent was obtained. Patient's identity was verified.    HPI:     Jose De Jesus Banda is a 39 y.o. male with Type 2 Diabetes Mellitus here for follow up.    Labs from February 16, 2021 show A1c is higher at 8.1%  Labs from 10/14/2020 show a1c a1c was 7.4%  Labs from July 15, 2020 show A1c was 7.4%    He is a very thin gentleman and was previously seen by the PA, I did obtain some labs because he has a slim habitus and his C-peptide is detectable at 1.3 and yasmine 65 antibodies are negative ruling out BENSON and type 1 diabetes      On follow up he is now on Ozempic 1.0mg weekly, Synjardy XR 12.5/1000mg bid and Actos 30mg daily    He admits to noncompliance with his medications and his diet  He reports stress due to father's death from COVID  He also got a steroid injection which cause severe hyperglycemia      He has a history of hyperlipidemia but is not on a statin because he still very young.  His last LDL cholesterol was 68 with triglycerides of 80 and a total cholesterol of 149 on July 15, 2020      He denies a history of essential hypertension  His most recent urine microalbumin is elevated 51 previously was normal back on July 2020.  He is currently not on an ACE inhibitor    His baseline TSH is normal at 1.0 on July 2020      BG Diary:  Breakfast: 130-160    Weight has been stable    Diabetes Complications   Retinopathy: No known retinopathy.  Last eye exam: January 2020 at [facility] he had a negative eye exam with his ophthalmologist   Neuropathy: Denies paresthesias or numbness in hands or feet. Denies any foot wounds.  Exercise: Minimal.  Diet: Fair.  Patient's medications, allergies, and social histories were reviewed and updated as appropriate.    ROS:     CONS:     No fever, no  chills   EYES:     No diplopia, no blurry vision   CV:           No chest pain, no palpitations   PULM:     No SOB, no cough, no hemoptysis.   GI:            No nausea, no vomiting, no diarrhea, no constipation   ENDO:     No polyuria, no polydipsia, no heat intolerance, no cold intolerance       Past Medical History:  Problem List:  2021-02: Chronic left shoulder pain  2021-02: Diabetic polyneuropathy (HCC)  2020-11: Dyslipidemia  2020-11: Long term (current) use of oral hypoglycemic drugs  2018-02: Encounter to establish care with new doctor  2018-02: Need for vaccination  2018-02: Type 2 diabetes mellitus with complication, without long-  term current use of insulin ([facility])  2018-02: Hypertriglyceridemia      Past Surgical History:  Past Surgical History:   Procedure Laterality Date   • PB MANIPULATN SHLDR JT W ANESTHESIA Right 8/22/2019    Procedure: MANIPULATION, SHOULDER;  Surgeon: Karon Fisher M.D.;  Location: SURGERY [facility];  Service: Orthopedics   • PB SHLDR ARTHROSCOP,PART ACROMIOPLAS Right 8/22/2019    Procedure: DECOMPRESSION, SHOULDER, ARTHROSCOPIC- SUBACROMIAL;  Surgeon: Karon Fisher M.D.;  Location: SURGERY [facility];  Service: Orthopedics   • SHOULDER ARTHROSCOPY Right 8/22/2019    Procedure: ARTHROSCOPY, SHOULDER- CAPSULE RELEASE;  Surgeon: Karon Fisher M.D.;  Location: [facility];  Service: Orthopedics   • COLONOSCOPY  01/2019        Allergies:  Patient has no known allergies.     Social History:  Social History     Tobacco Use   • Smoking status: Never Smoker   • Smokeless tobacco: Never Used   Substance Use Topics   • Alcohol use: Yes     Comment: 6 per month   • Drug use: No        Family History:   family history includes Arthritis in his mother; Diabetes in his father and mother; Hypertension in his father; Lung Cancer in his maternal grandmother and paternal grandfather; Lung Disease in his father; Stroke in his mother.      PHYSICAL EXAM:    OBJECTIVE:  Vital signs: There were no vitals taken for this visit.  GENERAL: Well-developed, well-nourished in no apparent distress.   EYE:  No ocular asymmetry, PERRLA  HENT: Pink, moist mucous membranes.    NECK: No thyromegaly.   CARDIOVASCULAR: Normal precordial impulse seen with normal carotid pulsation  LUNGS: Symmetrical chest expansion with normal phonation of voice   ABDOMEN: Obese abdomen with no visible organomegaly  EXTREMITIES: No clubbing, cyanosis, or edema.   NEUROLOGICAL: No gross focal motor abnormalities   LYMPH: No cervical adenopathy seen.   SKIN: No rashes, lesions.       Labs:  Lab Results   Component Value Date/Time    HBA1C 8.1 (H) 02/16/2021 12:31 PM        Lab Results   Component Value Date/Time    WBC 5.2 05/09/2018 07:34 AM    RBC 4.92 05/09/2018 07:34 AM    HEMOGLOBIN 15.1 05/09/2018 07:34 AM    MCV 93 05/09/2018 07:34 AM    MCH 30.7 05/09/2018 07:34 AM    MCHC 33.0 05/09/2018 07:34 AM    RDW 13.4 05/09/2018 07:34 AM       Lab Results   Component Value Date/Time    SODIUM 142 02/16/2021 12:31 PM    POTASSIUM 5.1 02/16/2021 12:31 PM    CHLORIDE 105 02/16/2021 12:31 PM    CO2 25 02/16/2021 12:31 PM    ANION 12.0 02/16/2021 12:31 PM    GLUCOSE 138 (H) 02/16/2021 12:31 PM    BUN 17 02/16/2021 12:31 PM    CREATININE 0.93 02/16/2021 12:31 PM    CALCIUM 9.6 02/16/2021 12:31 PM    ASTSGOT 18 02/16/2021 12:31 PM    ALTSGPT 16 02/16/2021 12:31 PM    TBILIRUBIN 0.3 02/16/2021 12:31 PM    ALBUMIN 4.7 02/16/2021 12:31 PM    TOTPROTEIN 7.1 02/16/2021 12:31 PM    GLOBULIN 2.4 02/16/2021 12:31 PM    AGRATIO 2.0 02/16/2021 12:31 PM       Lab Results   Component Value Date/Time    CHOLSTRLTOT 171 05/09/2018 0734    TRIGLYCERIDE 167 (H) 05/09/2018 0734    HDL 45 05/09/2018 0734    LDL 93 05/09/2018 0734       Lab Results   Component Value Date/Time    MALBCRT 51 (H) 02/16/2021 12:31 PM    MICROALBUR 5.4 02/16/2021 12:31 PM        No results found for: TSHULTRASEN  No results found for: FREEDIR  No  results found for: FREET3  No results found for: THYSTIMIG        ASSESSMENT/PLAN:     1. Type 2 diabetes mellitus with complication, without long-term current use of insulin (HCC)  Previously fairly controlled now uncontrolled  Continue current medications  He is going to do better with medication compliance and dietary compliance  Explained him the if his next A1c is elevated we will start him on basal insulin therapy  Recommend that he get an updated eye exam  I recommend that he watch his carb intake  I will repeat his urine microalbumin as well in 3 months and start him on an ACE inhibitor if he has persistent microalbuminuria  Recommend follow-up in 3 months with repeat of his A1c    2. Dyslipidemia  Well-controlled  Follow low-fat diet  I am repeating a fasting lipid profile with his next labs    3. Long term (current) use of oral hypoglycemic drugs  Patient is on oral agents and a GLP-1 for diabetes management       Return in about 3 months (around 5/19/2021).      Thank you kindly for allowing me to participate in the diabetes care plan for this patient.    Eldon Cassidy MD, [facility], [facility]  05/11/20    CC:   IZZY Zelaya

## 2021-02-23 DIAGNOSIS — E11.8 TYPE 2 DIABETES MELLITUS WITH COMPLICATION, WITHOUT LONG-TERM CURRENT USE OF INSULIN (HCC): ICD-10-CM

## 2021-02-23 PROCEDURE — RXMED WILLOW AMBULATORY MEDICATION CHARGE: Performed by: INTERNAL MEDICINE

## 2021-02-23 RX ORDER — SEMAGLUTIDE 1.34 MG/ML
1 INJECTION, SOLUTION SUBCUTANEOUS
Qty: 3 ML | Refills: 6 | Status: SHIPPED | OUTPATIENT
Start: 2021-02-23 | End: 2021-05-28 | Stop reason: SDUPTHER

## 2021-02-23 RX ORDER — PIOGLITAZONEHYDROCHLORIDE 30 MG/1
30 TABLET ORAL DAILY
Qty: 90 TABLET | Refills: 3 | Status: SHIPPED | OUTPATIENT
Start: 2021-02-23 | End: 2021-12-03 | Stop reason: SDUPTHER

## 2021-02-23 RX ORDER — EMPAGLIFLOZIN, METFORMIN HYDROCHLORIDE 12.5; 1 MG/1; MG/1
2 TABLET, EXTENDED RELEASE ORAL DAILY
Qty: 60 TABLET | Refills: 6 | Status: SHIPPED | OUTPATIENT
Start: 2021-02-23 | End: 2021-08-10 | Stop reason: SDUPTHER

## 2021-02-23 NOTE — TELEPHONE ENCOUNTER
Anuel Cassidy,    This patient would like to fill his medication at the [facility], could you please sign the orders and make it a 90 day supply?    Thanks,  Daxa

## 2021-02-24 ENCOUNTER — PHARMACY VISIT (OUTPATIENT)
Dept: PHARMACY | Facility: MEDICAL CENTER | Age: 41
End: 2021-02-24
Payer: COMMERCIAL

## 2021-02-25 ENCOUNTER — HOSPITAL ENCOUNTER (OUTPATIENT)
Dept: LAB | Facility: MEDICAL CENTER | Age: 41
End: 2021-02-25
Attending: ANESTHESIOLOGY
Payer: COMMERCIAL

## 2021-02-25 LAB
COVID ORDER STATUS COVID19: NORMAL
SARS-COV-2 RNA RESP QL NAA+PROBE: NOTDETECTED
SPECIMEN SOURCE: NORMAL

## 2021-02-25 PROCEDURE — U0005 INFEC AGEN DETEC AMPLI PROBE: HCPCS

## 2021-02-25 PROCEDURE — U0003 INFECTIOUS AGENT DETECTION BY NUCLEIC ACID (DNA OR RNA); SEVERE ACUTE RESPIRATORY SYNDROME CORONAVIRUS 2 (SARS-COV-2) (CORONAVIRUS DISEASE [COVID-19]), AMPLIFIED PROBE TECHNIQUE, MAKING USE OF HIGH THROUGHPUT TECHNOLOGIES AS DESCRIBED BY CMS-2020-01-R: HCPCS

## 2021-02-25 PROCEDURE — C9803 HOPD COVID-19 SPEC COLLECT: HCPCS

## 2021-04-14 ENCOUNTER — HOSPITAL ENCOUNTER (OUTPATIENT)
Dept: LAB | Facility: MEDICAL CENTER | Age: 41
End: 2021-04-14
Attending: ANESTHESIOLOGY
Payer: COMMERCIAL

## 2021-04-14 PROCEDURE — U0005 INFEC AGEN DETEC AMPLI PROBE: HCPCS

## 2021-04-14 PROCEDURE — U0003 INFECTIOUS AGENT DETECTION BY NUCLEIC ACID (DNA OR RNA); SEVERE ACUTE RESPIRATORY SYNDROME CORONAVIRUS 2 (SARS-COV-2) (CORONAVIRUS DISEASE [COVID-19]), AMPLIFIED PROBE TECHNIQUE, MAKING USE OF HIGH THROUGHPUT TECHNOLOGIES AS DESCRIBED BY CMS-2020-01-R: HCPCS

## 2021-04-14 PROCEDURE — C9803 HOPD COVID-19 SPEC COLLECT: HCPCS

## 2021-04-19 ENCOUNTER — PHARMACY VISIT (OUTPATIENT)
Dept: PHARMACY | Facility: MEDICAL CENTER | Age: 41
End: 2021-04-19
Payer: COMMERCIAL

## 2021-04-19 PROCEDURE — RXMED WILLOW AMBULATORY MEDICATION CHARGE: Performed by: INTERNAL MEDICINE

## 2021-05-18 PROCEDURE — RXMED WILLOW AMBULATORY MEDICATION CHARGE: Performed by: INTERNAL MEDICINE

## 2021-05-19 ENCOUNTER — PHARMACY VISIT (OUTPATIENT)
Dept: PHARMACY | Facility: MEDICAL CENTER | Age: 41
End: 2021-05-19
Payer: COMMERCIAL

## 2021-05-19 ENCOUNTER — HOSPITAL ENCOUNTER (OUTPATIENT)
Dept: LAB | Facility: MEDICAL CENTER | Age: 41
End: 2021-05-19
Attending: INTERNAL MEDICINE
Payer: COMMERCIAL

## 2021-05-19 DIAGNOSIS — E11.8 TYPE 2 DIABETES MELLITUS WITH COMPLICATION, WITHOUT LONG-TERM CURRENT USE OF INSULIN (HCC): ICD-10-CM

## 2021-05-19 DIAGNOSIS — E78.5 DYSLIPIDEMIA: ICD-10-CM

## 2021-05-19 DIAGNOSIS — Z79.84 LONG TERM (CURRENT) USE OF ORAL HYPOGLYCEMIC DRUGS: ICD-10-CM

## 2021-05-19 LAB
ALBUMIN SERPL BCP-MCNC: 4.6 G/DL (ref 3.2–4.9)
ALBUMIN/GLOB SERPL: 2.1 G/DL
ALP SERPL-CCNC: 43 U/L (ref 30–99)
ALT SERPL-CCNC: 16 U/L (ref 2–50)
ANION GAP SERPL CALC-SCNC: 11 MMOL/L (ref 7–16)
AST SERPL-CCNC: 18 U/L (ref 12–45)
BILIRUB SERPL-MCNC: 0.4 MG/DL (ref 0.1–1.5)
BUN SERPL-MCNC: 18 MG/DL (ref 8–22)
CALCIUM SERPL-MCNC: 9.2 MG/DL (ref 8.5–10.5)
CHLORIDE SERPL-SCNC: 107 MMOL/L (ref 96–112)
CHOLEST SERPL-MCNC: 165 MG/DL (ref 100–199)
CO2 SERPL-SCNC: 23 MMOL/L (ref 20–33)
CREAT SERPL-MCNC: 0.97 MG/DL (ref 0.5–1.4)
CREAT UR-MCNC: 98.73 MG/DL
EST. AVERAGE GLUCOSE BLD GHB EST-MCNC: 186 MG/DL
FASTING STATUS PATIENT QL REPORTED: NORMAL
GLOBULIN SER CALC-MCNC: 2.2 G/DL (ref 1.9–3.5)
GLUCOSE SERPL-MCNC: 123 MG/DL (ref 65–99)
HBA1C MFR BLD: 8.1 % (ref 4–5.6)
HDLC SERPL-MCNC: 61 MG/DL
LDLC SERPL CALC-MCNC: 88 MG/DL
MICROALBUMIN UR-MCNC: 2.1 MG/DL
MICROALBUMIN/CREAT UR: 21 MG/G (ref 0–30)
POTASSIUM SERPL-SCNC: 4.5 MMOL/L (ref 3.6–5.5)
PROT SERPL-MCNC: 6.8 G/DL (ref 6–8.2)
SODIUM SERPL-SCNC: 141 MMOL/L (ref 135–145)
TRIGL SERPL-MCNC: 80 MG/DL (ref 0–149)

## 2021-05-19 PROCEDURE — 80053 COMPREHEN METABOLIC PANEL: CPT

## 2021-05-19 PROCEDURE — 83036 HEMOGLOBIN GLYCOSYLATED A1C: CPT

## 2021-05-19 PROCEDURE — 82043 UR ALBUMIN QUANTITATIVE: CPT

## 2021-05-19 PROCEDURE — 36415 COLL VENOUS BLD VENIPUNCTURE: CPT

## 2021-05-19 PROCEDURE — 82570 ASSAY OF URINE CREATININE: CPT

## 2021-05-19 PROCEDURE — 80061 LIPID PANEL: CPT

## 2021-05-28 ENCOUNTER — OFFICE VISIT (OUTPATIENT)
Dept: ENDOCRINOLOGY | Facility: MEDICAL CENTER | Age: 41
End: 2021-05-28
Attending: INTERNAL MEDICINE
Payer: COMMERCIAL

## 2021-05-28 VITALS
BODY MASS INDEX: 20.94 KG/M2 | SYSTOLIC BLOOD PRESSURE: 106 MMHG | HEIGHT: 73 IN | HEART RATE: 91 BPM | WEIGHT: 158 LBS | DIASTOLIC BLOOD PRESSURE: 70 MMHG | OXYGEN SATURATION: 93 %

## 2021-05-28 DIAGNOSIS — E78.5 DYSLIPIDEMIA: ICD-10-CM

## 2021-05-28 DIAGNOSIS — E11.8 TYPE 2 DIABETES MELLITUS WITH COMPLICATION, WITHOUT LONG-TERM CURRENT USE OF INSULIN (HCC): ICD-10-CM

## 2021-05-28 DIAGNOSIS — Z79.84 LONG TERM (CURRENT) USE OF ORAL HYPOGLYCEMIC DRUGS: ICD-10-CM

## 2021-05-28 PROCEDURE — 99214 OFFICE O/P EST MOD 30 MIN: CPT | Performed by: INTERNAL MEDICINE

## 2021-05-28 PROCEDURE — 99212 OFFICE O/P EST SF 10 MIN: CPT | Performed by: INTERNAL MEDICINE

## 2021-05-28 RX ORDER — VIT C/B6/B5/MAGNESIUM/HERB 173 50-5-6-5MG
CAPSULE ORAL
COMMUNITY
End: 2022-12-06

## 2021-05-28 RX ORDER — EMPAGLIFLOZIN, METFORMIN HYDROCHLORIDE 12.5; 1 MG/1; MG/1
2 TABLET, EXTENDED RELEASE ORAL DAILY
Qty: 180 TABLET | Refills: 3 | Status: SHIPPED | OUTPATIENT
Start: 2021-05-28 | End: 2021-12-03

## 2021-05-28 RX ORDER — GLIMEPIRIDE 4 MG/1
4 TABLET ORAL
Qty: 90 TABLET | Refills: 3 | Status: SHIPPED | OUTPATIENT
Start: 2021-05-28 | End: 2021-08-31 | Stop reason: SDUPTHER

## 2021-05-28 RX ORDER — SEMAGLUTIDE 1.34 MG/ML
1 INJECTION, SOLUTION SUBCUTANEOUS
Qty: 9 ML | Refills: 3 | Status: SHIPPED | OUTPATIENT
Start: 2021-05-28 | End: 2021-06-11 | Stop reason: SDUPTHER

## 2021-05-28 NOTE — PROGRESS NOTES
"RN-CDE Note    Subjective:   Endocrinology Clinic Progress Note  PCP: Amanda Alvarenga M.D.    HPI:  Jose De Jesus Banda is a 40 y.o. old patient who is seen today for review of Type 2 Diabetes.  Has had left shoulder surgery for frozen shoulder.  Blood sugars up after shoulder while taking valium.      DM:   Last A1c:   Lab Results   Component Value Date/Time    HBA1C 8.1 (H) 05/19/2021 08:44 AM      Previous A1c was 8.1 on 5/19/21  A1C GOAL: < 7    Diabetes Medications:   Ozempic 1 mg weekly  Synjardy XR 12.5/1000 mg BID.  Actos 30 mg daily    Taking above medications as prescribed: yes  Taking daily ASA: No    Exercise: Physical therapy for shoulder  Diet: \"healthy\" diet  in general  Patient's body mass index is 20.85 kg/m². Exercise and nutrition counseling were performed at this visit.    Glucose monitoring frequency: Twice   daily  Breafast 135-150.  Dinner 130-180  Hypoglycemic episodes: no  Last Retinal Exam: Saw the doctor at [facility] in February 2021.  Daily Foot Exam: Yes   Foot Exam:  Monofilament: done  Monofilament testing with a 10 gram force: sensation intact: intact bilaterally.  He states they have hurt for years and had a cramp in his left calf.  Visual Inspection: Feet without maceration, ulcers, fissures.  Pedal pulses: intact bilaterally   Lab Results   Component Value Date/Time    MALBCRT 21 05/19/2021 08:44 AM    MICROALBUR 2.1 05/19/2021 08:44 AM      ACR Albumin/Creatinine Ratio goal <30   Currently Rx ACE/ARB: No   Dyslipidemia:  Lab Results   Component Value Date/Time    CHOLSTRLTOT 165 05/19/2021 08:44 AM    LDL 88 05/19/2021 08:44 AM    HDL 61 05/19/2021 08:44 AM    TRIGLYCERIDE 80 05/19/2021 08:44 AM       Currently Rx Statin: No     He  reports that he has never smoked. He has never used smokeless tobacco.      Plan:     Discussed and educated on:   - All medications, side effects and compliance (discussed carefully)  - Annual eye examinations at Ophthalmology  - Home glucose monitoring " emphasized  - Weight control and daily exercise    Recommended medication changes: Adjust medications as needed.

## 2021-05-28 NOTE — PROGRESS NOTES
CHIEF COMPLAINT: Patient is here for follow up of Type 2 Diabetes Mellitus.      HPI:     Jose De Jesus Banda is a 39 y.o. male with Type 2 Diabetes Mellitus here for follow up.    Labs from 5/19/2021 show a1c is 8.1%  Labs from February 16, 2021 show A1c was 8.1%  Labs from 10/14/2020 show a1c a1c was 7.4%  Labs from July 15, 2020 show A1c was 7.4%    He is a very slim gentleman and was previously seen by the PA, I did obtain some labs because he has a slim habitus and his C-peptide is detectable at 1.3 and yasmine 65 antibodies are negative ruling out BENSON and type 1 diabetes      On follow up he is now on Ozempic 1.0mg weekly, Synjardy XR 12.5/1000mg bid and Actos 30mg daily    He reports stress due to father's death from COVID  He also had some additional stress because he had surgery for motor for shoulders    He did not bring his glucose meter today      He has a history of hyperlipidemia but is not on a statin because he still very young.  His last LDL cholesterol was 68 with triglycerides of 80 and a total cholesterol of 149 on July 15, 2020      He denies a history of essential hypertension  He previously had mild microalbuminuria which has resolved on serial testing.  His urine microalbumin was normal on May 2021    His baseline TSH is normal at 1.0 on July 2020      BG Diary:  Patient did not bring glucose meter    Weight has been stable    Diabetes Complications   Retinopathy: No known retinopathy.  Last eye exam: February 2021 he reports that he had a negative eye exam with his ophthalmologist at [facility]   Neuropathy: Denies paresthesias or numbness in hands or feet. Denies any foot wounds.  Exercise: Minimal.  Diet: Fair.  Patient's medications, allergies, and social histories were reviewed and updated as appropriate.    ROS:     CONS:     No fever, no chills   EYES:     No diplopia, no blurry vision   CV:           No chest pain, no palpitations   PULM:     No SOB, no cough, no hemoptysis.   GI:             "No nausea, no vomiting, no diarrhea, no constipation   ENDO:     No polyuria, no polydipsia, no heat intolerance, no cold intolerance       Past Medical History:  Problem List:  2021-02: Chronic left shoulder pain  2021-02: Diabetic polyneuropathy (HCC)  2020-11: Dyslipidemia  2020-11: Long term (current) use of oral hypoglycemic drugs  2018-02: Encounter to establish care with new doctor  2018-02: Need for vaccination  2018-02: Type 2 diabetes mellitus with complication, without long-  term current use of insulin ([facility])  2018-02: Hypertriglyceridemia      Past Surgical History:  Past Surgical History:   Procedure Laterality Date   • PB MANIPULATN SHLDR JT W ANESTHESIA Right 8/22/2019    Procedure: MANIPULATION, SHOULDER;  Surgeon: Karon Fisher M.D.;  Location: [facility];  Service: Orthopedics   • PB SHLDR ARTHROSCOP,PART ACROMIOPLAS Right 8/22/2019    Procedure: DECOMPRESSION, SHOULDER, ARTHROSCOPIC- SUBACROMIAL;  Surgeon: Karon Fisher M.D.;  Location: [facility];  Service: Orthopedics   • SHOULDER ARTHROSCOPY Right 8/22/2019    Procedure: ARTHROSCOPY, SHOULDER- CAPSULE RELEASE;  Surgeon: Karon Fisher M.D.;  Location: SUR[facility]ERY [facility];  Service: Orthopedics   • COLONOSCOPY  01/2019        Allergies:  Patient has no known allergies.     Social History:  Social History     Tobacco Use   • Smoking status: Never Smoker   • Smokeless tobacco: Never Used   Vaping Use   • Vaping Use: Never used   Substance Use Topics   • Alcohol use: Yes     Comment: 6 per month   • Drug use: No        Family History:   family history includes Arthritis in his mother; Diabetes in his father and mother; Hypertension in his father; Lung Cancer in his maternal grandmother and paternal grandfather; Lung Disease in his father; Stroke in his mother.      PHYSICAL EXAM:   OBJECTIVE:  Vital signs: /70   Pulse 91   Ht 1.854 m (6' 1\")   Wt 71.7 kg (158 lb)   SpO2 93%   BMI 20.85 " kg/m²   GENERAL: Well-developed, well-nourished in no apparent distress.   EYE:  No ocular asymmetry, PERRLA  HENT: Pink, moist mucous membranes.    NECK: No thyromegaly.   CARDIOVASCULAR: Normal precordial impulse seen with normal carotid pulsation  LUNGS: Symmetrical chest expansion with normal phonation of voice   ABDOMEN: Obese abdomen with no visible organomegaly  EXTREMITIES: No clubbing, cyanosis, or edema.   NEUROLOGICAL: No gross focal motor abnormalities   LYMPH: No cervical adenopathy seen.   SKIN: No rashes, lesions.       Labs:  Lab Results   Component Value Date/Time    HBA1C 8.1 (H) 05/19/2021 08:44 AM        Lab Results   Component Value Date/Time    WBC 5.2 05/09/2018 07:34 AM    RBC 4.92 05/09/2018 07:34 AM    HEMOGLOBIN 15.1 05/09/2018 07:34 AM    MCV 93 05/09/2018 07:34 AM    MCH 30.7 05/09/2018 07:34 AM    MCHC 33.0 05/09/2018 07:34 AM    RDW 13.4 05/09/2018 07:34 AM       Lab Results   Component Value Date/Time    SODIUM 141 05/19/2021 08:44 AM    POTASSIUM 4.5 05/19/2021 08:44 AM    CHLORIDE 107 05/19/2021 08:44 AM    CO2 23 05/19/2021 08:44 AM    ANION 11.0 05/19/2021 08:44 AM    GLUCOSE 123 (H) 05/19/2021 08:44 AM    BUN 18 05/19/2021 08:44 AM    CREATININE 0.97 05/19/2021 08:44 AM    CALCIUM 9.2 05/19/2021 08:44 AM    ASTSGOT 18 05/19/2021 08:44 AM    ALTSGPT 16 05/19/2021 08:44 AM    TBILIRUBIN 0.4 05/19/2021 08:44 AM    ALBUMIN 4.6 05/19/2021 08:44 AM    TOTPROTEIN 6.8 05/19/2021 08:44 AM    GLOBULIN 2.2 05/19/2021 08:44 AM    AGRATIO 2.1 05/19/2021 08:44 AM       Lab Results   Component Value Date/Time    CHOLSTRLTOT 171 05/09/2018 0734    TRIGLYCERIDE 167 (H) 05/09/2018 0734    HDL 45 05/09/2018 0734    LDL 93 05/09/2018 0734       Lab Results   Component Value Date/Time    MALBCRT 21 05/19/2021 08:44 AM    MICROALBUR 2.1 05/19/2021 08:44 AM        No results found for: TSHULTRASEN  No results found for: FREEDIR  No results found for: FREET3  No results found for:  THYSTIMIG        ASSESSMENT/PLAN:     1. Type 2 diabetes mellitus with complication, without long-term current use of insulin (HCC)  Previously fairly controlled now uncontrolled  He has atypical features (slim) and I suspect that he is in the honeymoon  for type 1 diabetes  We will check glucose and C-peptide and yasmine 65 antibodies next time  Add glimepiride 4 mg with dinner  Continue Actos  Continue Synjardy  Continue Ozempic  Recommend that he get a copy of his eye exam from [facility]  Recommend follow-up in 3 months with repeat of his A1c  I want him to see our diabetes nurse in 3 months and see me again in 6 months    2. Dyslipidemia  Well-controlled  Repeat lipids in 12 months    3. Long term (current) use of oral hypoglycemic drugs  Patient is on oral agents and a GLP-1 for diabetes management       No follow-ups on file.      Thank you kindly for allowing me to participate in the diabetes care plan for this patient.    Eldon Cassidy MD, [facility], [facility]  05/11/20    CC:   Master Veras, A.P.N.

## 2021-06-07 ENCOUNTER — TELEPHONE (OUTPATIENT)
Dept: ENDOCRINOLOGY | Facility: MEDICAL CENTER | Age: 41
End: 2021-06-07

## 2021-06-11 DIAGNOSIS — E11.8 TYPE 2 DIABETES MELLITUS WITH COMPLICATION, WITHOUT LONG-TERM CURRENT USE OF INSULIN (HCC): ICD-10-CM

## 2021-06-11 RX ORDER — SEMAGLUTIDE 1.34 MG/ML
1 INJECTION, SOLUTION SUBCUTANEOUS
Qty: 9 ML | Refills: 3 | Status: SHIPPED | OUTPATIENT
Start: 2021-06-11 | End: 2022-04-15 | Stop reason: SDUPTHER

## 2021-06-14 ENCOUNTER — PHARMACY VISIT (OUTPATIENT)
Dept: PHARMACY | Facility: MEDICAL CENTER | Age: 41
End: 2021-06-14
Payer: COMMERCIAL

## 2021-06-14 PROCEDURE — RXMED WILLOW AMBULATORY MEDICATION CHARGE: Performed by: INTERNAL MEDICINE

## 2021-08-09 ENCOUNTER — PHARMACY VISIT (OUTPATIENT)
Dept: PHARMACY | Facility: MEDICAL CENTER | Age: 41
End: 2021-08-09
Payer: COMMERCIAL

## 2021-08-09 PROCEDURE — RXMED WILLOW AMBULATORY MEDICATION CHARGE: Performed by: INTERNAL MEDICINE

## 2021-08-10 DIAGNOSIS — E11.8 TYPE 2 DIABETES MELLITUS WITH COMPLICATION, WITHOUT LONG-TERM CURRENT USE OF INSULIN (HCC): ICD-10-CM

## 2021-08-10 RX ORDER — EMPAGLIFLOZIN, METFORMIN HYDROCHLORIDE 12.5; 1 MG/1; MG/1
2 TABLET, EXTENDED RELEASE ORAL DAILY
Qty: 60 TABLET | Refills: 6 | Status: SHIPPED | OUTPATIENT
Start: 2021-08-10 | End: 2021-08-31

## 2021-08-16 PROCEDURE — RXMED WILLOW AMBULATORY MEDICATION CHARGE: Performed by: INTERNAL MEDICINE

## 2021-08-17 ENCOUNTER — PHARMACY VISIT (OUTPATIENT)
Dept: PHARMACY | Facility: MEDICAL CENTER | Age: 41
End: 2021-08-17
Payer: COMMERCIAL

## 2021-08-20 ENCOUNTER — HOSPITAL ENCOUNTER (OUTPATIENT)
Dept: LAB | Facility: MEDICAL CENTER | Age: 41
End: 2021-08-20
Attending: INTERNAL MEDICINE
Payer: COMMERCIAL

## 2021-08-20 DIAGNOSIS — E11.8 TYPE 2 DIABETES MELLITUS WITH COMPLICATION, WITHOUT LONG-TERM CURRENT USE OF INSULIN (HCC): ICD-10-CM

## 2021-08-20 DIAGNOSIS — E78.5 DYSLIPIDEMIA: ICD-10-CM

## 2021-08-20 DIAGNOSIS — Z79.84 LONG TERM (CURRENT) USE OF ORAL HYPOGLYCEMIC DRUGS: ICD-10-CM

## 2021-08-20 LAB
ALBUMIN SERPL BCP-MCNC: 4.5 G/DL (ref 3.2–4.9)
ALBUMIN/GLOB SERPL: 2 G/DL
ALP SERPL-CCNC: 42 U/L (ref 30–99)
ALT SERPL-CCNC: 15 U/L (ref 2–50)
ANION GAP SERPL CALC-SCNC: 11 MMOL/L (ref 7–16)
AST SERPL-CCNC: 14 U/L (ref 12–45)
BASOPHILS # BLD AUTO: 0.4 % (ref 0–1.8)
BASOPHILS # BLD: 0.02 K/UL (ref 0–0.12)
BILIRUB SERPL-MCNC: 0.4 MG/DL (ref 0.1–1.5)
BUN SERPL-MCNC: 19 MG/DL (ref 8–22)
CALCIUM SERPL-MCNC: 9.3 MG/DL (ref 8.5–10.5)
CHLORIDE SERPL-SCNC: 104 MMOL/L (ref 96–112)
CO2 SERPL-SCNC: 25 MMOL/L (ref 20–33)
CREAT SERPL-MCNC: 1 MG/DL (ref 0.5–1.4)
EOSINOPHIL # BLD AUTO: 0.13 K/UL (ref 0–0.51)
EOSINOPHIL NFR BLD: 2.7 % (ref 0–6.9)
ERYTHROCYTE [DISTWIDTH] IN BLOOD BY AUTOMATED COUNT: 43.1 FL (ref 35.9–50)
FASTING STATUS PATIENT QL REPORTED: NORMAL
GLOBULIN SER CALC-MCNC: 2.3 G/DL (ref 1.9–3.5)
GLUCOSE SERPL-MCNC: 131 MG/DL (ref 65–99)
HCT VFR BLD AUTO: 46.2 % (ref 42–52)
HGB BLD-MCNC: 15.3 G/DL (ref 14–18)
IMM GRANULOCYTES # BLD AUTO: 0.01 K/UL (ref 0–0.11)
IMM GRANULOCYTES NFR BLD AUTO: 0.2 % (ref 0–0.9)
LYMPHOCYTES # BLD AUTO: 2.09 K/UL (ref 1–4.8)
LYMPHOCYTES NFR BLD: 43.8 % (ref 22–41)
MCH RBC QN AUTO: 30.8 PG (ref 27–33)
MCHC RBC AUTO-ENTMCNC: 33.1 G/DL (ref 33.7–35.3)
MCV RBC AUTO: 93 FL (ref 81.4–97.8)
MONOCYTES # BLD AUTO: 0.38 K/UL (ref 0–0.85)
MONOCYTES NFR BLD AUTO: 8 % (ref 0–13.4)
NEUTROPHILS # BLD AUTO: 2.14 K/UL (ref 1.82–7.42)
NEUTROPHILS NFR BLD: 44.9 % (ref 44–72)
NRBC # BLD AUTO: 0 K/UL
NRBC BLD-RTO: 0 /100 WBC
PLATELET # BLD AUTO: 197 K/UL (ref 164–446)
PMV BLD AUTO: 10.6 FL (ref 9–12.9)
POTASSIUM SERPL-SCNC: 4.8 MMOL/L (ref 3.6–5.5)
PROT SERPL-MCNC: 6.8 G/DL (ref 6–8.2)
RBC # BLD AUTO: 4.97 M/UL (ref 4.7–6.1)
SODIUM SERPL-SCNC: 140 MMOL/L (ref 135–145)
WBC # BLD AUTO: 4.8 K/UL (ref 4.8–10.8)

## 2021-08-20 PROCEDURE — 85025 COMPLETE CBC W/AUTO DIFF WBC: CPT

## 2021-08-20 PROCEDURE — 80053 COMPREHEN METABOLIC PANEL: CPT

## 2021-08-20 PROCEDURE — 36415 COLL VENOUS BLD VENIPUNCTURE: CPT

## 2021-08-20 PROCEDURE — 84681 ASSAY OF C-PEPTIDE: CPT

## 2021-08-20 PROCEDURE — 86341 ISLET CELL ANTIBODY: CPT

## 2021-08-24 LAB — C PEPTIDE SERPL-MCNC: 0.9 NG/ML (ref 0.5–3.3)

## 2021-08-25 LAB — GAD65 AB SER IA-ACNC: <5 IU/ML (ref 0–5)

## 2021-08-31 ENCOUNTER — NON-PROVIDER VISIT (OUTPATIENT)
Dept: ENDOCRINOLOGY | Facility: MEDICAL CENTER | Age: 41
End: 2021-08-31
Attending: INTERNAL MEDICINE
Payer: COMMERCIAL

## 2021-08-31 VITALS — BODY MASS INDEX: 21.2 KG/M2 | OXYGEN SATURATION: 98 % | HEART RATE: 88 BPM | HEIGHT: 73 IN | WEIGHT: 160 LBS

## 2021-08-31 DIAGNOSIS — E11.8 TYPE 2 DIABETES MELLITUS WITH COMPLICATION, WITHOUT LONG-TERM CURRENT USE OF INSULIN (HCC): ICD-10-CM

## 2021-08-31 LAB
HBA1C MFR BLD: 7.5 % (ref 0–5.6)
INT CON NEG: ABNORMAL
INT CON POS: ABNORMAL

## 2021-08-31 PROCEDURE — 99211 OFF/OP EST MAY X REQ PHY/QHP: CPT | Performed by: INTERNAL MEDICINE

## 2021-08-31 PROCEDURE — 83036 HEMOGLOBIN GLYCOSYLATED A1C: CPT

## 2021-08-31 RX ORDER — SEMAGLUTIDE 1.34 MG/ML
INJECTION, SOLUTION SUBCUTANEOUS
COMMUNITY
Start: 2021-06-14 | End: 2021-08-31

## 2021-08-31 RX ORDER — GLIMEPIRIDE 4 MG/1
4 TABLET ORAL
Qty: 90 TABLET | Refills: 3 | Status: SHIPPED | OUTPATIENT
Start: 2021-08-31 | End: 2022-06-03 | Stop reason: SDUPTHER

## 2021-08-31 ASSESSMENT — FIBROSIS 4 INDEX: FIB4 SCORE: 0.7

## 2021-08-31 NOTE — PROGRESS NOTES
"RN-CDE Note    Subjective:   Endocrinology Clinic Progress Note  PCP: Amanda Alvarenga M.D.    HPI:  Jose De Jesus Banda is a 40 y.o. old patient who is seen today for review of Type 2 diabetes.  Recent changes in health: Under a lot of stress with father passing away and dogs and cat with health issues.  He could not get his Glimepiride due to needing a prior authorization.  DM:   Last A1c:   Lab Results   Component Value Date/Time    HBA1C 7.5 (A) 08/31/2021 09:59 AM      Previous A1c was 8.1 on 5/19/21  A1C GOAL: < 7    Diabetes Medications: Actos 30 mg daily  Synjardy XR 12.5-1000 mg  Daily  Actos 30 mg daily  Ozempic 1 mg weekly    Exercise: Having physical therapy on his shoulders and so his activity is limited.  Decrease hiking with smoke.  Diet: \"healthy\" diet  in general  Patient's body mass index is 21.11 kg/m². Exercise and nutrition counseling were performed at this visit.    Glucose monitoring frequency: Testing blood sugars twice daily  100-200  Hypoglycemic episodes: no  Last Retinal Exam: January 2021 [facility] in [place]  Daily Foot Exam: Yes   Foot Exam:  Monofilament: done  Monofilament testing with a 10 gram force: sensation intact: intact bilaterally  Visual Inspection: Feet without maceration, ulcers, fissures.  Pedal pulses: intact bilaterally   Lab Results   Component Value Date/Time    MALBCRT 21 05/19/2021 08:44 AM    MICROALBUR 2.1 05/19/2021 08:44 AM        He  reports that he has never smoked. He has never used smokeless tobacco.      Plan:     Discussed and educated on:   - All medications, side effects and compliance (discussed carefully)  - Annual eye examinations at Ophthalmology  - Home glucose monitoring emphasized  - Weight control and daily exercise    Recommended medication changes: He will start the Glimepiride 4 mg daily.  He is going to get his medications filled through [facility] on [address].    "

## 2021-09-27 PROCEDURE — RXMED WILLOW AMBULATORY MEDICATION CHARGE: Performed by: INTERNAL MEDICINE

## 2021-09-28 ENCOUNTER — PHARMACY VISIT (OUTPATIENT)
Dept: PHARMACY | Facility: MEDICAL CENTER | Age: 41
End: 2021-09-28
Payer: COMMERCIAL

## 2021-11-11 DIAGNOSIS — E11.8 TYPE 2 DIABETES MELLITUS WITH COMPLICATION, WITHOUT LONG-TERM CURRENT USE OF INSULIN (HCC): ICD-10-CM

## 2021-11-11 PROCEDURE — RXMED WILLOW AMBULATORY MEDICATION CHARGE: Performed by: INTERNAL MEDICINE

## 2021-11-11 RX ORDER — EMPAGLIFLOZIN, METFORMIN HYDROCHLORIDE 12.5; 1 MG/1; MG/1
2 TABLET, EXTENDED RELEASE ORAL DAILY
Qty: 60 TABLET | Refills: 6 | Status: SHIPPED | OUTPATIENT
Start: 2021-11-11 | End: 2021-12-03 | Stop reason: SDUPTHER

## 2021-11-12 ENCOUNTER — PHARMACY VISIT (OUTPATIENT)
Dept: PHARMACY | Facility: MEDICAL CENTER | Age: 41
End: 2021-11-12
Payer: COMMERCIAL

## 2021-11-12 PROCEDURE — RXMED WILLOW AMBULATORY MEDICATION CHARGE: Performed by: INTERNAL MEDICINE

## 2021-12-03 ENCOUNTER — OFFICE VISIT (OUTPATIENT)
Dept: ENDOCRINOLOGY | Facility: MEDICAL CENTER | Age: 41
End: 2021-12-03
Attending: INTERNAL MEDICINE
Payer: COMMERCIAL

## 2021-12-03 VITALS
BODY MASS INDEX: 22.53 KG/M2 | WEIGHT: 170 LBS | HEIGHT: 73 IN | OXYGEN SATURATION: 99 % | HEART RATE: 72 BPM | DIASTOLIC BLOOD PRESSURE: 60 MMHG | SYSTOLIC BLOOD PRESSURE: 100 MMHG

## 2021-12-03 DIAGNOSIS — E11.8 TYPE 2 DIABETES MELLITUS WITH COMPLICATION, WITHOUT LONG-TERM CURRENT USE OF INSULIN (HCC): ICD-10-CM

## 2021-12-03 DIAGNOSIS — E11.9 CONTROLLED TYPE 2 DIABETES MELLITUS WITHOUT COMPLICATION, WITHOUT LONG-TERM CURRENT USE OF INSULIN (HCC): ICD-10-CM

## 2021-12-03 DIAGNOSIS — E78.5 DYSLIPIDEMIA: ICD-10-CM

## 2021-12-03 DIAGNOSIS — Z79.84 LONG TERM (CURRENT) USE OF ORAL HYPOGLYCEMIC DRUGS: ICD-10-CM

## 2021-12-03 DIAGNOSIS — L74.52 FOCAL HYPERHIDROSIS DUE TO FREY SYNDROME: ICD-10-CM

## 2021-12-03 LAB
HBA1C MFR BLD: 6.6 % (ref 0–5.6)
INT CON NEG: ABNORMAL
INT CON POS: ABNORMAL

## 2021-12-03 PROCEDURE — RXMED WILLOW AMBULATORY MEDICATION CHARGE: Performed by: INTERNAL MEDICINE

## 2021-12-03 PROCEDURE — 99214 OFFICE O/P EST MOD 30 MIN: CPT | Performed by: INTERNAL MEDICINE

## 2021-12-03 PROCEDURE — 99212 OFFICE O/P EST SF 10 MIN: CPT | Performed by: INTERNAL MEDICINE

## 2021-12-03 PROCEDURE — 83036 HEMOGLOBIN GLYCOSYLATED A1C: CPT | Performed by: INTERNAL MEDICINE

## 2021-12-03 RX ORDER — GLYCOPYRROLATE 1 MG/1
1 TABLET ORAL 3 TIMES DAILY
Qty: 90 TABLET | Refills: 5 | Status: SHIPPED | OUTPATIENT
Start: 2021-12-03 | End: 2021-12-03 | Stop reason: SDUPTHER

## 2021-12-03 RX ORDER — PIOGLITAZONEHYDROCHLORIDE 30 MG/1
30 TABLET ORAL DAILY
Qty: 90 TABLET | Refills: 3 | Status: SHIPPED | OUTPATIENT
Start: 2021-12-03 | End: 2022-04-15

## 2021-12-03 RX ORDER — EMPAGLIFLOZIN, METFORMIN HYDROCHLORIDE 12.5; 1 MG/1; MG/1
2 TABLET, EXTENDED RELEASE ORAL DAILY
Qty: 180 TABLET | Refills: 3 | Status: SHIPPED | OUTPATIENT
Start: 2021-12-03 | End: 2022-04-15 | Stop reason: SDUPTHER

## 2021-12-03 RX ORDER — GLYCOPYRROLATE 1 MG/1
1 TABLET ORAL 3 TIMES DAILY
Qty: 90 TABLET | Refills: 5 | Status: SHIPPED | OUTPATIENT
Start: 2021-12-03 | End: 2022-05-17

## 2021-12-03 RX ORDER — EMPAGLIFLOZIN, METFORMIN HYDROCHLORIDE 12.5; 1 MG/1; MG/1
2 TABLET, EXTENDED RELEASE ORAL DAILY
Qty: 180 TABLET | Refills: 3 | Status: SHIPPED | OUTPATIENT
Start: 2021-12-03 | End: 2021-12-03 | Stop reason: SDUPTHER

## 2021-12-03 ASSESSMENT — FIBROSIS 4 INDEX: FIB4 SCORE: 0.7

## 2021-12-03 NOTE — PROGRESS NOTES
"RN-CDE Note    Subjective:   Endocrinology Clinic Progress Note  PCP: Amanda Alvarenga M.D.    HPI:  Jose De Jesus Banda is a 40 y.o. old patient who is seen today for review of Type 1 Diabetes.  Recent changes in health: States sweats when eats.  DM:   Last A1c:   Lab Results   Component Value Date/Time    HBA1C 6.6 (A) 2021 09:42 AM      Previous A1c was 7.5 on 21  A1C GOAL: < 7    Diabetes Medications:   Glimepiride 4 mg daily  Synjardy XR 12.5-1000 mg BID  Actos 30 mg daily  Ozempic 1 mg weekly       Exercise: Golfing and walking dog  Diet: \"healthy\" diet  in general  Patient's body mass index is 22.43 kg/m². Exercise and nutrition counseling were performed at this visit.    Glucose monitoring frequency: Testing twice daily  Fastin-114 and Dinner:   Hypoglycemic episodes: no  Last Retinal Exam: 2021 at [facility]  Daily Foot Exam: Yes   Foot Exam:  Monofilament: done  Monofilament testing with a 10 gram force: sensation intact: intact bilaterally  Visual Inspection: Feet without maceration, ulcers, fissures.  Pedal pulses: intact bilaterally   Lab Results   Component Value Date/Time    MALBCRT 21 2021 08:44 AM    MICROALBUR 2.1 2021 08:44 AM     He  reports that he has never smoked. He has never used smokeless tobacco.      Plan:     Discussed and educated on:   - All medications, side effects and compliance (discussed carefully)  - Annual eye examinations at Ophthalmology  - Home glucose monitoring emphasized  - Weight control and daily exercise    Recommended medication changes: No changes.   "

## 2021-12-03 NOTE — PROGRESS NOTES
CHIEF COMPLAINT: Patient is here for follow up of Type 2 Diabetes Mellitus.      HPI:     Jose De Jesus Banda is a 39 y.o. male with Type 2 Diabetes Mellitus here for follow up.    Labs from December 3, 2021 show A1c is 6.6%  Labs from 5/19/2021 show a1c was 8.1%  Labs from February 16, 2021 show A1c was 8.1%  Labs from 10/14/2020 show a1c a1c was 7.4%  Labs from July 15, 2020 show A1c was 7.4%    He is a very slim gentleman and was previously seen by the PA, I did obtain some labs because he has a slim habitus and his C-peptide is detectable at 1.3 and yasmine 65 antibodies are negative ruling out BENSON and type 1 diabetes      On follow up he is now on Ozempic 1.0mg weekly, Synjardy XR 12.5/1000mg bid and Actos 30mg daily, and Glimepiride 4 mg daily      His sugars are much improved after I started him on glimepiride in addition to his other medications  He is very happy that his A1c is better    He is complaining of gustatory sweating which has been a chronic condition for some time now and he sweats on his forehead, neck and face whenever he eats cheese or peels and orange    He denies any history of Bell's palsy or facial nerve injury        He has a history of hyperlipidemia but is not on a statin because he still very young.  His LDL cholesterol was 88 on May 2021        He denies a history of essential hypertension  He does not have albuminuria anymore  U ACR was less than 30 on May 2021          BG Diary:  Patient brought his blood sugar logs fasting sugars are      Weight has been stable    Diabetes Complications   Retinopathy: No known retinopathy.  Last eye exam: February 2021 he reports that he had a negative eye exam with his ophthalmologist at [facility]   Neuropathy: Denies paresthesias or numbness in hands or feet. Denies any foot wounds.  Exercise: Minimal.  Diet: Fair.  Patient's medications, allergies, and social histories were reviewed and updated as appropriate.    ROS:     CONS:     No fever, no  chills   EYES:     No diplopia, no blurry vision   CV:           No chest pain, no palpitations   PULM:     No SOB, no cough, no hemoptysis.   GI:            No nausea, no vomiting, no diarrhea, no constipation   ENDO:     No polyuria, no polydipsia, no heat intolerance, no cold intolerance       Past Medical History:  Problem List:  2021-12: Focal hyperhidrosis due to Any syndrome  2021-02: Chronic left shoulder pain  2021-02: Diabetic polyneuropathy (HCC)  2020-11: Dyslipidemia  2020-11: Long term (current) use of oral hypoglycemic drugs  2018-02: Encounter to establish care with new doctor  2018-02: Need for vaccination  2018-02: Type 2 diabetes mellitus with complication, without long-  term current use of insulin ([facility])  2018-02: Hypertriglyceridemia      Past Surgical History:  Past Surgical History:   Procedure Laterality Date   • PB MANIPULATN SHLDR JT W ANESTHESIA Right 8/22/2019    Procedure: MANIPULATION, SHOULDER;  Surgeon: Karon Fisher M.D.;  Location: SURGERY [facility];  Service: Orthopedics   • PB SHLDR ARTHROSCOP,PART ACROMIOPLAS Right 8/22/2019    Procedure: DECOMPRESSION, SHOULDER, ARTHROSCOPIC- SUBACROMIAL;  Surgeon: Karon Fisher M.D.;  Location: [facility];  Service: Orthopedics   • SHOULDER ARTHROSCOPY Right 8/22/2019    Procedure: ARTHROSCOPY, SHOULDER- CAPSULE RELEASE;  Surgeon: Karon Fisher M.D.;  Location: [facility];  Service: Orthopedics   • COLONOSCOPY  01/2019        Allergies:  Patient has no known allergies.     Social History:  Social History     Tobacco Use   • Smoking status: Never Smoker   • Smokeless tobacco: Never Used   Vaping Use   • Vaping Use: Never used   Substance Use Topics   • Alcohol use: Yes     Comment: 6 per month   • Drug use: No        Family History:   family history includes Arthritis in his mother; Diabetes in his father and mother; Hypertension in his father; Lung Cancer in his maternal grandmother and  "paternal grandfather; Lung Disease in his father; Stroke in his mother.      PHYSICAL EXAM:   OBJECTIVE:  Vital signs: /60   Pulse 72   Ht 1.854 m (6' 1\")   Wt 77.1 kg (170 lb)   SpO2 99%   BMI 22.43 kg/m²   GENERAL: Well-developed, well-nourished in no apparent distress.   EYE:  No ocular asymmetry, PERRLA  HENT: Pink, moist mucous membranes.    NECK: No thyromegaly.   CARDIOVASCULAR: Normal precordial impulse seen with normal carotid pulsation  LUNGS: Symmetrical chest expansion with normal phonation of voice   ABDOMEN: Obese abdomen with no visible organomegaly  EXTREMITIES: No clubbing, cyanosis, or edema.   NEUROLOGICAL: No gross focal motor abnormalities   LYMPH: No cervical adenopathy seen.   SKIN: No rashes, lesions.       Labs:  Lab Results   Component Value Date/Time    HBA1C 6.6 (A) 12/03/2021 09:42 AM        Lab Results   Component Value Date/Time    WBC 4.8 08/20/2021 08:08 AM    RBC 4.97 08/20/2021 08:08 AM    HEMOGLOBIN 15.3 08/20/2021 08:08 AM    MCV 93.0 08/20/2021 08:08 AM    MCH 30.8 08/20/2021 08:08 AM    MCHC 33.1 (L) 08/20/2021 08:08 AM    RDW 43.1 08/20/2021 08:08 AM    MPV 10.6 08/20/2021 08:08 AM       Lab Results   Component Value Date/Time    SODIUM 140 08/20/2021 08:08 AM    POTASSIUM 4.8 08/20/2021 08:08 AM    CHLORIDE 104 08/20/2021 08:08 AM    CO2 25 08/20/2021 08:08 AM    ANION 11.0 08/20/2021 08:08 AM    GLUCOSE 131 (H) 08/20/2021 08:08 AM    BUN 19 08/20/2021 08:08 AM    CREATININE 1.00 08/20/2021 08:08 AM    CALCIUM 9.3 08/20/2021 08:08 AM    ASTSGOT 14 08/20/2021 08:08 AM    ALTSGPT 15 08/20/2021 08:08 AM    TBILIRUBIN 0.4 08/20/2021 08:08 AM    ALBUMIN 4.5 08/20/2021 08:08 AM    TOTPROTEIN 6.8 08/20/2021 08:08 AM    GLOBULIN 2.3 08/20/2021 08:08 AM    AGRATIO 2.0 08/20/2021 08:08 AM       Lab Results   Component Value Date/Time    CHOLSTRLTOT 171 05/09/2018 0734    TRIGLYCERIDE 167 (H) 05/09/2018 0734    HDL 45 05/09/2018 0734    LDL 93 05/09/2018 0734       Lab " Results   Component Value Date/Time    [facility]    MICROALBUR 2.1 05/19/2021 08:44 AM        No results found for: TSHULTRASEN  No results found for: FREEDIR  No results found for: FREET3  No results found for: THYSTIMIG        ASSESSMENT/PLAN:     1. Controlled type 2 diabetes mellitus without complication, without long-term current use of insulin (HCC)  Controlled  Continue Synjardy, Ozempic, Actos and glimepiride  He is up-to-date with his labs  Recommend follow-up in 6 months with repeat of A1c start    2. Dyslipidemia  Controlled  Continue observation and low-fat diet  Repeat fasting lipids in 6 months    3. Focal hyperhidrosis due to Any syndrome  Unstable  this could be secondary to diabetic autonomic neuropathy  Recommend that he try topical glycopyrrolate  For some reason I cannot find topical glycopyrrolate in our system so I am ordering oral glycopyrrolate but I will asked help from [facility] to change it to topical    4. Long term (current) use of oral hypoglycemic drugs  Patient is on multiple oral agents for type 2 diabetes management      Return in about 6 months (around 6/3/2022).      Thank you kindly for allowing me to participate in the diabetes care plan for this patient.    Eldon Cassidy MD, FACE, [facility]  05/11/20    CC:   JOEL Zelaya.

## 2021-12-07 ENCOUNTER — PHARMACY VISIT (OUTPATIENT)
Dept: PHARMACY | Facility: MEDICAL CENTER | Age: 41
End: 2021-12-07
Payer: COMMERCIAL

## 2021-12-10 ENCOUNTER — PHARMACY VISIT (OUTPATIENT)
Dept: PHARMACY | Facility: MEDICAL CENTER | Age: 41
End: 2021-12-10
Payer: COMMERCIAL

## 2021-12-10 PROCEDURE — RXMED WILLOW AMBULATORY MEDICATION CHARGE: Performed by: INTERNAL MEDICINE

## 2021-12-20 ENCOUNTER — PHARMACY VISIT (OUTPATIENT)
Dept: PHARMACY | Facility: MEDICAL CENTER | Age: 41
End: 2021-12-20
Payer: COMMERCIAL

## 2021-12-20 PROCEDURE — RXMED WILLOW AMBULATORY MEDICATION CHARGE: Performed by: INTERNAL MEDICINE

## 2022-01-07 PROCEDURE — RXMED WILLOW AMBULATORY MEDICATION CHARGE: Performed by: INTERNAL MEDICINE

## 2022-01-10 ENCOUNTER — PHARMACY VISIT (OUTPATIENT)
Dept: PHARMACY | Facility: MEDICAL CENTER | Age: 42
End: 2022-01-10
Payer: COMMERCIAL

## 2022-02-10 ENCOUNTER — PHARMACY VISIT (OUTPATIENT)
Dept: PHARMACY | Facility: MEDICAL CENTER | Age: 42
End: 2022-02-10
Payer: COMMERCIAL

## 2022-02-10 PROCEDURE — RXMED WILLOW AMBULATORY MEDICATION CHARGE: Performed by: INTERNAL MEDICINE

## 2022-03-08 ENCOUNTER — PHARMACY VISIT (OUTPATIENT)
Dept: PHARMACY | Facility: MEDICAL CENTER | Age: 42
End: 2022-03-08
Payer: COMMERCIAL

## 2022-03-08 PROCEDURE — RXMED WILLOW AMBULATORY MEDICATION CHARGE: Performed by: INTERNAL MEDICINE

## 2022-03-17 PROCEDURE — RXMED WILLOW AMBULATORY MEDICATION CHARGE: Performed by: INTERNAL MEDICINE

## 2022-03-18 ENCOUNTER — PHARMACY VISIT (OUTPATIENT)
Dept: PHARMACY | Facility: MEDICAL CENTER | Age: 42
End: 2022-03-18
Payer: COMMERCIAL

## 2022-04-15 ENCOUNTER — PATIENT MESSAGE (OUTPATIENT)
Dept: ENDOCRINOLOGY | Facility: MEDICAL CENTER | Age: 42
End: 2022-04-15
Payer: COMMERCIAL

## 2022-04-15 DIAGNOSIS — E11.8 TYPE 2 DIABETES MELLITUS WITH COMPLICATION, WITHOUT LONG-TERM CURRENT USE OF INSULIN (HCC): ICD-10-CM

## 2022-04-15 DIAGNOSIS — E11.9 CONTROLLED TYPE 2 DIABETES MELLITUS WITHOUT COMPLICATION, WITHOUT LONG-TERM CURRENT USE OF INSULIN (HCC): ICD-10-CM

## 2022-04-15 RX ORDER — EMPAGLIFLOZIN, METFORMIN HYDROCHLORIDE 12.5; 1 MG/1; MG/1
2 TABLET, EXTENDED RELEASE ORAL DAILY
Qty: 180 TABLET | Refills: 3 | Status: SHIPPED | OUTPATIENT
Start: 2022-04-15 | End: 2022-12-06 | Stop reason: SDUPTHER

## 2022-04-15 RX ORDER — PIOGLITAZONEHYDROCHLORIDE 30 MG/1
30 TABLET ORAL DAILY
Qty: 90 TABLET | Refills: 3 | Status: SHIPPED | OUTPATIENT
Start: 2022-04-15 | End: 2022-12-06 | Stop reason: SDUPTHER

## 2022-04-15 RX ORDER — PIOGLITAZONEHYDROCHLORIDE 30 MG/1
30 TABLET ORAL DAILY
Qty: 90 TABLET | Refills: 3 | Status: SHIPPED | OUTPATIENT
Start: 2022-04-15 | End: 2022-06-03

## 2022-04-15 RX ORDER — SEMAGLUTIDE 1.34 MG/ML
1 INJECTION, SOLUTION SUBCUTANEOUS
Qty: 9 ML | Refills: 3 | Status: SHIPPED | OUTPATIENT
Start: 2022-04-15 | End: 2022-12-06 | Stop reason: SDUPTHER

## 2022-05-17 DIAGNOSIS — L74.52 FOCAL HYPERHIDROSIS DUE TO FREY SYNDROME: ICD-10-CM

## 2022-05-17 RX ORDER — GLYCOPYRROLATE 1 MG/1
1 TABLET ORAL 3 TIMES DAILY
Qty: 90 TABLET | Refills: 5 | Status: SHIPPED | OUTPATIENT
Start: 2022-05-17 | End: 2022-11-28

## 2022-05-24 ENCOUNTER — HOSPITAL ENCOUNTER (OUTPATIENT)
Dept: LAB | Facility: MEDICAL CENTER | Age: 42
End: 2022-05-24
Attending: INTERNAL MEDICINE
Payer: COMMERCIAL

## 2022-05-24 DIAGNOSIS — E11.9 CONTROLLED TYPE 2 DIABETES MELLITUS WITHOUT COMPLICATION, WITHOUT LONG-TERM CURRENT USE OF INSULIN (HCC): ICD-10-CM

## 2022-05-24 DIAGNOSIS — E78.5 DYSLIPIDEMIA: ICD-10-CM

## 2022-05-24 DIAGNOSIS — Z79.84 LONG TERM (CURRENT) USE OF ORAL HYPOGLYCEMIC DRUGS: ICD-10-CM

## 2022-05-24 DIAGNOSIS — L74.52 FOCAL HYPERHIDROSIS DUE TO FREY SYNDROME: ICD-10-CM

## 2022-05-24 LAB
ALBUMIN SERPL BCP-MCNC: 4.7 G/DL (ref 3.2–4.9)
ALBUMIN/GLOB SERPL: 2.4 G/DL
ALP SERPL-CCNC: 42 U/L (ref 30–99)
ALT SERPL-CCNC: 19 U/L (ref 2–50)
ANION GAP SERPL CALC-SCNC: 10 MMOL/L (ref 7–16)
AST SERPL-CCNC: 20 U/L (ref 12–45)
BASOPHILS # BLD AUTO: 0.7 % (ref 0–1.8)
BASOPHILS # BLD: 0.03 K/UL (ref 0–0.12)
BILIRUB SERPL-MCNC: 0.3 MG/DL (ref 0.1–1.5)
BUN SERPL-MCNC: 23 MG/DL (ref 8–22)
CALCIUM SERPL-MCNC: 9.3 MG/DL (ref 8.5–10.5)
CHLORIDE SERPL-SCNC: 105 MMOL/L (ref 96–112)
CHOLEST SERPL-MCNC: 179 MG/DL (ref 100–199)
CO2 SERPL-SCNC: 25 MMOL/L (ref 20–33)
CREAT SERPL-MCNC: 1.03 MG/DL (ref 0.5–1.4)
CREAT UR-MCNC: 89.54 MG/DL
EOSINOPHIL # BLD AUTO: 0.17 K/UL (ref 0–0.51)
EOSINOPHIL NFR BLD: 3.7 % (ref 0–6.9)
ERYTHROCYTE [DISTWIDTH] IN BLOOD BY AUTOMATED COUNT: 43.8 FL (ref 35.9–50)
FASTING STATUS PATIENT QL REPORTED: NORMAL
GFR SERPLBLD CREATININE-BSD FMLA CKD-EPI: 94 ML/MIN/1.73 M 2
GLOBULIN SER CALC-MCNC: 2 G/DL (ref 1.9–3.5)
GLUCOSE SERPL-MCNC: 138 MG/DL (ref 65–99)
HCT VFR BLD AUTO: 45.5 % (ref 42–52)
HDLC SERPL-MCNC: 49 MG/DL
HGB BLD-MCNC: 15.2 G/DL (ref 14–18)
IMM GRANULOCYTES # BLD AUTO: 0.01 K/UL (ref 0–0.11)
IMM GRANULOCYTES NFR BLD AUTO: 0.2 % (ref 0–0.9)
LDLC SERPL CALC-MCNC: 108 MG/DL
LYMPHOCYTES # BLD AUTO: 1.61 K/UL (ref 1–4.8)
LYMPHOCYTES NFR BLD: 35 % (ref 22–41)
MCH RBC QN AUTO: 31 PG (ref 27–33)
MCHC RBC AUTO-ENTMCNC: 33.4 G/DL (ref 33.7–35.3)
MCV RBC AUTO: 92.9 FL (ref 81.4–97.8)
MICROALBUMIN UR-MCNC: 2.2 MG/DL
MICROALBUMIN/CREAT UR: 25 MG/G (ref 0–30)
MONOCYTES # BLD AUTO: 0.4 K/UL (ref 0–0.85)
MONOCYTES NFR BLD AUTO: 8.7 % (ref 0–13.4)
NEUTROPHILS # BLD AUTO: 2.38 K/UL (ref 1.82–7.42)
NEUTROPHILS NFR BLD: 51.7 % (ref 44–72)
NRBC # BLD AUTO: 0 K/UL
NRBC BLD-RTO: 0 /100 WBC
PLATELET # BLD AUTO: 216 K/UL (ref 164–446)
PMV BLD AUTO: 10.2 FL (ref 9–12.9)
POTASSIUM SERPL-SCNC: 5.3 MMOL/L (ref 3.6–5.5)
PROT SERPL-MCNC: 6.7 G/DL (ref 6–8.2)
RBC # BLD AUTO: 4.9 M/UL (ref 4.7–6.1)
SODIUM SERPL-SCNC: 140 MMOL/L (ref 135–145)
T4 FREE SERPL-MCNC: 1.31 NG/DL (ref 0.93–1.7)
TRIGL SERPL-MCNC: 110 MG/DL (ref 0–149)
TSH SERPL DL<=0.005 MIU/L-ACNC: 1.87 UIU/ML (ref 0.38–5.33)
WBC # BLD AUTO: 4.6 K/UL (ref 4.8–10.8)

## 2022-05-24 PROCEDURE — 80061 LIPID PANEL: CPT

## 2022-05-24 PROCEDURE — 84443 ASSAY THYROID STIM HORMONE: CPT

## 2022-05-24 PROCEDURE — 82043 UR ALBUMIN QUANTITATIVE: CPT

## 2022-05-24 PROCEDURE — 80053 COMPREHEN METABOLIC PANEL: CPT

## 2022-05-24 PROCEDURE — 36415 COLL VENOUS BLD VENIPUNCTURE: CPT

## 2022-05-24 PROCEDURE — 85025 COMPLETE CBC W/AUTO DIFF WBC: CPT

## 2022-05-24 PROCEDURE — 84439 ASSAY OF FREE THYROXINE: CPT

## 2022-05-24 PROCEDURE — 82570 ASSAY OF URINE CREATININE: CPT

## 2022-06-03 ENCOUNTER — OFFICE VISIT (OUTPATIENT)
Dept: ENDOCRINOLOGY | Facility: MEDICAL CENTER | Age: 42
End: 2022-06-03
Attending: INTERNAL MEDICINE
Payer: COMMERCIAL

## 2022-06-03 VITALS
DIASTOLIC BLOOD PRESSURE: 70 MMHG | HEART RATE: 76 BPM | BODY MASS INDEX: 22.66 KG/M2 | HEIGHT: 73 IN | WEIGHT: 171 LBS | OXYGEN SATURATION: 97 % | SYSTOLIC BLOOD PRESSURE: 104 MMHG

## 2022-06-03 DIAGNOSIS — Z79.84 LONG TERM (CURRENT) USE OF ORAL HYPOGLYCEMIC DRUGS: ICD-10-CM

## 2022-06-03 DIAGNOSIS — E55.9 VITAMIN D DEFICIENCY: ICD-10-CM

## 2022-06-03 DIAGNOSIS — E78.5 DYSLIPIDEMIA: ICD-10-CM

## 2022-06-03 DIAGNOSIS — L74.52 FOCAL HYPERHIDROSIS DUE TO FREY SYNDROME: ICD-10-CM

## 2022-06-03 DIAGNOSIS — E11.8 TYPE 2 DIABETES MELLITUS WITH COMPLICATION, WITHOUT LONG-TERM CURRENT USE OF INSULIN (HCC): ICD-10-CM

## 2022-06-03 DIAGNOSIS — E11.9 CONTROLLED TYPE 2 DIABETES MELLITUS WITHOUT COMPLICATION, WITHOUT LONG-TERM CURRENT USE OF INSULIN (HCC): ICD-10-CM

## 2022-06-03 LAB
HBA1C MFR BLD: 6.6 % (ref 0–5.6)
INT CON NEG: ABNORMAL
INT CON POS: ABNORMAL

## 2022-06-03 PROCEDURE — 99214 OFFICE O/P EST MOD 30 MIN: CPT | Performed by: INTERNAL MEDICINE

## 2022-06-03 PROCEDURE — 99212 OFFICE O/P EST SF 10 MIN: CPT | Performed by: INTERNAL MEDICINE

## 2022-06-03 PROCEDURE — 83036 HEMOGLOBIN GLYCOSYLATED A1C: CPT | Performed by: INTERNAL MEDICINE

## 2022-06-03 RX ORDER — GLIMEPIRIDE 4 MG/1
4 TABLET ORAL
Qty: 90 TABLET | Refills: 3 | Status: SHIPPED | OUTPATIENT
Start: 2022-06-03 | End: 2022-12-06 | Stop reason: SDUPTHER

## 2022-06-03 ASSESSMENT — FIBROSIS 4 INDEX: FIB4 SCORE: 0.83

## 2022-06-03 ASSESSMENT — PATIENT HEALTH QUESTIONNAIRE - PHQ9: CLINICAL INTERPRETATION OF PHQ2 SCORE: 0

## 2022-06-03 NOTE — PROGRESS NOTES
CHIEF COMPLAINT: Patient is here for follow up of Type 2 Diabetes Mellitus.      HPI:     Jose De Jesus Banda is a 41 y.o. male with Type 2 Diabetes Mellitus here for follow up.    Labs from December 3, 2021 show A1c is 6.6%  Labs from 5/19/2021 show a1c was 8.1%  Labs from February 16, 2021 show A1c was 8.1%  Labs from 10/14/2020 show a1c a1c was 7.4%  Labs from July 15, 2020 show A1c was 7.4%      He is a very slim gentleman and despite his slim habitus his C-peptide is detectable at 1.3 and yasmine 65 antibodies are negative ruling out BENSON and type 1 diabetes    He has chronic  gustatory sweating and he sweats on his forehead, neck and face whenever he eats cheese or peels and orange.    He denies any history of Bell's palsy or facial nerve injury  He has responded to oral glycopyrrolate          On follow up he is now on   Ozempic 1.0mg weekly,   Synjardy XR 12.5/1000mg bid    Actos 30mg daily, and   Glimepiride 4 mg daily      He denies side effects with his medications  He reports good compliance  He reports that his fasting sugars are between  and dinnertime blood sugars are between 80- 159      He has a history of hyperlipidemia but is not on a statin because he still very young.  His LDL cholesterol was 108 on May 2022      He denies a history of essential hypertension  He does not have albuminuria anymore  U ACR was less than 30 on May 2022      He reports that he had an eye exam last week at [facility] and we are going to request records he was [facility].  His baseline TSH is normal.          BG Diary:  Patient did not bring glucose meter    Weight has been stable    Diabetes Complications   Retinopathy: No known retinopathy.  Last eye exam: June 2022 at [facility]  Neuropathy: Denies paresthesias or numbness in hands or feet. Denies any foot wounds.  Exercise: Minimal.  Diet: Fair.  Patient's medications, allergies, and social histories were reviewed and updated as appropriate.    ROS:     CONS:      No fever, no chills   EYES:     No diplopia, no blurry vision   CV:           No chest pain, no palpitations   PULM:     No SOB, no cough, no hemoptysis.   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)  2018-02: Hypertriglyceridemia      Past Surgical History:  Past Surgical History:   Procedure Laterality Date   • PB MANIPULATN SHLDR JT W ANESTHESIA Right 8/22/2019    Procedure: MANIPULATION, SHOULDER;  Surgeon: Karon Fisher M.D.;  Location: SURGERY [facility];  Service: Orthopedics   • VT SHLDR ARTHROSCOP,PART ACROMIOPLAS Right 8/22/2019    Procedure: DECOMPRESSION, SHOULDER, ARTHROSCOPIC- SUBACROMIAL;  Surgeon: Karon Fisher M.D.;  Location: [facility];  Service: Orthopedics   • SHOULDER ARTHROSCOPY Right 8/22/2019    Procedure: ARTHROSCOPY, SHOULDER- CAPSULE RELEASE;  Surgeon: Karon Fisher M.D.;  Location: [facility];  Service: Orthopedics   • COLONOSCOPY  01/2019        Allergies:  Patient has no known allergies.     Social History:  Social History     Tobacco Use   • Smoking status: Never Smoker   • Smokeless tobacco: Never Used   Vaping Use   • Vaping Use: Never used   Substance Use Topics   • Alcohol use: Yes     Comment: 6 per month   • Drug use: No        Family History:   family history includes Arthritis in his mother; Diabetes in his father and mother; Hypertension in his father; Lung Cancer in his maternal  "grandmother and paternal grandfather; Lung Disease in his father; Stroke in his mother.      PHYSICAL EXAM:   OBJECTIVE:  Vital signs: /70   Pulse 76   Ht 1.854 m (6' 1\")   Wt 77.6 kg (171 lb)   SpO2 97%   BMI 22.56 kg/m²   GENERAL: Well-developed, well-nourished in no apparent distress.   EYE:  No ocular asymmetry, PERRLA  HENT: Pink, moist mucous membranes.    NECK: No thyromegaly.   CARDIOVASCULAR: Normal precordial impulse seen with normal carotid pulsation  LUNGS: Symmetrical chest expansion with normal phonation of voice   ABDOMEN: Obese abdomen with no visible organomegaly  EXTREMITIES: No clubbing, cyanosis, or edema.   NEUROLOGICAL: No gross focal motor abnormalities   LYMPH: No cervical adenopathy seen.   SKIN: No rashes, lesions.       Labs:  Lab Results   Component Value Date/Time    HBA1C 6.6 (A) 06/03/2022 09:21 AM        Lab Results   Component Value Date/Time    WBC 4.6 (L) 05/24/2022 07:45 AM    RBC 4.90 05/24/2022 07:45 AM    HEMOGLOBIN 15.2 05/24/2022 07:45 AM    MCV 92.9 05/24/2022 07:45 AM    MCH 31.0 05/24/2022 07:45 AM    MCHC 33.4 (L) 05/24/2022 07:45 AM    RDW 43.8 05/24/2022 07:45 AM    MPV 10.2 05/24/2022 07:45 AM       Lab Results   Component Value Date/Time    SODIUM 140 05/24/2022 07:45 AM    POTASSIUM 5.3 05/24/2022 07:45 AM    CHLORIDE 105 05/24/2022 07:45 AM    CO2 25 05/24/2022 07:45 AM    ANION 10.0 05/24/2022 07:45 AM    GLUCOSE 138 (H) 05/24/2022 07:45 AM    BUN 23 (H) 05/24/2022 07:45 AM    CREATININE 1.03 05/24/2022 07:45 AM    CALCIUM 9.3 05/24/2022 07:45 AM    ASTSGOT 20 05/24/2022 07:45 AM    ALTSGPT 19 05/24/2022 07:45 AM    TBILIRUBIN 0.3 05/24/2022 07:45 AM    ALBUMIN 4.7 05/24/2022 07:45 AM    TOTPROTEIN 6.7 05/24/2022 07:45 AM    GLOBULIN 2.0 05/24/2022 07:45 AM    AGRATIO 2.4 05/24/2022 07:45 AM       Lab Results   Component Value Date/Time    CHOLSTRLTOT 171 05/09/2018 0734    TRIGLYCERIDE 167 (H) 05/09/2018 0734    HDL 45 05/09/2018 0734    LDL 93 " 05/09/2018 0734       Lab Results   Component Value Date/Time    MALBCRT 25 05/24/2022 07:44 AM    MICROALBUR 2.2 05/24/2022 07:44 AM        No results found for: TSHULTRASEN  No results found for: FREEDIR  No results found for: FREET3  No results found for: THYSTIMIG        ASSESSMENT/PLAN:     1. Controlled type 2 diabetes mellitus without complication, without long-term current use of insulin (HCC)  Controlled  Continue Synjardy, Ozempic, Actos and glimepiride  He is up-to-date with his labs  Recommend follow-up in 6 months with repeat of A1c     2. Dyslipidemia  Controlled  Continue observation and low-fat diet  Repeat fasting lipids in 12 months    3. Focal hyperhidrosis due to Any syndrome  Controlled  Continue oral glycopyrrolate      4. Long term (current) use of oral hypoglycemic drugs  Patient is on multiple oral agents for type 2 diabetes management      Return in about 6 months (around 12/3/2022).      Thank you kindly for allowing me to participate in the diabetes care plan for this patient.    Eldon Cassidy MD, FACE, [facility]  05/11/20    CC:   MARY [email or web address]

## 2022-06-03 NOTE — PROGRESS NOTES
"RN-CDE Note    Subjective:   Endocrinology Clinic Progress Note  PCP: Amanda Alvarenga M.D.    HPI:  Jose De Jesus Banda is a 41 y.o. old patient who is seen today for review of Type 2 Diabetes.  Recent changes in health: States head sweats when he eats.  DM:   Last A1c:   Lab Results   Component Value Date/Time    HBA1C 6.6 (A) 2022 09:21 AM      Previous A1c was 6.6 on 12/3/21  A1C GOAL: < 7    Diabetes Medications:   Actos 30 mg daily  Ozempic 1 mg weekly  Synjardy XR 12.5-1000 mg 2 daily  Glimepiride 4 mg daily       Exercise: Walking dog twice daily and lifting weights  Diet: \"healthy\" diet  in general  Patient's body mass index is 22.56 kg/m². Exercise and nutrition counseling were performed at this visit.    Glucose monitoring frequency: testing twice daily  Fastin-120's and Dinner:   Hypoglycemic episodes: no  Last Ret[facility] Exam: February and last week at [facility] in [place].  Sending to  [facility] next week  Daily Foot Exam: Yes   Foot Exam:  Monofilament: done  Monofilament testing with a 10 gram force: sensation intact: intact bilaterally  Visual Inspection: Feet without maceration, ulcers, fissures.  Pedal pulses: intact bilaterally   Lab Results   Component Value Date/Time    MALBCRT 25 2022 07:44 AM    MICROALBUR 2.2 2022 07:44 AM     He  reports that he has never smoked. He has never used smokeless tobacco.      Plan:     Discussed and educated on:   - All medications, side effects and compliance (discussed carefully)  - Annual eye examinations at Ophthalmology  - Home glucose monitoring emphasized  - Weight control and daily exercise    Recommended medication changes: No changes at this time.   "

## 2022-07-25 ENCOUNTER — OFFICE VISIT (OUTPATIENT)
Dept: MEDICAL GROUP | Facility: PHYSICIAN GROUP | Age: 42
End: 2022-07-25
Payer: COMMERCIAL

## 2022-07-25 VITALS
TEMPERATURE: 98 F | BODY MASS INDEX: 22.43 KG/M2 | DIASTOLIC BLOOD PRESSURE: 68 MMHG | HEIGHT: 73 IN | OXYGEN SATURATION: 98 % | WEIGHT: 169.2 LBS | HEART RATE: 83 BPM | SYSTOLIC BLOOD PRESSURE: 106 MMHG | RESPIRATION RATE: 16 BRPM

## 2022-07-25 DIAGNOSIS — Z00.00 WELLNESS EXAMINATION: ICD-10-CM

## 2022-07-25 DIAGNOSIS — E11.8 TYPE 2 DIABETES MELLITUS WITH COMPLICATION, WITHOUT LONG-TERM CURRENT USE OF INSULIN (HCC): ICD-10-CM

## 2022-07-25 PROCEDURE — 99395 PREV VISIT EST AGE 18-39: CPT | Performed by: FAMILY MEDICINE

## 2022-07-25 ASSESSMENT — FIBROSIS 4 INDEX: FIB4 SCORE: 0.83

## 2022-07-25 NOTE — PROGRESS NOTES
Subjective:     CC:   Chief Complaint   Patient presents with   • Follow-Up       HPI:   Jose De Jesus presents today for his annual wellness.  Patient does see Dr. Kaur regularly he does see his eye provider regularly also and is due to see him back again next week.  Patient will try to have them sent to the notes he states he tells them that all the time and they never do.  Patient has no concerns he sometimes has loose stools he had Clostridium a number years ago but this loose stools just come and go and is not like what he had before.  I did recommend if he notices it increasing to always come back and see me.    Past Medical History:   Diagnosis Date   • Allergy    • Diabetes (HCC) 05/2018    oral meds   • Infectious disease 08/2019    c-diff 9/18-11/18 pt took vanco, and is better, no active sxs currently   • Pain 08/2019    Right Shoulder       Social History     Tobacco Use   • Smoking status: Never Smoker   • Smokeless tobacco: Never Used   Vaping Use   • Vaping Use: Never used   Substance Use Topics   • Alcohol use: Yes     Comment: 6 per month   • Drug use: No       Current Outpatient Medications Ordered in Epic   Medication Sig Dispense Refill   • glimepiride (AMARYL) 4 MG Tab Take 1 Tablet by mouth before evening meal. 90 Tablet 3   • glycopyrrolate (ROBINUL) 1 MG Tab TAKE 1 TABLET BY MOUTH 3 TIMES A DAY 90 Tablet 5   • pioglitazone (ACTOS) 30 MG Tab TAKE 1 TABLET BY MOUTH EVERY DAY 90 Tablet 3   • Semaglutide, 1 MG/DOSE, (OZEMPIC, 1 MG/DOSE,) 4 MG/3ML Solution Pen-injector Inject 1 mg under the skin as directed every 7 days. 9 mL 3   • Empagliflozin-metFORMIN HCl ER (SYNJARDY XR) 12.5-1000 MG TABLET SR 24 HR Take 2 tablet by mouth every day. 180 Tablet 3   • Multiple Vitamin (MULTIVITAMIN ADULT PO) Take  by mouth.     • Turmeric 500 MG Cap Take  by mouth.     • Probiotic Product (PROBIOTIC ADVANCED PO) Take  by mouth.     • MILK THISTLE PLUS PO Take  by mouth.     • S-Adenosylmethionine (THEODORA-E PO) Take   "by mouth.     • glucose blood (JACQUELINE CONTOUR NEXT TEST) strip 1 Strip by Other route as needed. 100 Strip 3   • loratadine (CLARITIN) 10 MG Tab Take 10 mg by mouth every day.     • fluticasone (FLONASE) 50 MCG/ACT nasal spray Spray 1 Spray in nose every day.       No current [facility]-ordered facility-administered medications on file.       Allergies:  Patient has no known allergies.    Health Maintenance: Completed    ROS:  Gen: no fevers/chills, no changes in weight  Eyes: no changes in vision  ENT: no sore throat, no hearing loss, no bloody nose  Pulm: no sob, no cough  CV: no chest pain, no palpitations  GI: no nausea/vomiting  : no dysuria  MSk: no myalgias  Skin: no rash  Neuro: no headaches, no numbness/tingling  Heme/Lymph: no easy bruising    Objective:     Exam:  /68 (BP Location: Right arm, Patient Position: Sitting, BP Cuff Size: Adult)   Pulse 83   Temp 36.7 °C (98 °F) (Temporal)   Resp 16   Ht 1.854 m (6' 1\")   Wt 76.7 kg (169 lb 3.2 oz)   SpO2 98%   BMI 22.32 kg/m²  Body mass index is 22.32 kg/m².    Gen: Alert and oriented, No apparent distress.  Skin: Warm and dry.  No obvious lesions.  Eyes: Sclera wnl Pupils normal in size  ENT: Canals wnl and TM are not red  Lungs: Normal effort, CTA bilaterally, no wheezes, rhonchi, or rales  CV: Regular rate and rhythm. No murmurs, rubs, or gallops.  ABD: Soft non-tender no organomegaly  Musculoskeletal: Normal gait. No extremity cyanosis, clubbing, or edema.  Neuro: Oriented to person, place and time  Psych: Mood is wnl         Assessment & Plan:     41 y.o. male with the following -     1. Wellness examination  I did update his family history.  Patient is doing well he does not need a Tdap next one due is in 2028.  I also went through his labs Dr. Cassidy the one who ordered it he also went through what.  2. Type 2 diabetes mellitus with complication, without long-term current use of insulin (HCC)  Oral was written for him to continue seeing  " Khanh.  This is a chronic problem  - Referral to Endocrinology       No follow-ups on file.    Please note that this dictation was created using voice recognition software. I have made every reasonable attempt to correct obvious errors, but I expect that there are errors of grammar and possibly content that I did not discover before finalizing the note.

## 2022-11-27 DIAGNOSIS — L74.52 FOCAL HYPERHIDROSIS DUE TO FREY SYNDROME: ICD-10-CM

## 2022-11-28 ENCOUNTER — HOSPITAL ENCOUNTER (OUTPATIENT)
Dept: LAB | Facility: MEDICAL CENTER | Age: 42
End: 2022-11-28
Attending: INTERNAL MEDICINE
Payer: COMMERCIAL

## 2022-11-28 DIAGNOSIS — E55.9 VITAMIN D DEFICIENCY: ICD-10-CM

## 2022-11-28 DIAGNOSIS — L74.52 FOCAL HYPERHIDROSIS DUE TO FREY SYNDROME: ICD-10-CM

## 2022-11-28 DIAGNOSIS — E78.5 DYSLIPIDEMIA: ICD-10-CM

## 2022-11-28 DIAGNOSIS — E11.9 CONTROLLED TYPE 2 DIABETES MELLITUS WITHOUT COMPLICATION, WITHOUT LONG-TERM CURRENT USE OF INSULIN (HCC): ICD-10-CM

## 2022-11-28 DIAGNOSIS — Z79.84 LONG TERM (CURRENT) USE OF ORAL HYPOGLYCEMIC DRUGS: ICD-10-CM

## 2022-11-28 LAB
25(OH)D3 SERPL-MCNC: 24 NG/ML (ref 30–100)
ALBUMIN SERPL BCP-MCNC: 4.5 G/DL (ref 3.2–4.9)
ALBUMIN/GLOB SERPL: 1.8 G/DL
ALP SERPL-CCNC: 47 U/L (ref 30–99)
ALT SERPL-CCNC: 32 U/L (ref 2–50)
ANION GAP SERPL CALC-SCNC: 12 MMOL/L (ref 7–16)
AST SERPL-CCNC: 25 U/L (ref 12–45)
BILIRUB SERPL-MCNC: 0.2 MG/DL (ref 0.1–1.5)
BUN SERPL-MCNC: 20 MG/DL (ref 8–22)
CALCIUM SERPL-MCNC: 9.3 MG/DL (ref 8.5–10.5)
CHLORIDE SERPL-SCNC: 101 MMOL/L (ref 96–112)
CO2 SERPL-SCNC: 23 MMOL/L (ref 20–33)
CREAT SERPL-MCNC: 1 MG/DL (ref 0.5–1.4)
FASTING STATUS PATIENT QL REPORTED: NORMAL
GFR SERPLBLD CREATININE-BSD FMLA CKD-EPI: 98 ML/MIN/1.73 M 2
GLOBULIN SER CALC-MCNC: 2.5 G/DL (ref 1.9–3.5)
GLUCOSE SERPL-MCNC: 144 MG/DL (ref 65–99)
POTASSIUM SERPL-SCNC: 4.7 MMOL/L (ref 3.6–5.5)
PROT SERPL-MCNC: 7 G/DL (ref 6–8.2)
SODIUM SERPL-SCNC: 136 MMOL/L (ref 135–145)

## 2022-11-28 PROCEDURE — 80053 COMPREHEN METABOLIC PANEL: CPT

## 2022-11-28 PROCEDURE — 82306 VITAMIN D 25 HYDROXY: CPT

## 2022-11-28 PROCEDURE — 36415 COLL VENOUS BLD VENIPUNCTURE: CPT

## 2022-11-28 RX ORDER — GLYCOPYRROLATE 1 MG/1
TABLET ORAL
Qty: 90 TABLET | Refills: 5 | Status: SHIPPED | OUTPATIENT
Start: 2022-11-28 | End: 2023-06-01

## 2022-12-06 ENCOUNTER — OFFICE VISIT (OUTPATIENT)
Dept: ENDOCRINOLOGY | Facility: MEDICAL CENTER | Age: 42
End: 2022-12-06
Attending: INTERNAL MEDICINE
Payer: COMMERCIAL

## 2022-12-06 VITALS
HEIGHT: 73 IN | BODY MASS INDEX: 22.93 KG/M2 | OXYGEN SATURATION: 100 % | SYSTOLIC BLOOD PRESSURE: 108 MMHG | DIASTOLIC BLOOD PRESSURE: 72 MMHG | WEIGHT: 173 LBS | HEART RATE: 89 BPM

## 2022-12-06 DIAGNOSIS — E11.8 TYPE 2 DIABETES MELLITUS WITH COMPLICATION, WITHOUT LONG-TERM CURRENT USE OF INSULIN (HCC): ICD-10-CM

## 2022-12-06 DIAGNOSIS — L74.52 FOCAL HYPERHIDROSIS DUE TO FREY SYNDROME: ICD-10-CM

## 2022-12-06 DIAGNOSIS — Z79.84 LONG TERM (CURRENT) USE OF ORAL HYPOGLYCEMIC DRUGS: ICD-10-CM

## 2022-12-06 DIAGNOSIS — E11.9 CONTROLLED TYPE 2 DIABETES MELLITUS WITHOUT COMPLICATION, WITHOUT LONG-TERM CURRENT USE OF INSULIN (HCC): ICD-10-CM

## 2022-12-06 DIAGNOSIS — E55.9 VITAMIN D DEFICIENCY: ICD-10-CM

## 2022-12-06 DIAGNOSIS — E78.5 DYSLIPIDEMIA: ICD-10-CM

## 2022-12-06 LAB
HBA1C MFR BLD: 7 % (ref 0–5.6)
INT CON NEG: ABNORMAL
INT CON POS: ABNORMAL

## 2022-12-06 PROCEDURE — 83036 HEMOGLOBIN GLYCOSYLATED A1C: CPT | Performed by: INTERNAL MEDICINE

## 2022-12-06 PROCEDURE — 99214 OFFICE O/P EST MOD 30 MIN: CPT | Performed by: INTERNAL MEDICINE

## 2022-12-06 PROCEDURE — 99211 OFF/OP EST MAY X REQ PHY/QHP: CPT | Performed by: INTERNAL MEDICINE

## 2022-12-06 RX ORDER — SEMAGLUTIDE 1.34 MG/ML
1 INJECTION, SOLUTION SUBCUTANEOUS
Qty: 9 ML | Refills: 3 | Status: SHIPPED | OUTPATIENT
Start: 2022-12-06 | End: 2023-07-24 | Stop reason: SDUPTHER

## 2022-12-06 RX ORDER — PIOGLITAZONEHYDROCHLORIDE 30 MG/1
30 TABLET ORAL DAILY
Qty: 90 TABLET | Refills: 3 | Status: SHIPPED | OUTPATIENT
Start: 2022-12-06 | End: 2023-12-11

## 2022-12-06 RX ORDER — GLIMEPIRIDE 4 MG/1
4 TABLET ORAL
Qty: 90 TABLET | Refills: 3 | Status: SHIPPED | OUTPATIENT
Start: 2022-12-06 | End: 2023-12-12

## 2022-12-06 RX ORDER — EMPAGLIFLOZIN, METFORMIN HYDROCHLORIDE 12.5; 1 MG/1; MG/1
2 TABLET, EXTENDED RELEASE ORAL DAILY
Qty: 180 TABLET | Refills: 3 | Status: SHIPPED | OUTPATIENT
Start: 2022-12-06 | End: 2023-12-25

## 2022-12-06 ASSESSMENT — FIBROSIS 4 INDEX: FIB4 SCORE: 0.82

## 2022-12-06 NOTE — PROGRESS NOTES
CHIEF COMPLAINT: Patient is here for follow up of Type 2 Diabetes Mellitus.      HPI:     Jose De Jesus Banda is a 42 y.o. male with Type 2 Diabetes Mellitus here for follow up.      Labs from December 6, 2022 show A1c is 7.0%  Labs from December 3, 2021 show A1c was 6.6%  Labs from 5/19/2021 show a1c was 8.1%  Labs from February 16, 2021 show A1c was 8.1%  Labs from 10/14/2020 show a1c a1c was 7.4%  Labs from July 15, 2020 show A1c was 7.4%      He is a very slim gentleman and despite his slim habitus his C-peptide is detectable at 1.3 and yasmine 65 antibodies are negative ruling out BENSON and type 1 diabetes    He has chronic  gustatory sweating and he sweats on his forehead, neck and face whenever he eats cheese or peels and orange.    He denies any history of Bell's palsy or facial nerve injury  He has responded to oral glycopyrrolate          On follow up he is now on   Ozempic 1.0mg weekly,   Synjardy XR 12.5/1000mg bid    Actos 30mg daily, and   Glimepiride 4 mg daily      He denies side effects with his medications  He reports good compliance  He reports that his A1c is higher because he has not been watching his diet  He had family from [place] over the holidays      He has a history of hyperlipidemia but is not on a statin because he still very young.  His LDL cholesterol was 108 on May 2022      He denies a history of essential hypertension  He does not have albuminuria anymore  U ACR was less than 30 on May 2022      He had an eye exam [facility] on June 2022     He reports that he [facility] to  [facility] as [facility] but we do not have records of their eye exam      His baseline TSH is normal.          BG Diary:  Patient did not bring glucose meter    Weight has been stable    Diabetes Complications   Retinopathy: no Known retinopathy.  Last eye exam: June 2022 at [facility]  Neuropathy: Denies paresthesias or numbness in hands or feet. Denies any foot wounds.  Exercise: Minimal.  Diet: Fair.  Patient's medications,  allergies, and social histories were reviewed and updated as appropriate.    ROS:     CONS:     No fever, no chills   EYES:     No diplopia, no blurry vision   CV:           No chest pain, no palpitations   PULM:     No SOB, no cough, no hemoptysis.   GI:            No nausea, no vomiting, no diarrhea, no constipation   ENDO:     No polyuria, no polydipsia, no heat intolerance, no cold intolerance       Past Medical History:  Problem List:  2022-07: Wellness examination  2021-12: Focal hyperhidrosis due to Any syndrome  2021-02: Chronic left shoulder pain  2021-02: Diabetic polyneuropathy (HCC)  2020-11: Dyslipidemia  2020-11: Long term (current) use of oral hypoglycemic drugs  2018-02: Encounter to establish care with new doctor  2018-02: Need for vaccination  2018-02: Controlled type 2 diabetes mellitus without complication,   without long-term current use of insulin ([facility])  2018-02: Hypertriglyceridemia    Past Surgical History:  Past Surgical History:   Procedure Laterality Date    PB MANIPULATN SHLDR JT W ANESTHESIA Right 8/22/2019    Procedure: MANIPULATION, SHOULDER;  Surgeon: Karon Fisher M.D.;  Location: [facility];  Service: Orthopedics    [facility]    Procedure: DECOMPRESSION, SHOULDER, ARTHROSCOPIC- SUBACROMIAL;  Surgeon: Karon Fisher M.D.;  Location: [facility];  Service: Orthopedics    SHOULDER ARTHROSCOPY Right 8/22/2019    Procedure: ARTHROSCOPY, SHOULDER- CAPSULE RELEASE;  Surgeon: Karon Fisher M.D.;  Location: [facility];  Service: Orthopedics    COLONOSCOPY  01/2019        Allergies:  Patient has no known allergies.     Social History:  Social History     Tobacco Use    Smoking status: Never    Smokeless tobacco: Never   Vaping Use    Vaping Use: Never used   Substance Use Topics    Alcohol use: Yes     Comment: 6 per month    Drug use: No        Family History:   family history includes  "Arthritis in his mother; Diabetes in his father and mother; Hypertension in his father; Lung Cancer in his maternal grandmother and paternal grandfather; Lung Disease in his father; Stroke in his mother.      PHYSICAL EXAM:   OBJECTIVE:  Vital signs: /72 (BP Location: Right arm, Patient Position: Sitting, BP Cuff Size: Adult)   Pulse 89   Ht 1.854 m (6' 1\")   Wt 78.5 kg (173 lb)   SpO2 100%   BMI 22.82 kg/m²   GENERAL: Well-developed, well-nourished in no apparent distress.   EYE:  No ocular asymmetry, PERRLA  HENT: Pink, moist mucous membranes.    NECK: No thyromegaly.   CARDIOVASCULAR: Normal precordial impulse seen with normal carotid pulsation  LUNGS: Symmetrical chest expansion with normal phonation of voice   ABDOMEN: Obese abdomen with no visible organomegaly  EXTREMITIES: No clubbing, cyanosis, or edema.   NEUROLOGICAL: No gross focal motor abnormalities   LYMPH: No cervical adenopathy seen.   SKIN: No rashes, lesions.       Labs:  Lab Results   Component Value Date/Time    HBA1C 7.0 (A) 12/06/2022 09:15 AM        Lab Results   Component Value Date/Time    WBC 4.6 (L) 05/24/2022 07:45 AM    RBC 4.90 05/24/2022 07:45 AM    HEMOGLOBIN 15.2 05/24/2022 07:45 AM    MCV 92.9 05/24/2022 07:45 AM    MCH 31.0 05/24/2022 07:45 AM    MCHC 33.4 (L) 05/24/2022 07:45 AM    RDW 43.8 05/24/2022 07:45 AM    MPV 10.2 05/24/2022 07:45 AM       Lab Results   Component Value Date/Time    SODIUM 136 11/28/2022 09:53 AM    POTASSIUM 4.7 11/28/2022 09:53 AM    CHLORIDE 101 11/28/2022 09:53 AM    CO2 23 11/28/2022 09:53 AM    ANION 12.0 11/28/2022 09:53 AM    GLUCOSE 144 (H) 11/28/2022 09:53 AM    BUN 20 11/28/2022 09:53 AM    CREATININE 1.00 11/28/2022 09:53 AM    CALCIUM 9.3 11/28/2022 09:53 AM    ASTSGOT 25 11/28/2022 09:53 AM    ALTSGPT 32 11/28/2022 09:53 AM    TBILIRUBIN 0.2 11/28/2022 09:53 AM    ALBUMIN 4.5 11/28/2022 09:53 AM    TOTPROTEIN 7.0 11/28/2022 09:53 AM    GLOBULIN 2.5 11/28/2022 09:53 AM    AGRATIO 1.8 " 11/28/2022 09:53 AM       Lab Results   Component Value Date/Time    CHOLSTRLTOT 171 05/09/2018 0734    TRIGLYCERIDE 167 (H) 05/09/2018 0734    HDL 45 05/09/2018 0734    LDL 93 05/09/2018 0734       Lab Results   Component Value Date/Time    MALBCRT 25 05/24/2022 07:44 AM    MICROALBUR 2.2 05/24/2022 07:44 AM        No results found for: TSHULTRASEN  No results found for: FREEDIR  No results found for: FREET3  No results found for: THYSTIMIG        ASSESSMENT/PLAN:     1. Controlled type 2 diabetes mellitus without complication, without long-term current use of insulin (HCC)  Controlled  Continue Synjardy, Ozempic, Actos and glimepiride  He is up-to-date with his labs  I am going to reassess his beta cell function and check his C-peptide and yasmine 65 with his next labs in 6 months  Recommend follow-up in 6 months with repeat of A1c     2. Dyslipidemia  Controlled  Continue observation and low-fat diet  Repeat fasting lipids in 6 months    3. Focal hyperhidrosis due to Any syndrome  Controlled  Continue oral glycopyrrolate      4. Long term (current) use of oral hypoglycemic drugs  Patient is on multiple oral agents for type 2 diabetes management      Return in about 6 months (around 6/6/2023).      Thank you kindly for allowing me to participate in the diabetes care plan for this patient.    Eldon Cassidy MD, FACE, [facility]  05/11/20    CC:   IZZY Zelaya

## 2023-06-01 DIAGNOSIS — L74.52 FOCAL HYPERHIDROSIS DUE TO FREY SYNDROME: ICD-10-CM

## 2023-06-01 RX ORDER — GLYCOPYRROLATE 1 MG/1
TABLET ORAL
Qty: 90 TABLET | Refills: 5 | Status: SHIPPED | OUTPATIENT
Start: 2023-06-01 | End: 2023-12-14

## 2023-07-11 ENCOUNTER — HOSPITAL ENCOUNTER (OUTPATIENT)
Dept: LAB | Facility: MEDICAL CENTER | Age: 43
End: 2023-07-11
Attending: INTERNAL MEDICINE
Payer: COMMERCIAL

## 2023-07-11 DIAGNOSIS — Z79.84 LONG TERM (CURRENT) USE OF ORAL HYPOGLYCEMIC DRUGS: ICD-10-CM

## 2023-07-11 DIAGNOSIS — E55.9 VITAMIN D DEFICIENCY: ICD-10-CM

## 2023-07-11 DIAGNOSIS — E78.5 DYSLIPIDEMIA: ICD-10-CM

## 2023-07-11 DIAGNOSIS — E11.9 CONTROLLED TYPE 2 DIABETES MELLITUS WITHOUT COMPLICATION, WITHOUT LONG-TERM CURRENT USE OF INSULIN (HCC): ICD-10-CM

## 2023-07-11 DIAGNOSIS — L74.52 FOCAL HYPERHIDROSIS DUE TO FREY SYNDROME: ICD-10-CM

## 2023-07-11 LAB
25(OH)D3 SERPL-MCNC: 44 NG/ML (ref 30–100)
T4 FREE SERPL-MCNC: 1.13 NG/DL (ref 0.93–1.7)
TSH SERPL DL<=0.005 MIU/L-ACNC: 0.62 UIU/ML (ref 0.38–5.33)

## 2023-07-11 PROCEDURE — 36415 COLL VENOUS BLD VENIPUNCTURE: CPT

## 2023-07-11 PROCEDURE — 80061 LIPID PANEL: CPT

## 2023-07-11 PROCEDURE — 84681 ASSAY OF C-PEPTIDE: CPT

## 2023-07-11 PROCEDURE — 82570 ASSAY OF URINE CREATININE: CPT

## 2023-07-11 PROCEDURE — 80053 COMPREHEN METABOLIC PANEL: CPT

## 2023-07-11 PROCEDURE — 82043 UR ALBUMIN QUANTITATIVE: CPT

## 2023-07-11 PROCEDURE — 82306 VITAMIN D 25 HYDROXY: CPT

## 2023-07-11 PROCEDURE — 84443 ASSAY THYROID STIM HORMONE: CPT

## 2023-07-11 PROCEDURE — 86341 ISLET CELL ANTIBODY: CPT

## 2023-07-11 PROCEDURE — 84439 ASSAY OF FREE THYROXINE: CPT

## 2023-07-12 LAB
ALBUMIN SERPL BCP-MCNC: 4.7 G/DL (ref 3.2–4.9)
ALBUMIN/GLOB SERPL: 1.8 G/DL
ALP SERPL-CCNC: 50 U/L (ref 30–99)
ALT SERPL-CCNC: 15 U/L (ref 2–50)
ANION GAP SERPL CALC-SCNC: 13 MMOL/L (ref 7–16)
AST SERPL-CCNC: 17 U/L (ref 12–45)
BILIRUB SERPL-MCNC: 0.3 MG/DL (ref 0.1–1.5)
BUN SERPL-MCNC: 21 MG/DL (ref 8–22)
CALCIUM ALBUM COR SERPL-MCNC: 8.9 MG/DL (ref 8.5–10.5)
CALCIUM SERPL-MCNC: 9.5 MG/DL (ref 8.5–10.5)
CHLORIDE SERPL-SCNC: 103 MMOL/L (ref 96–112)
CHOLEST SERPL-MCNC: 156 MG/DL (ref 100–199)
CO2 SERPL-SCNC: 26 MMOL/L (ref 20–33)
CREAT SERPL-MCNC: 0.95 MG/DL (ref 0.5–1.4)
CREAT UR-MCNC: 78.17 MG/DL
FASTING STATUS PATIENT QL REPORTED: NORMAL
GFR SERPLBLD CREATININE-BSD FMLA CKD-EPI: 103 ML/MIN/1.73 M 2
GLOBULIN SER CALC-MCNC: 2.6 G/DL (ref 1.9–3.5)
GLUCOSE SERPL-MCNC: 154 MG/DL (ref 65–99)
HDLC SERPL-MCNC: 58 MG/DL
LDLC SERPL CALC-MCNC: 79 MG/DL
MICROALBUMIN UR-MCNC: 2.4 MG/DL
MICROALBUMIN/CREAT UR: 31 MG/G (ref 0–30)
POTASSIUM SERPL-SCNC: 4.7 MMOL/L (ref 3.6–5.5)
PROT SERPL-MCNC: 7.3 G/DL (ref 6–8.2)
SODIUM SERPL-SCNC: 142 MMOL/L (ref 135–145)
TRIGL SERPL-MCNC: 97 MG/DL (ref 0–149)

## 2023-07-13 LAB — C PEPTIDE SERPL-MCNC: 2 NG/ML (ref 0.5–3.3)

## 2023-07-14 LAB — GAD65 AB SER IA-ACNC: <5 IU/ML (ref 0–5)

## 2023-07-20 ENCOUNTER — APPOINTMENT (OUTPATIENT)
Dept: ENDOCRINOLOGY | Facility: MEDICAL CENTER | Age: 43
End: 2023-07-20
Attending: INTERNAL MEDICINE
Payer: COMMERCIAL

## 2023-07-20 NOTE — PROGRESS NOTES
RN-CDE Note    Subjective:   Endocrinology Clinic Progress Note  PCP: Amanda Alvarenga M.D.    HPI:  Jose De Jesus Banda is a 42 y.o. old patient who is seen today by the Diabetes Nurse Specialist for review of his type 2 diabetes.    Recent changes in health: ***  DM:   Last A1c:   Lab Results   Component Value Date/Time    HBA1C 7.0 (A) 12/06/2022 09:15 AM      Previous A1c was 7 on 12/6/22  A1C GOAL: < 7    Diabetes Medications:   Ozempic 1 mg weekly  Pioglitazone 30 mg daily  Glimepiride 4 mg with dinner  Synjardy 12.5/1000 mg bid      Exercise: {EXERCISE:61009}  Diet: {DIET HABITS:82927}  Patient's body mass index is unknown because there is no height or weight on file. Exercise and nutrition counseling were performed at this visit.    Glucose monitoring frequency: ***    Hypoglycemic episodes: {YES***/NO:60}  Last [facility] Exam: on file and up-to-date  Daily Foot Exam: {YES (DEF)/NO:58029}   Foot Exam:  Monofilament: current.   Lab Results   Component Value Date/Time    MALBCRT 31 (H) 07/11/2023 08:40 AM    MICROALBUR 2.4 07/11/2023 08:40 AM        ACR Albumin/Creatinine Ratio goal <30     HTN:   Blood pressure goal <130/<80 .   Currently Rx ACE/ARB:  no    Dyslipidemia:    Lab Results   Component Value Date/Time    CHOLSTRLTOT 156 07/11/2023 08:41 AM    LDL 79 07/11/2023 08:41 AM    HDL 58 07/11/2023 08:41 AM    TRIGLYCERIDE 97 07/11/2023 08:41 AM         Currently Rx Statin: Not Indicated     He  reports that he has never smoked. He has never used smokeless tobacco.      Plan:     Discussed and educated on:   {DIABETES RECOMMENDATIONS:71188}    Recommended medication changes: ***

## 2023-07-24 ENCOUNTER — NON-PROVIDER VISIT (OUTPATIENT)
Dept: ENDOCRINOLOGY | Facility: MEDICAL CENTER | Age: 43
End: 2023-07-24
Attending: INTERNAL MEDICINE
Payer: COMMERCIAL

## 2023-07-24 VITALS
HEART RATE: 72 BPM | OXYGEN SATURATION: 95 % | BODY MASS INDEX: 22.53 KG/M2 | DIASTOLIC BLOOD PRESSURE: 64 MMHG | HEIGHT: 73 IN | WEIGHT: 170 LBS | SYSTOLIC BLOOD PRESSURE: 104 MMHG

## 2023-07-24 DIAGNOSIS — E11.9 CONTROLLED TYPE 2 DIABETES MELLITUS WITHOUT COMPLICATION, WITHOUT LONG-TERM CURRENT USE OF INSULIN (HCC): ICD-10-CM

## 2023-07-24 LAB
HBA1C MFR BLD: 6.9 % (ref ?–5.8)
POCT INT CON NEG: NEGATIVE
POCT INT CON POS: POSITIVE

## 2023-07-24 PROCEDURE — 83036 HEMOGLOBIN GLYCOSYLATED A1C: CPT

## 2023-07-24 PROCEDURE — 99212 OFFICE O/P EST SF 10 MIN: CPT | Performed by: INTERNAL MEDICINE

## 2023-07-24 RX ORDER — SEMAGLUTIDE 1.34 MG/ML
1 INJECTION, SOLUTION SUBCUTANEOUS
Qty: 9 ML | Refills: 3 | Status: SHIPPED | OUTPATIENT
Start: 2023-07-24 | End: 2024-01-25

## 2023-07-24 RX ORDER — SEMAGLUTIDE 1.34 MG/ML
1 INJECTION, SOLUTION SUBCUTANEOUS
Qty: 12 ML | Refills: 3 | Status: SHIPPED | OUTPATIENT
Start: 2023-07-24 | End: 2023-07-24 | Stop reason: SDUPTHER

## 2023-07-24 ASSESSMENT — FIBROSIS 4 INDEX: FIB4 SCORE: 0.81

## 2023-07-24 NOTE — PROGRESS NOTES
RN-CDE Note    Subjective:   Endocrinology Clinic Progress Note  PCP: Amanda Alvarenga M.D.    HPI:  Jose De Jesus Banda is a 42 y.o. old patient who is seen today by the Diabetes Nurse Specialist for review of Type 2 Diabetes.  Recent changes in health: Health good.  DM:   Last A1c:   Lab Results   Component Value Date/Time    HBA1C 6.9 (A) 07/24/2023 10:30 AM      Previous A1c was 7.0 on 12/6/22  A1C GOAL: < 7    Diabetes Medications:   Glimepiride 4 mg daily  Synjardy 12.5-1000 mg BID  Ozempic 1 mg weekly  Actos 30 mg daily      Exercise: Walking dog and yard work  Diet: Breakfast is cereal.  Lunch and dinner varies but tries low carbohydrates.  Patient's body mass index is 22.43 kg/m². Exercise and nutrition counseling were performed at this visit.    Glucose monitoring frequency: Testing twice daily  100-130  Hypoglycemic episodes: no  Last Retinal Exam: on file and up-to-date  Daily Foot Exam: Yes   Foot Exam:  Monofilament: done  Monofilament testing with a 10 gram force: sensation intact: intact bilaterally  Visual Inspection: Feet without maceration, ulcers, fissures.  Pedal pulses: intact bilaterally   Lab Results   Component Value Date/Time    MALBCRT 31 (H) 07/11/2023 08:40 AM    MICROALBUR 2.4 07/11/2023 08:40 AM        ACR Albumin/Creatinine Ratio goal <30     HTN:   Blood pressure goal <130/<80 .   Currently Rx ACE/ARB: No     Dyslipidemia:    Lab Results   Component Value Date/Time    CHOLSTRLTOT 156 07/11/2023 08:41 AM    LDL 79 07/11/2023 08:41 AM    HDL 58 07/11/2023 08:41 AM    TRIGLYCERIDE 97 07/11/2023 08:41 AM         Currently Rx Statin: No     He  reports that he has never smoked. He has never used smokeless tobacco.      Plan:     Discussed and educated on:   - All medications, side effects and compliance (discussed carefully)  - Annual eye examinations at Ophthalmology  - Home glucose monitoring emphasized  - Weight control and daily exercise    Recommended medication changes: No changes at  this time.  He is having a hard time getting his Ozempic on auto refill so instead of 9 mg for 90 days we will try 12 ml for 3 months supply.  Follow up with Dr. Cassidy in 6 months.

## 2023-08-07 ENCOUNTER — OFFICE VISIT (OUTPATIENT)
Dept: MEDICAL GROUP | Facility: PHYSICIAN GROUP | Age: 43
End: 2023-08-07
Payer: COMMERCIAL

## 2023-08-07 VITALS
DIASTOLIC BLOOD PRESSURE: 62 MMHG | WEIGHT: 169 LBS | RESPIRATION RATE: 16 BRPM | HEART RATE: 78 BPM | SYSTOLIC BLOOD PRESSURE: 106 MMHG | OXYGEN SATURATION: 97 % | TEMPERATURE: 97.8 F | BODY MASS INDEX: 22.4 KG/M2 | HEIGHT: 73 IN

## 2023-08-07 DIAGNOSIS — R63.4 WEIGHT LOSS: ICD-10-CM

## 2023-08-07 DIAGNOSIS — E78.5 DYSLIPIDEMIA: ICD-10-CM

## 2023-08-07 DIAGNOSIS — E11.9 CONTROLLED TYPE 2 DIABETES MELLITUS WITHOUT COMPLICATION, WITHOUT LONG-TERM CURRENT USE OF INSULIN (HCC): ICD-10-CM

## 2023-08-07 PROCEDURE — 3078F DIAST BP <80 MM HG: CPT | Performed by: FAMILY MEDICINE

## 2023-08-07 PROCEDURE — 3074F SYST BP LT 130 MM HG: CPT | Performed by: FAMILY MEDICINE

## 2023-08-07 PROCEDURE — 99213 OFFICE O/P EST LOW 20 MIN: CPT | Performed by: FAMILY MEDICINE

## 2023-08-07 ASSESSMENT — FIBROSIS 4 INDEX: FIB4 SCORE: 0.81

## 2023-08-07 ASSESSMENT — PATIENT HEALTH QUESTIONNAIRE - PHQ9: CLINICAL INTERPRETATION OF PHQ2 SCORE: 0

## 2023-08-07 NOTE — PROGRESS NOTES
Subjective:     CC:   Chief Complaint   Patient presents with    Annual Wellness Visit       HPI:   Jose De Jesus presents today for follow-up.  Patient has no concerns.  Patient does see endocrinology for his diabetes patient recently had lab work done and his hemoglobin A1c was 6.9 on July 24.  Lipid, thyroid and vitamin D all look within normal limits this was ordered by Dr. Kaur    Past Medical History:   Diagnosis Date    Allergy     Diabetes (HCC) 05/2018    oral meds    Infectious disease 08/2019    c-diff 9/18-11/18 pt took vanco, and is better, no active sxs currently    Pain 08/2019    Right Shoulder       Social History     Tobacco Use    Smoking status: Never    Smokeless tobacco: Never   Vaping Use    Vaping Use: Never used   Substance Use Topics    Alcohol use: Yes     Comment: 6 per month    Drug use: No       Current Outpatient Medications Ordered in Epic   Medication Sig Dispense Refill    Vitamin D3 5000 Unit (125 mcg) Tab Take 5,000 Units by mouth every day.      Semaglutide, 1 MG/DOSE, (OZEMPIC, 1 MG/DOSE,) 4 MG/3ML Solution Pen-injector Inject 1 mg under the skin as directed every 7 days. 9 mL 3    glycopyrrolate (ROBINUL) 1 MG Tab TAKE 1 TABLET BY MOUTH THREE TIMES A DAY 90 Tablet 5    glimepiride (AMARYL) 4 MG Tab Take 1 Tablet by mouth before evening meal. 90 Tablet 3    pioglitazone (ACTOS) 30 MG Tab Take 1 Tablet by mouth every day. 90 Tablet 3    Empagliflozin-metFORMIN HCl ER (SYNJARDY XR) 12.5-1000 MG TABLET SR 24 HR Take 2 tablet by mouth every day. 180 Tablet 3    Multiple Vitamin (MULTIVITAMIN ADULT PO) Take  by mouth.      MILK THISTLE PLUS PO Take  by mouth.      S-Adenosylmethionine (THEODORA-E PO) Take  by mouth.      glucose blood (JACQUELINE CONTOUR NEXT TEST) strip 1 Strip by Other route as needed. 100 Strip 3    loratadine (CLARITIN) 10 MG Tab Take 10 mg by mouth every day.      fluticasone (FLONASE) 50 MCG/ACT nasal spray Spray 1 Spray in nose every day.       No current Epic-ordered  "facility-administered medications on file.       Allergies:  Patient has no known allergies.    Health Maintenance: Completed    ROS:  Gen: no fevers/chills, patient has lost some weight due to the fact when he went to [place] he was  hungry  Eyes: no changes in vision  ENT: no sore throat, no hearing loss, no bloody nose  Pulm: no sob, no cough  CV: no chest pain, no palpitations  GI: no nausea/vomiting, no diarrhea  : no dysuria  Neuro: no headaches, no numbness/tingling  Heme/Lymph: no easy bruising    Objective:     Exam:  /62 (BP Location: Right arm, Patient Position: Sitting, BP Cuff Size: Adult)   Pulse 78   Temp 36.6 °C (97.8 °F) (Temporal)   Resp 16   Ht 1.854 m (6' 1\")   Wt 76.7 kg (169 lb)   SpO2 97%   BMI 22.30 kg/m²  Body mass index is 22.3 kg/m².    Gen: Alert and oriented, No apparent distress.  Skin: Warm and dry.  No obvious lesions.  Eyes: Sclera wnl Pupils normal in size  Lungs: Normal effort, CTA bilaterally, no wheezes, rhonchi, or rales  CV: Regular rate and rhythm. No murmurs, rubs, or gallops.  ABD: Soft non-tender no organomegaly  Musculoskeletal: Normal gait. No extremity cyanosis, clubbing, or edema.  Neuro: Oriented to person, place and time  Psych: Mood is wnl       Assessment & Plan:     42 y.o. male with the following -     1. Weight loss  I would recommend seeing him back in the next couple months due to fact he has been losing weight patient was very agreeable patient does have appointment to see me in October.  Should see him back sooner if there is any more issues patient denies any problems with diarrhea.    2. Controlled type 2 diabetes mellitus without complication, without long-term current use of insulin (HCC)  It appears his diabetes is doing better patient's next appointment with endocrine is in January    3. Dyslipidemia  Patient's lipid panel is at goal       Return in about 2 months (around 10/7/2023), or if symptoms worsen or fail to improve.    Please note " that this dictation was created using voice recognition software. I have made every reasonable attempt to correct obvious errors, but I expect that there are errors of grammar and possibly content that I did not discover before finalizing the note.

## 2023-10-17 ENCOUNTER — OFFICE VISIT (OUTPATIENT)
Dept: MEDICAL GROUP | Facility: PHYSICIAN GROUP | Age: 43
End: 2023-10-17
Payer: COMMERCIAL

## 2023-10-17 VITALS
RESPIRATION RATE: 16 BRPM | WEIGHT: 172.3 LBS | HEART RATE: 70 BPM | HEIGHT: 73 IN | SYSTOLIC BLOOD PRESSURE: 106 MMHG | TEMPERATURE: 97.6 F | OXYGEN SATURATION: 98 % | DIASTOLIC BLOOD PRESSURE: 64 MMHG | BODY MASS INDEX: 22.83 KG/M2

## 2023-10-17 DIAGNOSIS — Z23 NEED FOR VACCINATION: ICD-10-CM

## 2023-10-17 DIAGNOSIS — R63.4 WEIGHT LOSS: ICD-10-CM

## 2023-10-17 DIAGNOSIS — E11.9 CONTROLLED TYPE 2 DIABETES MELLITUS WITHOUT COMPLICATION, WITHOUT LONG-TERM CURRENT USE OF INSULIN (HCC): ICD-10-CM

## 2023-10-17 PROCEDURE — 3078F DIAST BP <80 MM HG: CPT | Performed by: FAMILY MEDICINE

## 2023-10-17 PROCEDURE — 99213 OFFICE O/P EST LOW 20 MIN: CPT | Mod: 25 | Performed by: FAMILY MEDICINE

## 2023-10-17 PROCEDURE — 3074F SYST BP LT 130 MM HG: CPT | Performed by: FAMILY MEDICINE

## 2023-10-17 PROCEDURE — 90471 IMMUNIZATION ADMIN: CPT | Performed by: FAMILY MEDICINE

## 2023-10-17 PROCEDURE — 90686 IIV4 VACC NO PRSV 0.5 ML IM: CPT | Performed by: FAMILY MEDICINE

## 2023-10-17 ASSESSMENT — FIBROSIS 4 INDEX: FIB4 SCORE: 0.81

## 2023-10-17 NOTE — PROGRESS NOTES
Subjective:     CC:   Chief Complaint   Patient presents with    Follow-Up       HPI:   Jose De Jesus presents today for follow-up.  Patient will be seeing Dr. Kaur for his diabetes in January.  Patient has not been able to get Ozempic but he states his fingersticks are ranging from 104-154 in the evening.     Past Medical History:   Diagnosis Date    Allergy     Diabetes (HCC) 05/2018    oral meds    Infectious disease 08/2019    c-diff 9/18-11/18 pt took vanco, and is better, no active sxs currently    Pain 08/2019    Right Shoulder       Social History     Tobacco Use    Smoking status: Never    Smokeless tobacco: Never   Vaping Use    Vaping Use: Never used   Substance Use Topics    Alcohol use: Yes     Comment: 6 per month    Drug use: No       Current Outpatient Medications Ordered in Epic   Medication Sig Dispense Refill    Vitamin D3 5000 Unit (125 mcg) Tab Take 5,000 Units by mouth every day.      Semaglutide, 1 MG/DOSE, (OZEMPIC, 1 MG/DOSE,) 4 MG/3ML Solution Pen-injector Inject 1 mg under the skin as directed every 7 days. 9 mL 3    glycopyrrolate (ROBINUL) 1 MG Tab TAKE 1 TABLET BY MOUTH THREE TIMES A DAY 90 Tablet 5    glimepiride (AMARYL) 4 MG Tab Take 1 Tablet by mouth before evening meal. 90 Tablet 3    pioglitazone (ACTOS) 30 MG Tab Take 1 Tablet by mouth every day. 90 Tablet 3    Empagliflozin-metFORMIN HCl ER (SYNJARDY XR) 12.5-1000 MG TABLET SR 24 HR Take 2 tablet by mouth every day. 180 Tablet 3    Multiple Vitamin (MULTIVITAMIN ADULT PO) Take  by mouth.      MILK THISTLE PLUS PO Take  by mouth.      S-Adenosylmethionine (THEODORA-E PO) Take  by mouth.      glucose blood (JACQUELINE CONTOUR NEXT TEST) strip 1 Strip by Other route as needed. 100 Strip 3    loratadine (CLARITIN) 10 MG Tab Take 10 mg by mouth every day.      fluticasone (FLONASE) 50 MCG/ACT nasal spray Spray 1 Spray in nose every day.       No current [facility]-ordered facility-administered medications on file.       Allergies:  Patient has no  "known allergies.    Health Maintenance: Completed    ROS:  Gen: no fevers/chills  Eyes: no changes in vision  ENT: no sore throat, no hearing loss, no bloody nose  Pulm: no sob, no cough  CV: no chest pain, no palpitations  GI: no nausea/vomiting, no diarrhea  : no dysuria  Neuro: no headaches, no numbness/tingling  Heme/Lymph: no easy bruising    Objective:     Exam:  /64 (BP Location: Right arm, Patient Position: Sitting, BP Cuff Size: Adult)   Pulse 70   Temp 36.4 °C (97.6 °F) (Temporal)   Resp 16   Ht 1.854 m (6' 1\")   Wt 78.2 kg (172 lb 4.8 oz)   SpO2 98%   BMI 22.73 kg/m²  Body mass index is 22.73 kg/m².    Gen: Alert and oriented, No apparent distress.  Skin: Warm and dry.  No obvious lesions.  Eyes: Sclera wnl Pupils normal in size  Lungs: Normal effort, CTA bilaterally, no wheezes, rhonchi, or rales  CV: Regular rate and rhythm. No murmurs, rubs, or gallops.  Musculoskeletal: Normal gait. No extremity cyanosis, clubbing, or edema.  Neuro: Oriented to person, place and time  Psych: Mood is wnl       Assessment & Plan:     42 y.o. male with the following -     1. Weight loss  Patient has gained 3 pounds in the last couple months.  So this is a resolved problem.  Patient's thinks when he was visiting relatives he was more active and that is why he lost weight.    2. Controlled type 2 diabetes mellitus without complication, without long-term current use of insulin (HCC)  Patient apparently is doing well even not on the Ozempic since its hard to get at this time.  Patient will continue to monitor his fingersticks.  I did offer for him to follow-up with me for another hemoglobin A1c in December he will consider this if his fingersticks increase.  Patient is thinking of even if he gets the Ozempic waiting depending what his fingersticks are he can discuss this further with Dr. Cassidy.    3. Need for vaccination  - INFLUENZA VACCINE QUAD INJ (PF)       Return in about 8 months (around 6/17/2024), " or if symptoms worsen or fail to improve.    Please note that this dictation was created using voice recognition software. I have made every reasonable attempt to correct obvious errors, but I expect that there are errors of grammar and possibly content that I did not discover before finalizing the note.

## 2023-10-31 ENCOUNTER — TELEPHONE (OUTPATIENT)
Dept: ENDOCRINOLOGY | Facility: MEDICAL CENTER | Age: 43
End: 2023-10-31
Payer: COMMERCIAL

## 2023-10-31 DIAGNOSIS — E11.9 CONTROLLED TYPE 2 DIABETES MELLITUS WITHOUT COMPLICATION, WITHOUT LONG-TERM CURRENT USE OF INSULIN (HCC): ICD-10-CM

## 2023-10-31 RX ORDER — SEMAGLUTIDE 0.68 MG/ML
1 INJECTION, SOLUTION SUBCUTANEOUS
Qty: 6 ML | Refills: 0 | Status: SHIPPED | OUTPATIENT
Start: 2023-10-31 | End: 2023-11-22 | Stop reason: SDUPTHER

## 2023-10-31 NOTE — TELEPHONE ENCOUNTER
Pt called and stated he has been unable to get his ozempic filled at [facility] due to the shortage. He's been out for almost a month now, but his numbers are starting to increase. He would like advice on what he should do.

## 2023-11-01 ENCOUNTER — PHARMACY VISIT (OUTPATIENT)
Dept: PHARMACY | Facility: MEDICAL CENTER | Age: 43
End: 2023-11-01
Payer: COMMERCIAL

## 2023-11-01 ENCOUNTER — TELEPHONE (OUTPATIENT)
Dept: ENDOCRINOLOGY | Facility: MEDICAL CENTER | Age: 43
End: 2023-11-01
Payer: COMMERCIAL

## 2023-11-01 PROCEDURE — RXMED WILLOW AMBULATORY MEDICATION CHARGE: Performed by: INTERNAL MEDICINE

## 2023-11-01 NOTE — TELEPHONE ENCOUNTER
Spoke with pt to let him know Dr. Cassidy ordered ozempic sample sent to [facility] pharmacy.

## 2023-11-22 DIAGNOSIS — E11.9 CONTROLLED TYPE 2 DIABETES MELLITUS WITHOUT COMPLICATION, WITHOUT LONG-TERM CURRENT USE OF INSULIN (HCC): ICD-10-CM

## 2023-11-22 RX ORDER — SEMAGLUTIDE 0.68 MG/ML
1 INJECTION, SOLUTION SUBCUTANEOUS
Qty: 6 ML | Refills: 0 | Status: SHIPPED | OUTPATIENT
Start: 2023-11-22 | End: 2024-02-24

## 2023-12-08 DIAGNOSIS — E11.8 TYPE 2 DIABETES MELLITUS WITH COMPLICATION, WITHOUT LONG-TERM CURRENT USE OF INSULIN (HCC): ICD-10-CM

## 2023-12-11 RX ORDER — PIOGLITAZONEHYDROCHLORIDE 30 MG/1
30 TABLET ORAL DAILY
Qty: 90 TABLET | Refills: 3 | Status: SHIPPED | OUTPATIENT
Start: 2023-12-11

## 2023-12-12 DIAGNOSIS — E11.9 CONTROLLED TYPE 2 DIABETES MELLITUS WITHOUT COMPLICATION, WITHOUT LONG-TERM CURRENT USE OF INSULIN (HCC): ICD-10-CM

## 2023-12-12 RX ORDER — GLIMEPIRIDE 4 MG/1
4 TABLET ORAL
Qty: 90 TABLET | Refills: 3 | Status: SHIPPED | OUTPATIENT
Start: 2023-12-12

## 2023-12-14 DIAGNOSIS — L74.52 FOCAL HYPERHIDROSIS DUE TO FREY SYNDROME: ICD-10-CM

## 2023-12-14 RX ORDER — GLYCOPYRROLATE 1 MG/1
TABLET ORAL
Qty: 90 TABLET | Refills: 5 | Status: SHIPPED | OUTPATIENT
Start: 2023-12-14

## 2023-12-23 DIAGNOSIS — E11.9 CONTROLLED TYPE 2 DIABETES MELLITUS WITHOUT COMPLICATION, WITHOUT LONG-TERM CURRENT USE OF INSULIN (HCC): ICD-10-CM

## 2023-12-25 RX ORDER — EMPAGLIFLOZIN, METFORMIN HYDROCHLORIDE 12.5; 1 MG/1; MG/1
2 TABLET, EXTENDED RELEASE ORAL
Qty: 180 TABLET | Refills: 3 | Status: SHIPPED | OUTPATIENT
Start: 2023-12-25

## 2024-01-25 ENCOUNTER — OFFICE VISIT (OUTPATIENT)
Dept: ENDOCRINOLOGY | Facility: MEDICAL CENTER | Age: 44
End: 2024-01-25
Attending: INTERNAL MEDICINE
Payer: COMMERCIAL

## 2024-01-25 VITALS
WEIGHT: 172 LBS | DIASTOLIC BLOOD PRESSURE: 78 MMHG | HEIGHT: 73 IN | SYSTOLIC BLOOD PRESSURE: 118 MMHG | HEART RATE: 77 BPM | RESPIRATION RATE: 17 BRPM | BODY MASS INDEX: 22.8 KG/M2 | OXYGEN SATURATION: 100 %

## 2024-01-25 DIAGNOSIS — E11.9 CONTROLLED TYPE 2 DIABETES MELLITUS WITHOUT COMPLICATION, WITHOUT LONG-TERM CURRENT USE OF INSULIN (HCC): ICD-10-CM

## 2024-01-25 DIAGNOSIS — L74.52 FOCAL HYPERHIDROSIS DUE TO FREY SYNDROME: ICD-10-CM

## 2024-01-25 DIAGNOSIS — E55.9 VITAMIN D DEFICIENCY: ICD-10-CM

## 2024-01-25 DIAGNOSIS — E78.5 DYSLIPIDEMIA: ICD-10-CM

## 2024-01-25 DIAGNOSIS — Z79.84 LONG TERM (CURRENT) USE OF ORAL HYPOGLYCEMIC DRUGS: ICD-10-CM

## 2024-01-25 LAB
HBA1C MFR BLD: 7 % (ref ?–5.8)
POCT INT CON NEG: NEGATIVE
POCT INT CON POS: POSITIVE

## 2024-01-25 PROCEDURE — 83036 HEMOGLOBIN GLYCOSYLATED A1C: CPT | Performed by: INTERNAL MEDICINE

## 2024-01-25 PROCEDURE — 99213 OFFICE O/P EST LOW 20 MIN: CPT | Performed by: INTERNAL MEDICINE

## 2024-01-25 PROCEDURE — 3078F DIAST BP <80 MM HG: CPT | Performed by: INTERNAL MEDICINE

## 2024-01-25 PROCEDURE — 3074F SYST BP LT 130 MM HG: CPT | Performed by: INTERNAL MEDICINE

## 2024-01-25 PROCEDURE — 99214 OFFICE O/P EST MOD 30 MIN: CPT | Performed by: INTERNAL MEDICINE

## 2024-01-25 RX ORDER — SEMAGLUTIDE 0.68 MG/ML
0.5 INJECTION, SOLUTION SUBCUTANEOUS
Qty: 3 ML | Refills: 11 | Status: SHIPPED | OUTPATIENT
Start: 2024-01-25 | End: 2024-01-30

## 2024-01-25 ASSESSMENT — FIBROSIS 4 INDEX: FIB4 SCORE: 0.83

## 2024-01-25 NOTE — PROGRESS NOTES
"RN-CDE Note    Subjective:   Endocrinology Clinic Progress Note  PCP: Amanda Alvarenga M.D.    HPI:  Jose De Jesus Banda is a 43 y.o. old patient who is seen today by the Diabetes Nurse Specialist for review of his type 2 diabetes.    Recent changes in health: no changes in health  DM:   Last A1c:   Lab Results   Component Value Date/Time    HBA1C 7.0 (A) 01/25/2024 10:51 AM      Previous A1c was 6.9 on 7/24/23  A1C GOAL: < 7    Diabetes Medications:   Synjardy 12.5/1000 mg bid  Ozempic 0.5 mg weekly (was previously taking the 1 mg, couldn't get for about a month.  Was sampled the 0.5 dose and seems to be doing well with less nausea.  Will keep on the 0.5 mg dose. )  Actos 30 mg daily  Glimepiride 4 mg with evening meal.       Exercise: moderate regular exercise, aerobic < 3 days a week  Diet: \"healthy\" diet  in general  Eating more snacks over the holidays .  Patient's body mass index is 22.69 kg/m². Exercise and nutrition counseling were performed at this visit.    Glucose monitoring frequency: testing blood sugars bid .  Fasting usually  ac dinner     Hypoglycemic episodes: no  Last Retinal Exam: on file and up-to-date     Foot Exam:  Monofilament: current.   Lab Results   Component Value Date/Time    MALBCRT 31 (H) 07/11/2023 08:40 AM    MICROALBUR 2.4 07/11/2023 08:40 AM        ACR Albumin/Creatinine Ratio goal <30     HTN:   Blood pressure goal <130/<80 .   Currently Rx ACE/ARB: No     Dyslipidemia:    Lab Results   Component Value Date/Time    CHOLSTRLTOT 156 07/11/2023 08:41 AM    LDL 79 07/11/2023 08:41 AM    HDL 58 07/11/2023 08:41 AM    TRIGLYCERIDE 97 07/11/2023 08:41 AM         Currently Rx Statin: No     He  reports that he has never smoked. He has never used smokeless tobacco.      Plan:     Discussed and educated on:   - All medications, side effects and compliance (discussed carefully)  - Annual eye examinations at Ophthalmology  - HbA1C: target  - Home glucose monitoring emphasized  - " Weight control and daily exercise    Recommended medication changes: continued with only the Ozempic 0.5

## 2024-01-25 NOTE — PROGRESS NOTES
CHIEF COMPLAINT: Patient is here for follow up of Type 2 Diabetes Mellitus.      HPI:     Jose De Jesus Banda is a 43 y.o. male with Type 2 Diabetes Mellitus here for follow up.      Labs from 1/25/2024 show A1c is 7.0%  Labs from 7/24/2023 show A1c was 6.9%  Labs from 12/6/2022 show A1c was 7.0%  Labs from 12/3/2021 show A1c was 6.6%  Labs from 5/19/2021 show a1c was 8.1%  Labs from 2/16/2021 show A1c was 8.1%  Labs from 10/14/2020 show a1c a1c was 7.4%  Labs from 7/5/2020 show A1c was 7.4%      He is a very slim gentleman and despite his slim habitus his C-peptide is detectable at 1.3 and yasmine 65 antibodies are negative ruling out BENSON and type 1 diabetes    He has chronic  gustatory sweating and he sweats on his forehead, neck and face whenever he eats cheese or peels and orange.    He denies any history of Bell's palsy or facial nerve injury  He has responded to oral glycopyrrolate          On follow up he is now on   Ozempic 0.5mg weekly,   Synjardy XR 12.5/1000mg bid    Actos 30mg daily, and   Glimepiride 4 mg daily      He denies side effects with his medications  He was out of Ozempic for 1 month due to supply chain issues   His prescription was subsequently changed to 0.5 mg dose of Ozempic because this was in stock      He has a history of hyperlipidemia but is not on a statin because he still very young.  His LDL cholesterol was 79 on 7/2023       He denies a history of essential hypertension  He no longer has significant albuminuria   U ACR was less than 31 on 7/2023      He had an eye exam on 9/12/2023  He also goes to  [facility]    His baseline TSH is normal.          BG Diary:  Patient did not bring glucose meter    Weight has been stable    Diabetes Complications   Retinopathy: Known retinopathy.  Last eye exam: 9/12/203  Neuropathy: Denies paresthesias or numbness in hands or feet. Denies any foot wounds.  Exercise: Minimal.  Diet: Fair.  Patient's medications, allergies, and social histories were  reviewed and updated as appropriate.    ROS:     CONS:     No fever, no chills   EYES:     No diplopia, no blurry vision   CV:           No chest pain, no palpitations   PULM:     No SOB, no cough, no hemoptysis.   GI:            No nausea, no vomiting, no diarrhea, no constipation   ENDO:     No polyuria, no polydipsia, no heat intolerance, no cold intolerance       Past Medical History:  Problem List:  2023-08: Weight loss  2022-07: Wellness examination  2021-12: Focal hyperhidrosis due to Any syndrome  2021-02: Chronic left shoulder pain  2021-02: Diabetic polyneuropathy (HCC)  2020-11: Dyslipidemia  2020-11: Long term (current) use of oral hypoglycemic drugs  2018-02: Encounter to establish care with new doctor  2018-02: Need for vaccination  2018-02: Controlled type 2 diabetes mellitus without complication,   without long-term current use of insulin ([facility])  2018-02: Hypertriglyceridemia      Past Surgical History:  Past Surgical History:   Procedure Laterality Date    PB MANIPULATN SHLDR JT W ANESTHESIA Right 8/22/2019    Procedure: MANIPULATION, SHOULDER;  Surgeon: Karon Fisher M.D.;  Location: [facility];  Service: Orthopedics    [facility]    Procedure: DECOMPRESSION, SHOULDER, ARTHROSCOPIC- SUBACROMIAL;  Surgeon: Karon Fisher M.D.;  Location: [facility];  Service: Orthopedics    SHOULDER ARTHROSCOPY Right 8/22/2019    Procedure: ARTHROSCOPY, SHOULDER- CAPSULE RELEASE;  Surgeon: Karon Fisher M.D.;  Location: [facility];  Service: Orthopedics    COLONOSCOPY  01/2019        Allergies:  Patient has no known allergies.     Social History:  Social History     Tobacco Use    Smoking status: Never    Smokeless tobacco: Never   Vaping Use    Vaping Use: Never used   Substance Use Topics    Alcohol use: Yes     Comment: 6 per month    Drug use: No        Family History:   family history includes Arthritis in  "his mother; Diabetes in his father and mother; Hypertension in his father; Lung Cancer in his maternal grandmother and paternal grandfather; Lung Disease in his father; Stroke in his mother.      PHYSICAL EXAM:   OBJECTIVE:  Vital signs: /78 (BP Location: Right arm, Patient Position: Sitting)   Pulse 77   Resp 17   Ht 1.854 m (6' 1\")   Wt 78 kg (172 lb)   SpO2 100%   BMI 22.69 kg/m²   GENERAL: Well-developed, well-nourished in no apparent distress.   EYE:  No ocular asymmetry, PERRLA  HENT: Pink, moist mucous membranes.    NECK: No thyromegaly.   CARDIOVASCULAR: Normal precordial impulse seen with normal carotid pulsation  LUNGS: Symmetrical chest expansion with normal phonation of voice   ABDOMEN: Obese abdomen with no visible organomegaly  EXTREMITIES: No clubbing, cyanosis, or edema.   NEUROLOGICAL: No gross focal motor abnormalities   LYMPH: No cervical adenopathy seen.   SKIN: No rashes, lesions.       Labs:  Lab Results   Component Value Date/Time    HBA1C 7.0 (A) 01/25/2024 10:51 AM        Lab Results   Component Value Date/Time    WBC 4.6 (L) 05/24/2022 07:45 AM    RBC 4.90 05/24/2022 07:45 AM    HEMOGLOBIN 15.2 05/24/2022 07:45 AM    MCV 92.9 05/24/2022 07:45 AM    MCH 31.0 05/24/2022 07:45 AM    MCHC 33.4 (L) 05/24/2022 07:45 AM    RDW 43.8 05/24/2022 07:45 AM    MPV 10.2 05/24/2022 07:45 AM       Lab Results   Component Value Date/Time    SODIUM 142 07/11/2023 08:41 AM    POTASSIUM 4.7 07/11/2023 08:41 AM    CHLORIDE 103 07/11/2023 08:41 AM    CO2 26 07/11/2023 08:41 AM    ANION 13.0 07/11/2023 08:41 AM    GLUCOSE 154 (H) 07/11/2023 08:41 AM    BUN 21 07/11/2023 08:41 AM    CREATININE 0.95 07/11/2023 08:41 AM    CALCIUM 9.5 07/11/2023 08:41 AM    ASTSGOT 17 07/11/2023 08:41 AM    ALTSGPT 15 07/11/2023 08:41 AM    TBILIRUBIN 0.3 07/11/2023 08:41 AM    ALBUMIN 4.7 07/11/2023 08:41 AM    TOTPROTEIN 7.3 07/11/2023 08:41 AM    GLOBULIN 2.6 07/11/2023 08:41 AM    AGRATIO 1.8 07/11/2023 08:41 AM "       Lab Results   Component Value Date/Time    CHOLSTRLTOT 171 05/09/2018 0734    TRIGLYCERIDE 167 (H) 05/09/2018 0734    HDL 45 05/09/2018 0734    LDL 93 05/09/2018 0734       Lab Results   Component Value Date/Time    MALBCRT 31 (H) 07/11/2023 08:40 AM    MICROALBUR 2.4 07/11/2023 08:40 AM        No results found for: TSHULTRASEN  No results found for: FREEDIR  No results found for: FREET3  No results found for: THYSTIMIG        ASSESSMENT/PLAN:     1. Controlled type 2 diabetes mellitus without complication, without long-term current use of insulin (HCC)  Controlled  Continue Synjardy, Ozempic, Actos and glimepiride  We did change the prescription of Ozempic to the 0.5 mg weekly dose because this is in stock  Recommend follow-up with [facility] in 6 months with complete fasting labs    2. Dyslipidemia  Controlled  Continue observation and low-fat diet  Repeat fasting lipids in 6 months  He should be able to start statin therapy soon because of his advancing age    3. Focal hyperhidrosis due to Any syndrome  Controlled  Continue oral glycopyrrolate    4. Long term (current) use of oral hypoglycemic drugs  Patient is on multiple oral agents for type 2 diabetes management      Return in about 6 months (around 7/25/2024).      Thank you kindly for allowing me to participate in the diabetes care plan for this patient.    Eldon Cassidy MD, FACE, [facility]      CC:   IZZY Zelaya

## 2024-01-26 ENCOUNTER — TELEPHONE (OUTPATIENT)
Dept: ENDOCRINOLOGY | Facility: MEDICAL CENTER | Age: 44
End: 2024-01-26
Payer: COMMERCIAL

## 2024-01-26 NOTE — TELEPHONE ENCOUNTER
Prior Authorization for Ozempic 0.25-0.5mg/dose 2mg/3ml Sol Pen-inj  (Quantity: 9mls, Days: 84) has been submitted via Cover My Meds: Key ([id number])    Insurance: [employer] [place]    Will follow up in 24-48 business hours.

## 2024-01-29 NOTE — TELEPHONE ENCOUNTER
Received response via CMM stating that:    -Medication Previously Approved.Your request has been dismissed as this request was approved.our request for coverage of OZEMPIC (0.25 OR 0.5 MG/DOSE) Soln Pen-inj has been dismissed as this request was approved on 5/1/2023 for 3 per 28 days.    PA On file should be good until 05/01/24    Pharmacy not contracted with [facility]

## 2024-01-30 DIAGNOSIS — E11.9 CONTROLLED TYPE 2 DIABETES MELLITUS WITHOUT COMPLICATION, WITHOUT LONG-TERM CURRENT USE OF INSULIN (HCC): ICD-10-CM

## 2024-01-30 RX ORDER — SEMAGLUTIDE 0.68 MG/ML
0.5 INJECTION, SOLUTION SUBCUTANEOUS
Qty: 3 ML | Refills: 11 | Status: SHIPPED | OUTPATIENT
Start: 2024-01-30

## 2024-02-24 ENCOUNTER — APPOINTMENT (OUTPATIENT)
Dept: RADIOLOGY | Facility: MEDICAL CENTER | Age: 44
DRG: 380 | End: 2024-02-24
Attending: EMERGENCY MEDICINE
Payer: COMMERCIAL

## 2024-02-24 ENCOUNTER — HOSPITAL ENCOUNTER (INPATIENT)
Facility: MEDICAL CENTER | Age: 44
LOS: 4 days | DRG: 380 | End: 2024-02-28
Attending: EMERGENCY MEDICINE | Admitting: HOSPITALIST
Payer: COMMERCIAL

## 2024-02-24 DIAGNOSIS — R94.31 ABNORMAL EKG: ICD-10-CM

## 2024-02-24 DIAGNOSIS — R03.0 ELEVATED BLOOD PRESSURE READING: ICD-10-CM

## 2024-02-24 DIAGNOSIS — R10.9 ABDOMINAL PAIN, UNSPECIFIED ABDOMINAL LOCATION: ICD-10-CM

## 2024-02-24 DIAGNOSIS — E86.0 DEHYDRATION: ICD-10-CM

## 2024-02-24 DIAGNOSIS — K92.0 HEMATEMESIS WITH NAUSEA: ICD-10-CM

## 2024-02-24 DIAGNOSIS — K20.90 ESOPHAGITIS: ICD-10-CM

## 2024-02-24 DIAGNOSIS — E11.10 DIABETIC KETOACIDOSIS WITHOUT COMA ASSOCIATED WITH TYPE 2 DIABETES MELLITUS (HCC): ICD-10-CM

## 2024-02-24 PROBLEM — E87.29 HIGH ANION GAP METABOLIC ACIDOSIS: Status: ACTIVE | Noted: 2024-02-24

## 2024-02-24 PROBLEM — E13.10 DIABETIC KETOSIS (HCC): Status: ACTIVE | Noted: 2024-02-24

## 2024-02-24 PROBLEM — E11.65 TYPE 2 DIABETES MELLITUS WITH HYPERGLYCEMIA, WITHOUT LONG-TERM CURRENT USE OF INSULIN (HCC): Status: ACTIVE | Noted: 2024-02-24

## 2024-02-24 LAB
ABO + RH BLD: NORMAL
ABO GROUP BLD: NORMAL
ALBUMIN SERPL BCP-MCNC: 5 G/DL (ref 3.2–4.9)
ALBUMIN/GLOB SERPL: 1.7 G/DL
ALP SERPL-CCNC: 53 U/L (ref 30–99)
ALT SERPL-CCNC: 22 U/L (ref 2–50)
ANION GAP SERPL CALC-SCNC: 19 MMOL/L (ref 7–16)
ANION GAP SERPL CALC-SCNC: 30 MMOL/L (ref 7–16)
APPEARANCE UR: CLEAR
AST SERPL-CCNC: 21 U/L (ref 12–45)
B-OH-BUTYR SERPL-MCNC: 5.34 MMOL/L (ref 0.02–0.27)
BACTERIA #/AREA URNS HPF: NEGATIVE /HPF
BASE EXCESS BLDV CALC-SCNC: -8 MMOL/L
BASOPHILS # BLD AUTO: 0.1 % (ref 0–1.8)
BASOPHILS # BLD: 0.02 K/UL (ref 0–0.12)
BILIRUB SERPL-MCNC: 0.6 MG/DL (ref 0.1–1.5)
BILIRUB UR QL STRIP.AUTO: NEGATIVE
BLD GP AB SCN SERPL QL: NORMAL
BODY TEMPERATURE: 36.3 CENTIGRADE
BUN SERPL-MCNC: 19 MG/DL (ref 8–22)
BUN SERPL-MCNC: 21 MG/DL (ref 8–22)
CALCIUM ALBUM COR SERPL-MCNC: 9.5 MG/DL (ref 8.5–10.5)
CALCIUM SERPL-MCNC: 10.3 MG/DL (ref 8.5–10.5)
CALCIUM SERPL-MCNC: 9.4 MG/DL (ref 8.5–10.5)
CHLORIDE SERPL-SCNC: 104 MMOL/L (ref 96–112)
CHLORIDE SERPL-SCNC: 96 MMOL/L (ref 96–112)
CO2 SERPL-SCNC: 15 MMOL/L (ref 20–33)
CO2 SERPL-SCNC: 16 MMOL/L (ref 20–33)
COLOR UR: YELLOW
CREAT SERPL-MCNC: 1.14 MG/DL (ref 0.5–1.4)
CREAT SERPL-MCNC: 1.21 MG/DL (ref 0.5–1.4)
EKG IMPRESSION: NORMAL
EOSINOPHIL # BLD AUTO: 0 K/UL (ref 0–0.51)
EOSINOPHIL NFR BLD: 0 % (ref 0–6.9)
EPI CELLS #/AREA URNS HPF: NEGATIVE /HPF
ERYTHROCYTE [DISTWIDTH] IN BLOOD BY AUTOMATED COUNT: 42.5 FL (ref 35.9–50)
ERYTHROCYTE [DISTWIDTH] IN BLOOD BY AUTOMATED COUNT: 43 FL (ref 35.9–50)
GFR SERPLBLD CREATININE-BSD FMLA CKD-EPI: 77 ML/MIN/1.73 M 2
GFR SERPLBLD CREATININE-BSD FMLA CKD-EPI: 83 ML/MIN/1.73 M 2
GLOBULIN SER CALC-MCNC: 2.9 G/DL (ref 1.9–3.5)
GLUCOSE BLD STRIP.AUTO-MCNC: 147 MG/DL (ref 65–99)
GLUCOSE BLD STRIP.AUTO-MCNC: 213 MG/DL (ref 65–99)
GLUCOSE BLD STRIP.AUTO-MCNC: 222 MG/DL (ref 65–99)
GLUCOSE BLD STRIP.AUTO-MCNC: 259 MG/DL (ref 65–99)
GLUCOSE SERPL-MCNC: 161 MG/DL (ref 65–99)
GLUCOSE SERPL-MCNC: 241 MG/DL (ref 65–99)
GLUCOSE UR STRIP.AUTO-MCNC: >=1000 MG/DL
HCO3 BLDV-SCNC: 14 MMOL/L (ref 24–28)
HCT VFR BLD AUTO: 44.7 % (ref 42–52)
HCT VFR BLD AUTO: 48.4 % (ref 42–52)
HGB BLD-MCNC: 15.3 G/DL (ref 14–18)
HGB BLD-MCNC: 16.6 G/DL (ref 14–18)
HYALINE CASTS #/AREA URNS LPF: ABNORMAL /LPF
IMM GRANULOCYTES # BLD AUTO: 0.07 K/UL (ref 0–0.11)
IMM GRANULOCYTES NFR BLD AUTO: 0.5 % (ref 0–0.9)
INHALED O2 FLOW RATE: ABNORMAL L/MIN
KETONES UR STRIP.AUTO-MCNC: >=160 MG/DL
LACTATE SERPL-SCNC: 2 MMOL/L (ref 0.5–2)
LACTATE SERPL-SCNC: 2.2 MMOL/L (ref 0.5–2)
LEUKOCYTE ESTERASE UR QL STRIP.AUTO: NEGATIVE
LIPASE SERPL-CCNC: 14 U/L (ref 11–82)
LYMPHOCYTES # BLD AUTO: 0.64 K/UL (ref 1–4.8)
LYMPHOCYTES NFR BLD: 4.4 % (ref 22–41)
MAGNESIUM SERPL-MCNC: 1.7 MG/DL (ref 1.5–2.5)
MCH RBC QN AUTO: 30.7 PG (ref 27–33)
MCH RBC QN AUTO: 30.8 PG (ref 27–33)
MCHC RBC AUTO-ENTMCNC: 34.2 G/DL (ref 32.3–36.5)
MCHC RBC AUTO-ENTMCNC: 34.3 G/DL (ref 32.3–36.5)
MCV RBC AUTO: 89.5 FL (ref 81.4–97.8)
MCV RBC AUTO: 89.9 FL (ref 81.4–97.8)
MICRO URNS: ABNORMAL
MONOCYTES # BLD AUTO: 0.71 K/UL (ref 0–0.85)
MONOCYTES NFR BLD AUTO: 4.9 % (ref 0–13.4)
NEUTROPHILS # BLD AUTO: 12.99 K/UL (ref 1.82–7.42)
NEUTROPHILS NFR BLD: 90.1 % (ref 44–72)
NITRITE UR QL STRIP.AUTO: NEGATIVE
NRBC # BLD AUTO: 0 K/UL
NRBC BLD-RTO: 0 /100 WBC (ref 0–0.2)
OSMOLALITY SERPL: 308 MOSM/KG H2O (ref 278–298)
PCO2 BLDV: 22 MMHG (ref 41–51)
PCO2 TEMP ADJ BLDV: 21.3 MMHG (ref 41–51)
PH BLDV: 7.42 [PH] (ref 7.31–7.45)
PH TEMP ADJ BLDV: 7.43 [PH] (ref 7.31–7.45)
PH UR STRIP.AUTO: 5.5 [PH] (ref 5–8)
PHOSPHATE SERPL-MCNC: 5 MG/DL (ref 2.5–4.5)
PLATELET # BLD AUTO: 198 K/UL (ref 164–446)
PLATELET # BLD AUTO: 227 K/UL (ref 164–446)
PMV BLD AUTO: 10 FL (ref 9–12.9)
PMV BLD AUTO: 9.8 FL (ref 9–12.9)
PO2 BLDV: 42.7 MMHG (ref 25–40)
PO2 TEMP ADJ BLDV: 40.6 MMHG (ref 25–40)
POTASSIUM SERPL-SCNC: 4.1 MMOL/L (ref 3.6–5.5)
POTASSIUM SERPL-SCNC: 4.6 MMOL/L (ref 3.6–5.5)
PROT SERPL-MCNC: 7.9 G/DL (ref 6–8.2)
PROT UR QL STRIP: 100 MG/DL
RBC # BLD AUTO: 4.97 M/UL (ref 4.7–6.1)
RBC # BLD AUTO: 5.41 M/UL (ref 4.7–6.1)
RBC # URNS HPF: ABNORMAL /HPF
RBC UR QL AUTO: ABNORMAL
RH BLD: NORMAL
SAO2 % BLDV: 77.8 %
SODIUM SERPL-SCNC: 139 MMOL/L (ref 135–145)
SODIUM SERPL-SCNC: 141 MMOL/L (ref 135–145)
SP GR UR STRIP.AUTO: >=1.045
TROPONIN T SERPL-MCNC: 13 NG/L (ref 6–19)
TROPONIN T SERPL-MCNC: 18 NG/L (ref 6–19)
UROBILINOGEN UR STRIP.AUTO-MCNC: 0.2 MG/DL
WBC # BLD AUTO: 14.4 K/UL (ref 4.8–10.8)
WBC # BLD AUTO: 15.3 K/UL (ref 4.8–10.8)
WBC #/AREA URNS HPF: ABNORMAL /HPF

## 2024-02-24 PROCEDURE — 700117 HCHG RX CONTRAST REV CODE 255: Performed by: EMERGENCY MEDICINE

## 2024-02-24 PROCEDURE — A9270 NON-COVERED ITEM OR SERVICE: HCPCS | Performed by: HOSPITALIST

## 2024-02-24 PROCEDURE — 700111 HCHG RX REV CODE 636 W/ 250 OVERRIDE (IP): Performed by: HOSPITALIST

## 2024-02-24 PROCEDURE — 85025 COMPLETE CBC W/AUTO DIFF WBC: CPT

## 2024-02-24 PROCEDURE — 83735 ASSAY OF MAGNESIUM: CPT

## 2024-02-24 PROCEDURE — 86850 RBC ANTIBODY SCREEN: CPT

## 2024-02-24 PROCEDURE — 84484 ASSAY OF TROPONIN QUANT: CPT

## 2024-02-24 PROCEDURE — 80053 COMPREHEN METABOLIC PANEL: CPT

## 2024-02-24 PROCEDURE — 83690 ASSAY OF LIPASE: CPT

## 2024-02-24 PROCEDURE — 770020 HCHG ROOM/CARE - TELE (206)

## 2024-02-24 PROCEDURE — 80048 BASIC METABOLIC PNL TOTAL CA: CPT

## 2024-02-24 PROCEDURE — 700111 HCHG RX REV CODE 636 W/ 250 OVERRIDE (IP): Mod: JZ | Performed by: EMERGENCY MEDICINE

## 2024-02-24 PROCEDURE — 93005 ELECTROCARDIOGRAM TRACING: CPT | Performed by: EMERGENCY MEDICINE

## 2024-02-24 PROCEDURE — 99223 1ST HOSP IP/OBS HIGH 75: CPT | Performed by: HOSPITALIST

## 2024-02-24 PROCEDURE — C9113 INJ PANTOPRAZOLE SODIUM, VIA: HCPCS | Performed by: HOSPITALIST

## 2024-02-24 PROCEDURE — 74177 CT ABD & PELVIS W/CONTRAST: CPT

## 2024-02-24 PROCEDURE — 86900 BLOOD TYPING SEROLOGIC ABO: CPT

## 2024-02-24 PROCEDURE — 83930 ASSAY OF BLOOD OSMOLALITY: CPT

## 2024-02-24 PROCEDURE — 82803 BLOOD GASES ANY COMBINATION: CPT

## 2024-02-24 PROCEDURE — 99285 EMERGENCY DEPT VISIT HI MDM: CPT

## 2024-02-24 PROCEDURE — 83605 ASSAY OF LACTIC ACID: CPT

## 2024-02-24 PROCEDURE — 85027 COMPLETE CBC AUTOMATED: CPT

## 2024-02-24 PROCEDURE — 82010 KETONE BODYS QUAN: CPT

## 2024-02-24 PROCEDURE — 700105 HCHG RX REV CODE 258: Performed by: EMERGENCY MEDICINE

## 2024-02-24 PROCEDURE — 36415 COLL VENOUS BLD VENIPUNCTURE: CPT

## 2024-02-24 PROCEDURE — 700102 HCHG RX REV CODE 250 W/ 637 OVERRIDE(OP): Performed by: HOSPITALIST

## 2024-02-24 PROCEDURE — 96374 THER/PROPH/DIAG INJ IV PUSH: CPT

## 2024-02-24 PROCEDURE — 82962 GLUCOSE BLOOD TEST: CPT

## 2024-02-24 PROCEDURE — 81001 URINALYSIS AUTO W/SCOPE: CPT

## 2024-02-24 PROCEDURE — 700105 HCHG RX REV CODE 258: Performed by: HOSPITALIST

## 2024-02-24 PROCEDURE — 71045 X-RAY EXAM CHEST 1 VIEW: CPT

## 2024-02-24 PROCEDURE — 84100 ASSAY OF PHOSPHORUS: CPT

## 2024-02-24 PROCEDURE — 86901 BLOOD TYPING SEROLOGIC RH(D): CPT

## 2024-02-24 PROCEDURE — 94760 N-INVAS EAR/PLS OXIMETRY 1: CPT

## 2024-02-24 PROCEDURE — 96375 TX/PRO/DX INJ NEW DRUG ADDON: CPT

## 2024-02-24 RX ORDER — POLYETHYLENE GLYCOL 3350 17 G/17G
1 POWDER, FOR SOLUTION ORAL
Status: DISCONTINUED | OUTPATIENT
Start: 2024-02-24 | End: 2024-02-28 | Stop reason: HOSPADM

## 2024-02-24 RX ORDER — INSULIN LISPRO 100 [IU]/ML
1-6 INJECTION, SOLUTION INTRAVENOUS; SUBCUTANEOUS
Status: DISCONTINUED | OUTPATIENT
Start: 2024-02-24 | End: 2024-02-28 | Stop reason: HOSPADM

## 2024-02-24 RX ORDER — SUCRALFATE ORAL 1 G/10ML
1 SUSPENSION ORAL EVERY 6 HOURS
Status: DISCONTINUED | OUTPATIENT
Start: 2024-02-24 | End: 2024-02-28 | Stop reason: HOSPADM

## 2024-02-24 RX ORDER — LEVOCETIRIZINE DIHYDROCHLORIDE 5 MG/1
5 TABLET, FILM COATED ORAL DAILY
COMMUNITY

## 2024-02-24 RX ORDER — PANTOPRAZOLE SODIUM 40 MG/10ML
40 INJECTION, POWDER, LYOPHILIZED, FOR SOLUTION INTRAVENOUS 2 TIMES DAILY
Status: DISCONTINUED | OUTPATIENT
Start: 2024-02-24 | End: 2024-02-27

## 2024-02-24 RX ORDER — SODIUM CHLORIDE 9 MG/ML
30 INJECTION, SOLUTION INTRAVENOUS
Status: DISCONTINUED | OUTPATIENT
Start: 2024-02-24 | End: 2024-02-27

## 2024-02-24 RX ORDER — AMOXICILLIN 250 MG
2 CAPSULE ORAL 2 TIMES DAILY
Status: DISCONTINUED | OUTPATIENT
Start: 2024-02-24 | End: 2024-02-28 | Stop reason: HOSPADM

## 2024-02-24 RX ORDER — DEXTROSE MONOHYDRATE 25 G/50ML
25 INJECTION, SOLUTION INTRAVENOUS
Status: DISCONTINUED | OUTPATIENT
Start: 2024-02-24 | End: 2024-02-28 | Stop reason: HOSPADM

## 2024-02-24 RX ORDER — PERPHENAZINE 16 MG
600 TABLET ORAL DAILY
COMMUNITY

## 2024-02-24 RX ORDER — SODIUM CHLORIDE, SODIUM LACTATE, POTASSIUM CHLORIDE, CALCIUM CHLORIDE 600; 310; 30; 20 MG/100ML; MG/100ML; MG/100ML; MG/100ML
1000 INJECTION, SOLUTION INTRAVENOUS ONCE
Status: COMPLETED | OUTPATIENT
Start: 2024-02-24 | End: 2024-02-24

## 2024-02-24 RX ORDER — HYDROMORPHONE HYDROCHLORIDE 1 MG/ML
0.5 INJECTION, SOLUTION INTRAMUSCULAR; INTRAVENOUS; SUBCUTANEOUS ONCE
Status: COMPLETED | OUTPATIENT
Start: 2024-02-24 | End: 2024-02-24

## 2024-02-24 RX ORDER — PROMETHAZINE HYDROCHLORIDE 25 MG/1
12.5-25 SUPPOSITORY RECTAL EVERY 4 HOURS PRN
Status: DISCONTINUED | OUTPATIENT
Start: 2024-02-24 | End: 2024-02-28 | Stop reason: HOSPADM

## 2024-02-24 RX ORDER — PRASTERONE (DHEA) 50 MG
100 CAPSULE ORAL DAILY
COMMUNITY

## 2024-02-24 RX ORDER — ONDANSETRON 2 MG/ML
4 INJECTION INTRAMUSCULAR; INTRAVENOUS EVERY 4 HOURS PRN
Status: DISCONTINUED | OUTPATIENT
Start: 2024-02-24 | End: 2024-02-28 | Stop reason: HOSPADM

## 2024-02-24 RX ORDER — ONDANSETRON 2 MG/ML
4 INJECTION INTRAMUSCULAR; INTRAVENOUS ONCE
Status: COMPLETED | OUTPATIENT
Start: 2024-02-24 | End: 2024-02-24

## 2024-02-24 RX ORDER — ONDANSETRON 4 MG/1
4 TABLET, ORALLY DISINTEGRATING ORAL EVERY 4 HOURS PRN
Status: DISCONTINUED | OUTPATIENT
Start: 2024-02-24 | End: 2024-02-28 | Stop reason: HOSPADM

## 2024-02-24 RX ORDER — LABETALOL HYDROCHLORIDE 5 MG/ML
10 INJECTION, SOLUTION INTRAVENOUS EVERY 4 HOURS PRN
Status: DISCONTINUED | OUTPATIENT
Start: 2024-02-24 | End: 2024-02-28 | Stop reason: HOSPADM

## 2024-02-24 RX ORDER — SODIUM CHLORIDE, SODIUM LACTATE, POTASSIUM CHLORIDE, AND CALCIUM CHLORIDE .6; .31; .03; .02 G/100ML; G/100ML; G/100ML; G/100ML
500 INJECTION, SOLUTION INTRAVENOUS
Status: DISCONTINUED | OUTPATIENT
Start: 2024-02-24 | End: 2024-02-27

## 2024-02-24 RX ORDER — SODIUM CHLORIDE 9 MG/ML
INJECTION, SOLUTION INTRAVENOUS CONTINUOUS
Status: DISCONTINUED | OUTPATIENT
Start: 2024-02-24 | End: 2024-02-27

## 2024-02-24 RX ORDER — PROCHLORPERAZINE EDISYLATE 5 MG/ML
5-10 INJECTION INTRAMUSCULAR; INTRAVENOUS EVERY 4 HOURS PRN
Status: DISCONTINUED | OUTPATIENT
Start: 2024-02-24 | End: 2024-02-28 | Stop reason: HOSPADM

## 2024-02-24 RX ORDER — PROMETHAZINE HYDROCHLORIDE 25 MG/1
12.5-25 TABLET ORAL EVERY 4 HOURS PRN
Status: DISCONTINUED | OUTPATIENT
Start: 2024-02-24 | End: 2024-02-28 | Stop reason: HOSPADM

## 2024-02-24 RX ORDER — ACETAMINOPHEN 325 MG/1
650 TABLET ORAL EVERY 6 HOURS PRN
Status: DISCONTINUED | OUTPATIENT
Start: 2024-02-24 | End: 2024-02-27

## 2024-02-24 RX ORDER — MORPHINE SULFATE 4 MG/ML
4 INJECTION INTRAVENOUS ONCE
Status: COMPLETED | OUTPATIENT
Start: 2024-02-24 | End: 2024-02-24

## 2024-02-24 RX ADMIN — LABETALOL HYDROCHLORIDE 10 MG: 5 INJECTION, SOLUTION INTRAVENOUS at 15:48

## 2024-02-24 RX ADMIN — INSULIN GLARGINE-YFGN 6 UNITS: 100 INJECTION, SOLUTION SUBCUTANEOUS at 18:16

## 2024-02-24 RX ADMIN — MORPHINE SULFATE 4 MG: 4 INJECTION, SOLUTION INTRAMUSCULAR; INTRAVENOUS at 09:35

## 2024-02-24 RX ADMIN — ONDANSETRON 4 MG: 2 INJECTION INTRAMUSCULAR; INTRAVENOUS at 09:35

## 2024-02-24 RX ADMIN — ONDANSETRON 4 MG: 4 TABLET, ORALLY DISINTEGRATING ORAL at 12:45

## 2024-02-24 RX ADMIN — IOHEXOL 95 ML: 350 INJECTION, SOLUTION INTRAVENOUS at 10:30

## 2024-02-24 RX ADMIN — PROCHLORPERAZINE EDISYLATE 5 MG: 5 INJECTION INTRAMUSCULAR; INTRAVENOUS at 15:45

## 2024-02-24 RX ADMIN — INSULIN GLARGINE-YFGN 6 UNITS: 100 INJECTION, SOLUTION SUBCUTANEOUS at 12:54

## 2024-02-24 RX ADMIN — PANTOPRAZOLE SODIUM 40 MG: 40 INJECTION, POWDER, FOR SOLUTION INTRAVENOUS at 12:46

## 2024-02-24 RX ADMIN — INSULIN LISPRO 2 UNITS: 100 INJECTION, SOLUTION INTRAVENOUS; SUBCUTANEOUS at 18:17

## 2024-02-24 RX ADMIN — SUCRALFATE 1 G: 1 SUSPENSION ORAL at 18:03

## 2024-02-24 RX ADMIN — SODIUM CHLORIDE: 9 INJECTION, SOLUTION INTRAVENOUS at 13:02

## 2024-02-24 RX ADMIN — SUCRALFATE 1 G: 1 SUSPENSION ORAL at 23:42

## 2024-02-24 RX ADMIN — HYDROMORPHONE HYDROCHLORIDE 0.5 MG: 1 INJECTION, SOLUTION INTRAMUSCULAR; INTRAVENOUS; SUBCUTANEOUS at 10:49

## 2024-02-24 RX ADMIN — SODIUM CHLORIDE, POTASSIUM CHLORIDE, SODIUM LACTATE AND CALCIUM CHLORIDE 1000 ML: 600; 310; 30; 20 INJECTION, SOLUTION INTRAVENOUS at 09:35

## 2024-02-24 RX ADMIN — SUCRALFATE 1 G: 1 SUSPENSION ORAL at 12:46

## 2024-02-24 RX ADMIN — PANTOPRAZOLE SODIUM 40 MG: 40 INJECTION, POWDER, FOR SOLUTION INTRAVENOUS at 18:04

## 2024-02-24 ASSESSMENT — ENCOUNTER SYMPTOMS
ABDOMINAL PAIN: 1
SHORTNESS OF BREATH: 0
EYE REDNESS: 0
FOCAL WEAKNESS: 0
STRIDOR: 0
EYE DISCHARGE: 0
NAUSEA: 1
VOMITING: 1
MYALGIAS: 0
BRUISES/BLEEDS EASILY: 0
FEVER: 0
FLANK PAIN: 0
ROS GI COMMENTS: HEMATEMESIS
COUGH: 0
NERVOUS/ANXIOUS: 0
CHILLS: 0

## 2024-02-24 ASSESSMENT — LIFESTYLE VARIABLES
EVER HAD A DRINK FIRST THING IN THE MORNING TO STEADY YOUR NERVES TO GET RID OF A HANGOVER: NO
HAVE YOU EVER FELT YOU SHOULD CUT DOWN ON YOUR DRINKING: NO
ALCOHOL_USE: YES
AVERAGE NUMBER OF DAYS PER WEEK YOU HAVE A DRINK CONTAINING ALCOHOL: 1
TOTAL SCORE: 0
HAVE PEOPLE ANNOYED YOU BY CRITICIZING YOUR DRINKING: NO
HOW MANY TIMES IN THE PAST YEAR HAVE YOU HAD 5 OR MORE DRINKS IN A DAY: 0
TOTAL SCORE: 0
DOES PATIENT WANT TO STOP DRINKING: NO
ON A TYPICAL DAY WHEN YOU DRINK ALCOHOL HOW MANY DRINKS DO YOU HAVE: 1
CONSUMPTION TOTAL: NEGATIVE
TOTAL SCORE: 0
EVER FELT BAD OR GUILTY ABOUT YOUR DRINKING: NO

## 2024-02-24 ASSESSMENT — PAIN DESCRIPTION - PAIN TYPE: TYPE: ACUTE PAIN

## 2024-02-24 ASSESSMENT — COGNITIVE AND FUNCTIONAL STATUS - GENERAL
DAILY ACTIVITIY SCORE: 24
MOBILITY SCORE: 22
SUGGESTED CMS G CODE MODIFIER MOBILITY: CJ
WALKING IN HOSPITAL ROOM: A LITTLE
SUGGESTED CMS G CODE MODIFIER DAILY ACTIVITY: CH
MOVING FROM LYING ON BACK TO SITTING ON SIDE OF FLAT BED: A LITTLE

## 2024-02-24 ASSESSMENT — FIBROSIS 4 INDEX
FIB4 SCORE: 0.81
FIB4 SCORE: 0.83

## 2024-02-24 ASSESSMENT — PATIENT HEALTH QUESTIONNAIRE - PHQ9
1. LITTLE INTEREST OR PLEASURE IN DOING THINGS: NOT AT ALL
SUM OF ALL RESPONSES TO PHQ9 QUESTIONS 1 AND 2: 0
2. FEELING DOWN, DEPRESSED, IRRITABLE, OR HOPELESS: NOT AT ALL

## 2024-02-24 NOTE — H&P
Hospital Medicine History & Physical Note    Date of Service  2/24/2024    Primary Care Physician  Amanda Alvarenga M.D.    Consultants  Intensive care, Dr Beth       Code Status  Full Code    Chief Complaint  Chief Complaint   Patient presents with    N/V     Since yesterday afternoon. Pt c/o red and brown emesis. Pt states is started with abdominal pain then progressed to vomiting.      History of Presenting Illness  Jose De Jesus Banda is a 43 y.o. male with a past medical history of type 2 diabetes who presented 2/24/2024 with nausea, vomiting and abdominal pain.  The patient reports that has not been feeling well since yesterday.  Has been having progressively worsening generalized weakness, abdominal pain nausea and vomiting.  He denies noticing any fevers or chills.  Initial episodes of vomiting was nonbloody however most recent episode had blood/coffee-ground emesis.  He denies noticing any blood in his stool.      I discussed the plan of care with emergency physician, the patient and patient family present at bedside in the emergency room.    Review of Systems  Review of Systems   Constitutional:  Positive for malaise/fatigue. Negative for chills and fever.   Eyes:  Negative for discharge and redness.   Respiratory:  Negative for cough, shortness of breath and stridor.    Cardiovascular:  Negative for chest pain and leg swelling.   Gastrointestinal:  Positive for abdominal pain, nausea and vomiting.        Hematemesis   Genitourinary:  Negative for flank pain.   Musculoskeletal:  Negative for myalgias.   Skin: Negative.    Neurological:  Negative for focal weakness.   Endo/Heme/Allergies:  Does not bruise/bleed easily.   Psychiatric/Behavioral:  The patient is not nervous/anxious.      Past Medical History   has a past medical history of Allergy, Diabetes (HCC) (05/2018), Infectious disease (08/2019), and Pain (08/2019).    Surgical History   has a past surgical history that includes colonoscopy (01/2019);  pr lennox irvinr jt w anesthesia (Right, 8/22/2019); pr efraín arthroscop,part acromioplas (Right, 8/22/2019); and shoulder arthroscopy (Right, 8/22/2019).     Family History  family history includes Arthritis in his mother; Diabetes in his father and mother; Hypertension in his father; Lung Cancer in his maternal grandmother and paternal grandfather; Lung Disease in his father; Stroke in his mother.      Social History   reports that he has never smoked. He has never used smokeless tobacco. He reports current alcohol use. He reports that he does not use drugs.    Allergies  No Known Allergies    Medications  Prior to Admission Medications   Prescriptions Last Dose Informant Patient Reported? Taking?   DHEA 50 MG Cap 2/23/2024 at AM Patient Yes Yes   Sig: Take 100 mg by mouth every day.   Levocetirizine Dihydrochloride (XYZAL ALLERGY 24HR) 5 MG Tab 2/22/2024 at PM Patient Yes Yes   Sig: Take 5 mg by mouth every day.   MILK THISTLE PLUS PO 2/23/2024 at AM Patient Yes Yes   Sig: Take 1 Tablet by mouth every day.   Multiple Vitamin (MULTIVITAMIN ADULT PO) 2/22/2024 at [facility] Patient Yes No   Sig: Take 1 Tablet by mouth every day.   NON SPECIFIED 2/23/2024 at AM Patient Yes Yes   Sig: Take 1 Tablet by mouth every day. Nitric Oxide OTC Supplement   OZEMPIC, 0.25 OR 0.5 MG/DOSE, 2 MG/3ML Solution Pen-injector 2/19/2024 at [facility] Patient No No   Sig: INJECT 0.5MG UNDER THE SKIN EVERY 7 DAYS   S-Adenosylmethionine (THEODORA-E PO) 2/22/2024 at [facility] Patient Yes No   Sig: Take 1 Tablet by mouth every day.   SYNJARDY XR 12.5-1000 MG TABLET SR 24 HR 2/23/2024 at AM Patient No Yes   Sig: TAKE 2 TABLETS BY MOUTH EVERY DAY   Vitamin D3 5000 Unit (125 mcg) Tab 2/23/2024 at AM Patient Yes Yes   Sig: Take 5,000 Units by mouth every day.   alpha-lipoic acid 600 MG Cap 2/22/2024 at [facility] Patient Yes Yes   Sig: Take 600 mg by mouth every day at 6 PM.   fluticasone (FLONASE) 50 MCG/ACT nasal spray [facility] Patient Yes No   Sig: Spray 1 Spray in  nose every day.   glimepiride (AMARYL) 4 MG Tab 2/22/2024 at UNK Patient No No   Sig: TAKE 1 TABLET BY MOUTH BEFORE EVENING MEAL.   glycopyrrolate (ROBINUL) 1 MG Tab 2/23/2024 at AFTERNOON Patient No Yes   Sig: TAKE 1 TABLET BY MOUTH THREE TIMES A DAY   Patient taking differently: Take 1 mg by mouth 3 times a day.   pioglitazone (ACTOS) 30 MG Tab 2/22/2024 at UNK Patient No No   Sig: TAKE 1 TABLET BY MOUTH EVERY DAY      Facility-Administered Medications: None     Physical Exam  Temp:  [36.1 °C (97 °F)-37 °C (98.6 °F)] 37 °C (98.6 °F)  Pulse:  [] 116  Resp:  [15-25] 22  BP: (152-170)/() 157/106  SpO2:  [99 %-100 %] 100 %  Blood Pressure: (!) 158/87   Temperature: 36.1 °C (97 °F)   Pulse: (!) 102   Respiration: 18   Pulse Oximetry: 100 %     Physical Exam  Constitutional:       General: He is not in acute distress.     Appearance: He is ill-appearing and diaphoretic.   HENT:      Head: Atraumatic.      Right Ear: External ear normal.      Left Ear: External ear normal.      Nose: No congestion or rhinorrhea.      Mouth/Throat:      Mouth: Mucous membranes are dry.   Eyes:      General: No scleral icterus.        Right eye: No discharge.         Left eye: No discharge.      Pupils: Pupils are equal, round, and reactive to light.   Cardiovascular:      Rate and Rhythm: Regular rhythm. Tachycardia present.   Pulmonary:      Effort: Pulmonary effort is normal.   Abdominal:      General: There is no distension.   Musculoskeletal:      Cervical back: Neck supple. No rigidity. No muscular tenderness.      Right lower leg: No edema.      Left lower leg: No edema.   Skin:     Capillary Refill: Capillary refill takes 2 to 3 seconds.      Coloration: Skin is pale. Skin is not jaundiced.   Neurological:      Mental Status: He is alert and oriented to person, place, and time.      Coordination: Coordination normal.   Psychiatric:         Mood and Affect: Mood normal.         Behavior: Behavior normal.  "      Laboratory:  Recent Labs     02/24/24  0847 02/24/24  1507   WBC 14.4* 15.3*   RBC 5.41 4.97   HEMOGLOBIN 16.6 15.3   HEMATOCRIT 48.4 44.7   MCV 89.5 89.9   MCH 30.7 30.8   MCHC 34.3 34.2   RDW 42.5 43.0   PLATELETCT 227 198   MPV 10.0 9.8     Recent Labs     02/24/24  0847   SODIUM 141   POTASSIUM 4.1   CHLORIDE 96   CO2 15*   GLUCOSE 241*   BUN 19   CREATININE 1.21   CALCIUM 10.3     Recent Labs     02/24/24  0847   ALTSGPT 22   ASTSGOT 21   ALKPHOSPHAT 53   TBILIRUBIN 0.6   LIPASE 14   GLUCOSE 241*         No results for input(s): \"NTPROBNP\" in the last 72 hours.      Recent Labs     02/24/24  0847   TROPONINT 13     Imaging:  CT-ABDOMEN-PELVIS WITH   Final Result      1.  Mild wall thickening and edema in the distal esophagus.      2.  Otherwise no acute abnormalities are identified in the abdomen or pelvis.      DX-CHEST-PORTABLE (1 VIEW)   Final Result         No acute cardiac or pulmonary abnormality is identified.      EC-ECHOCARDIOGRAM COMPLETE W/O CONT    (Results Pending)     I personally reviewed patient EKG shows sinus tachycardia with a rate of 99, there is no ST elevation, there is ST depression in leads II, 3, aVF, leads V3-V6.    Assessment/Plan:  Justification for Admission Status  I anticipate this patient will require at least two midnights for appropriate medical management, necessitating inpatient admission because the patient has multiple acute medical problems including esophagitis, intractable vomiting, severe dehydration, hyperglycemia, diabetic ketosis.  Will require intravenous fluids, close monitoring of electrolytes and blood sugars.  In addition to trending his troponins.    Patient will need a Telemetry bed on MEDICAL service.  The patient has abnormal EKG with ST depression concerning for ischemia.    * Diabetic ketosis (HCC)- (present on admission)  Assessment & Plan  Has hyperglycemia and ketosis without acidosis  Blood pH is within normal limits despite the low bicarb level. "  This is likely due to mixed metabolic alkalosis due to excessive vomiting.  Last glycated hemoglobin was 7%  I will start long & short acting insulin for now  I will order Accu-Checks, hypoglycemia protocol  Adjust according to blood sugars trend     Hematemesis- (present on admission)  Assessment & Plan  Likely secondary to esophagitis  CT imaging show evidence for edema in the lower esophagus  I will start intravenous pantoprazole.  I will start sucralfate  Insert to wide pore peripheral IV accesses    I will order intravenous fluids   I will order H&H to be checked every 8 hours  Transfuse for Hb <7     Abnormal EKG- (present on admission)  Assessment & Plan  EKG shows sinus tachycardia with a rate of 99, there is no ST elevation, there is ST depression in leads II, 3, aVF, leads V3-V6.    I will place on continuous cardiac monitoring  I will check and trend troponins  I will check echocardiography       Elevated blood pressure reading- (present on admission)  Assessment & Plan  No known history of primary hypertension  Likely secondary to severe pain  I will place on multimodal pain medications   I will start as needed labetalol for extreme hypertension  Consider starting scheduled antihypertensive medications according to blood pressure trend     High anion gap metabolic disturbance- (present on admission)  Assessment & Plan  Blood pH is within normal limits despite the low bicarb level.  This is likely due to mixed metabolic alkalosis due to excessive vomiting.  Anticipate improvement with intravenous fluids, insulin and antiemetics.  I will order follow-up BMP.    Esophagitis- (present on admission)  Assessment & Plan  CT imaging show evidence for edema in the lower esophagus  I will start intravenous pantoprazole.  I will start sucralfate    Type 2 diabetes mellitus with hyperglycemia, without long-term current use of insulin (HCC)- (present on admission)  Assessment & Plan  With hyperglycemia and ketosis  without acidosis  Last glycated hemoglobin was 7%  I will start long & short acting insulin for now  I will order Accu-Checks, hypoglycemia protocol  Adjust according to blood sugars trend     Dehydration- (present on admission)  Assessment & Plan  Likely secondary to reduced oral intake, increase in losses secondary to vomiting, and increase in insensible loss due to osmotic diuresis  Encourage oral intake as tolerated, antiemetics as needed.  Intravenous hydration until adequate oral intake is achieved.     Dyslipidemia- (present on admission)  Assessment & Plan  Cardiac diet when able to take orally      VTE prophylaxis: SCDs/TEDs

## 2024-02-24 NOTE — ASSESSMENT & PLAN NOTE
Likely secondary to reduced oral intake, increase in losses secondary to vomiting, and increase in insensible loss due to osmotic diuresis  Encourage oral intake as tolerated, antiemetics as needed.  Intravenous hydration until adequate oral intake is achieved.

## 2024-02-24 NOTE — PROGRESS NOTES
4 Eyes Skin Assessment Completed by BEATRIZ Henderson and LORRAINE Bearden.    Head WDL  Ears WDL  Nose WDL  Mouth WDL  Neck WDL  Breast/Chest WDL  Shoulder Blades WDL  Spine WDL  (R) Arm/Elbow/Hand WDL  (L) Arm/Elbow/Hand WDL  Abdomen WDL  Groin WDL  Scrotum/Coccyx/Buttocks WDL  (R) Leg WDL  (L) Leg WDL  (R) Heel/Foot/Toe WDL, Dry  (L) Heel/Foot/Toe WDL, Dry          Devices In Places Tele Box, Blood Pressure Cuff, Pulse Ox, and Nasal Cannula      Interventions In Place Gray Ear Foams and Pillows    Possible Skin Injury No    Pictures Uploaded Into Epic N/A  Wound Consult Placed N/A  RN Wound Prevention Protocol Ordered No

## 2024-02-24 NOTE — ED TRIAGE NOTES
"Chief Complaint   Patient presents with    N/V     Since yesterday afternoon. Pt c/o red and brown emesis. Pt states is started with abdominal pain then progressed to vomiting.      Pt ambulatory from lobby with steady gait.  Abdominal pain protocol ordered.  Pt appears to have brown emesis with red tinge in emesis bag.      Pt is alert and oriented, speaking in full sentences, follows commands and responds appropriately to questions. Resp are even and unlabored.      Pt placed in lobby. Pt educated on triage process. Pt encouraged to alert staff for any changes.     Patient and staff wearing appropriate PPE.    BP (!) 162/92   Pulse (!) 106   Temp 36.1 °C (97 °F) (Temporal)   Resp 18   Ht 1.854 m (6' 1\")   Wt 72.9 kg (160 lb 11.5 oz)   SpO2 99%      "

## 2024-02-24 NOTE — ED NOTES
Assist RN note:  Returned to room from CT. Having hiccupping and coffee ground emesis. Given water to rinse mouth.

## 2024-02-24 NOTE — ASSESSMENT & PLAN NOTE
EKG shows sinus tachycardia with a rate of 99, there is no ST elevation, there is ST depression in leads II, 3, aVF, leads V3-V6.    Troponin negative  Echo pending

## 2024-02-24 NOTE — ASSESSMENT & PLAN NOTE
Blood pH is within normal limits despite the low bicarb level.  This is likely due to mixed metabolic alkalosis due to excessive vomiting.  Improved with IVF

## 2024-02-24 NOTE — ED NOTES
Med Rec completed per patient and S/O   Allergies reviewed  No ORAL antibiotics in last 30 days

## 2024-02-24 NOTE — ED PROVIDER NOTES
ED Provider Note    CHIEF COMPLAINT  Chief Complaint   Patient presents with    N/V     Since yesterday afternoon. Pt c/o red and brown emesis. Pt states is started with abdominal pain then progressed to vomiting.        EXTERNAL RECORDS REVIEWED  Outpatient Notes the patient was seen at their primary care doctor's office on January 25, 2024, he is a non-insulin diabetic who takes Ozempic and glimepiride    HPI/ROS  LIMITATION TO HISTORY   Select: : None  OUTSIDE HISTORIAN(S):  Family    Jose De Jesus Banda is a 43 y.o. male who presents with past medical history significant for noncemented diabetes, he yesterday had lower abdominal pain and then started vomiting.  Now he has upper abdominal pain.  He states that he had blood in his vomit after vomiting multiple times.  He denies any diarrhea.  He reports he has never had DKA before.  He has never had symptoms like this before.    PAST MEDICAL HISTORY   has a past medical history of Allergy, Diabetes ([facility]) (05/2018), Infectious disease (08/2019), and Pain (08/2019).    SURGICAL HISTORY   has a past surgical history that includes colonoscopy (01/2019); manipulatn shldr jt w anesthesia (Right, 8/22/2019); shldr arthroscop,part acromioplas (Right, 8/22/2019); and shoulder arthroscopy (Right, 8/22/2019).    FAMILY HISTORY  Family History   Problem Relation Age of Onset    Arthritis Mother     Stroke Mother     Diabetes Mother     Hypertension Father     Diabetes Father     Lung Disease Father     Lung Cancer Maternal Grandmother     Lung Cancer Paternal Grandfather        SOCIAL HISTORY  Social History     Tobacco Use    Smoking status: Never    Smokeless tobacco: Never   Vaping Use    Vaping Use: Never used   Substance and Sexual Activity    Alcohol use: Yes     Comment: 6 per month    Drug use: No    Sexual activity: Yes     Partners: Female       CURRENT MEDICATIONS  Home Medications       Reviewed by Misa Wharton (Pharmacy Tech) on 02/24/24 at 1027  Med List  "Status: Complete     Medication Last Dose Status   alpha-lipoic acid 600 MG Cap 2/22/2024 Active   DHEA 50 MG Cap 2/23/2024 Active   fluticasone (FLONASE) 50 MCG/ACT nasal spray UNK Active   glimepiride (AMARYL) 4 MG Tab 2/22/2024 Active   glycopyrrolate (ROBINUL) 1 MG Tab 2/23/2024 Active   Levocetirizine Dihydrochloride (XYZAL ALLERGY 24HR) 5 MG Tab 2/22/2024 Active   MILK THISTLE PLUS PO 2/23/2024 Active   Multiple Vitamin (MULTIVITAMIN ADULT PO) 2/22/2024 Active   NON SPECIFIED 2/23/2024 Active   OZEMPIC, 0.25 OR 0.5 MG/DOSE, 2 MG/3ML Solution Pen-injector 2/19/2024 Active   pioglitazone (ACTOS) 30 MG Tab 2/22/2024 Active   S-Adenosylmethionine (THEODORA-E PO) 2/22/2024 Active   SYNJARDY XR 12.5-1000 MG TABLET SR 24 HR 2/23/2024 Active   Vitamin D3 5000 Unit (125 mcg) Tab 2/23/2024 Active                    ALLERGIES  No Known Allergies    PHYSICAL EXAM  VITAL SIGNS: BP (!) 158/87   Pulse (!) 102   Temp 36.1 °C (97 °F) (Temporal)   Resp 18   Ht 1.854 m (6' 1\")   Wt 72.9 kg (160 lb 11.5 oz)   SpO2 100%   BMI 21.20 kg/m²    Constitutional: Alert.  Diaphoretic.  HENT: No signs of trauma, Bilateral external ears normal, Nose normal.   Eyes: Pupils are equal and reactive, Conjunctiva normal, Non-icteric.   Neck: Normal range of motion, No tenderness, Supple, No stridor.   Lymphatic: No lymphadenopathy noted.   Cardiovascular: Regular rate and rhythm, no murmurs.   Thorax & Lungs: Normal breath sounds, No respiratory distress, No wheezing, No chest tenderness.   Abdomen: Bowel sounds normal, Soft, No tenderness, No peritoneal signs, No masses, No pulsatile masses.   Skin: Warm, diaphoretic, No erythema, No rash.   Back: No bony tenderness, No CVA tenderness.   Extremities: Intact distal pulses, No edema, No tenderness, No cyanosis.  Musculoskeletal: Good range of motion in all major joints. No tenderness to palpation or major deformities noted.   Neurologic: Alert , Normal motor function, Normal sensory function, No " focal deficits noted.   Psychiatric: Affect normal, Judgment normal, Mood normal.       DIAGNOSTIC STUDIES / PROCEDURES  EKG  I have independently interpreted this EKG  Sinus rhythm rate 99  Bilateral atrial enlargement  Borderline ST depression, anterolateral leads  axis normal  intervals normal  No previous ECG available for comparison  My impression of this EKG, nonspecific ST depression, does not meet STEMI criteria at this time    LABS  Labs Reviewed   CBC WITH DIFFERENTIAL - Abnormal; Notable for the following components:       Result Value    WBC 14.4 (*)     Neutrophils-Polys 90.10 (*)     Lymphocytes 4.40 (*)     Neutrophils (Absolute) 12.99 (*)     Lymphs (Absolute) 0.64 (*)     All other components within normal limits   COMP METABOLIC PANEL - Abnormal; Notable for the following components:    Co2 15 (*)     Anion Gap 30.0 (*)     Glucose 241 (*)     Albumin 5.0 (*)     All other components within normal limits   BETA-HYDROXYBUTYRIC ACID - Abnormal; Notable for the following components:    beta-Hydroxybutyric Acid 5.34 (*)     All other components within normal limits   OSMOLALITY SERUM - Abnormal; Notable for the following components:    Osmolality Serum 308 (*)     All other components within normal limits   VENOUS BLOOD GAS - Abnormal; Notable for the following components:    Venous Bg Pco2 22.0 (*)     Venous Bg Pco2 Temp Corrected 21.3 (*)     Venous Bg Po2 42.7 (*)     Venous Bg Po2 Temp Corrected 40.6 (*)     Venous Bg Hco3 14 (*)     All other components within normal limits   POCT GLUCOSE DEVICE RESULTS - Abnormal; Notable for the following components:    POC Glucose, Blood 259 (*)     All other components within normal limits   LIPASE   COD (ADULT)   ESTIMATED GFR   URINALYSIS   MAGNESIUM   PHOSPHORUS   TROPONIN   ABO RH CONFIRM           RADIOLOGY  I have independently interpreted the diagnostic imaging associated with this visit and am waiting the final reading from the radiologist.   My  preliminary interpretation is as follows: No free air on CT scan, chest x-ray negative  Radiologist interpretation:     CT-ABDOMEN-PELVIS WITH   Final Result      1.  Mild wall thickening and edema in the distal esophagus.      2.  Otherwise no acute abnormalities are identified in the abdomen or pelvis.      DX-CHEST-PORTABLE (1 VIEW)   Final Result         No acute cardiac or pulmonary abnormality is identified.            COURSE & MEDICAL DECISION MAKING    ED Observation Status? No; Patient does not meet criteria for ED Observation.     INITIAL ASSESSMENT, COURSE AND PLAN  Care Narrative: The patient presents with a history of diabetes, abdominal pain and vomiting.  Labs were ordered, CT scan was ordered, EKG was ordered, he is now pointing to his chest where he has pain after vomiting multiple times.  He may have a GI bleed although Selina-Abraham tear is more likely with delayed onset of bleeding after vomiting multiple times.  The patient was given 1 L LR IV, Zofran 4 mg IV, morphine 4 mg IV.      10:49 AM the patient's CO2 is low at 15, anion gap is elevated at 30.  His glucose is 259.  He has elevated beta hydroxybutyric acid 5.3.  His white blood count is elevated 14.  His troponin is pending.  CT scan shows distal esophageal changes.  His H&H is normal, he possibly has Selina-Abraham tear versus an esophagitis causing hematemesis.          HYDRATION: Based on the patient's presentation of Acute Vomiting the patient was given IV fluids. IV Hydration was used because oral hydration failed due to vomiting. Upon recheck following hydration, the patient was feeling slightly improved.      ADDITIONAL PROBLEM LIST  Non-insulin-dependent diabetes  DISPOSITION AND DISCUSSIONS  I have discussed management of the patient with the following physicians and JOSE's: I spoke with Dr. Rob DEE  On-call for the intensive care unit.  He will assess the patient for hospitalization.    I spoke with Dr. Moeller [facility] who  will assess the patient for hospitalization.     Discussion of management with other [facility] or appropriate source(s): None         Barriers to care at this time, including but not limited to:  None .     Decision tools and prescription drugs considered including, but not limited to:  IV fluids for dehydration and acidosis .      CRITICAL CARE  The very real possibilty of a deterioration of this patient's condition required the highest level of my preparedness for sudden, emergent intervention.  I provided critical care services, which included medication orders, frequent reevaluations of the patient's condition and response to treatment, ordering and reviewing test results, and discussing the case with various consultants.  The critical care time associated with the care of the patient was 40 minutes. Review chart for interventions. This time is exclusive of any other billable procedures.       FINAL DIAGNOSIS  1. Diabetic ketoacidosis without coma associated with type 2 diabetes mellitus (HCC)    2. Hematemesis with nausea    3. Abdominal pain, unspecified abdominal location    4. Abnormal EKG    5. Dehydration      Total critical care time 40 minutes as outlined above    Electronically signed by: Mian Morton M.D., 2/24/2024 9:26 AM

## 2024-02-24 NOTE — ASSESSMENT & PLAN NOTE
With hyperglycemia and ketosis without acidosis  Last glycated hemoglobin was 7%  Cont on SSI and lantus   Adjust according to blood sugars trend

## 2024-02-24 NOTE — ASSESSMENT & PLAN NOTE
No known history of primary hypertension  Still not well controlled so adding amlodipine to his regimen

## 2024-02-24 NOTE — ASSESSMENT & PLAN NOTE
Has hyperglycemia and ketosis without acidosis  Blood pH is within normal limits despite the low bicarb level.  This is likely due to mixed metabolic alkalosis due to excessive vomiting.  Last glycated hemoglobin was 7%  Cont on ssi and long actin insulin  DKA improved

## 2024-02-24 NOTE — ASSESSMENT & PLAN NOTE
Likely secondary to esophagitis  CT imaging show evidence for edema in the lower esophagus  Has resolved    hemoglobin stable

## 2024-02-24 NOTE — CONSULTS
Brief intensivist note.     43male with hx of DM II, non insulin dependent who presented with N/V in the past 24 hours, too numerous too count. No diarrhea  BG in 240s, BOH 5.34  CT A/P with edema in lower esophagus.   Pt is receiving fluids  VB.42/22  Creaitnine 1.21, HCO3 15  On exam, alert, oriented   Mildly cool extremities in upper and lower ext    Recommend to give more fluids resuscitation  Can give regular insulin 10 units  Repeat BMP  Pt does not need ICU /IMCU   Can go to telemetry   D/w ERP and RTOC  Will sign off, please call with deejay Beth D.O.

## 2024-02-24 NOTE — PROGRESS NOTES
Report received by ED RN and patient care assumed. Tele Box in place, patient is A&O4, and on 2LNC. Patient states 0/10 pain. Fall precautions in place and patient educated on use of call light for assistance. Pt updated on POC with no questions or concerns. Hourly monitoring initiated.

## 2024-02-24 NOTE — ASSESSMENT & PLAN NOTE
CT imaging show evidence for edema in the lower esophagus  I will start intravenous pantoprazole.  I will start sucralfate   Attending Attestation (For Attendings USE Only)...

## 2024-02-25 LAB
ALBUMIN SERPL BCP-MCNC: 4.1 G/DL (ref 3.2–4.9)
ALBUMIN/GLOB SERPL: 1.5 G/DL
ALP SERPL-CCNC: 43 U/L (ref 30–99)
ALT SERPL-CCNC: 15 U/L (ref 2–50)
ANION GAP SERPL CALC-SCNC: 15 MMOL/L (ref 7–16)
AST SERPL-CCNC: 17 U/L (ref 12–45)
BILIRUB SERPL-MCNC: 0.5 MG/DL (ref 0.1–1.5)
BUN SERPL-MCNC: 21 MG/DL (ref 8–22)
CALCIUM ALBUM COR SERPL-MCNC: 8.9 MG/DL (ref 8.5–10.5)
CALCIUM SERPL-MCNC: 9 MG/DL (ref 8.5–10.5)
CHLORIDE SERPL-SCNC: 106 MMOL/L (ref 96–112)
CHOLEST SERPL-MCNC: 161 MG/DL (ref 100–199)
CO2 SERPL-SCNC: 20 MMOL/L (ref 20–33)
CREAT SERPL-MCNC: 1 MG/DL (ref 0.5–1.4)
ERYTHROCYTE [DISTWIDTH] IN BLOOD BY AUTOMATED COUNT: 42.7 FL (ref 35.9–50)
ERYTHROCYTE [DISTWIDTH] IN BLOOD BY AUTOMATED COUNT: 43.7 FL (ref 35.9–50)
GFR SERPLBLD CREATININE-BSD FMLA CKD-EPI: 97 ML/MIN/1.73 M 2
GLOBULIN SER CALC-MCNC: 2.7 G/DL (ref 1.9–3.5)
GLUCOSE BLD STRIP.AUTO-MCNC: 102 MG/DL (ref 65–99)
GLUCOSE BLD STRIP.AUTO-MCNC: 136 MG/DL (ref 65–99)
GLUCOSE BLD STRIP.AUTO-MCNC: 144 MG/DL (ref 65–99)
GLUCOSE BLD STRIP.AUTO-MCNC: 86 MG/DL (ref 65–99)
GLUCOSE BLD STRIP.AUTO-MCNC: 91 MG/DL (ref 65–99)
GLUCOSE SERPL-MCNC: 145 MG/DL (ref 65–99)
HCT VFR BLD AUTO: 42 % (ref 42–52)
HCT VFR BLD AUTO: 43.6 % (ref 42–52)
HDLC SERPL-MCNC: 70 MG/DL
HGB BLD-MCNC: 14.9 G/DL (ref 14–18)
HGB BLD-MCNC: 15.2 G/DL (ref 14–18)
LACTATE SERPL-SCNC: 0.9 MMOL/L (ref 0.5–2)
LACTATE SERPL-SCNC: 0.9 MMOL/L (ref 0.5–2)
LDLC SERPL CALC-MCNC: 77 MG/DL
MAGNESIUM SERPL-MCNC: 1.8 MG/DL (ref 1.5–2.5)
MCH RBC QN AUTO: 31 PG (ref 27–33)
MCH RBC QN AUTO: 31 PG (ref 27–33)
MCHC RBC AUTO-ENTMCNC: 34.9 G/DL (ref 32.3–36.5)
MCHC RBC AUTO-ENTMCNC: 35.5 G/DL (ref 32.3–36.5)
MCV RBC AUTO: 87.5 FL (ref 81.4–97.8)
MCV RBC AUTO: 89 FL (ref 81.4–97.8)
PLATELET # BLD AUTO: 184 K/UL (ref 164–446)
PLATELET # BLD AUTO: 197 K/UL (ref 164–446)
PMV BLD AUTO: 9.7 FL (ref 9–12.9)
PMV BLD AUTO: 9.8 FL (ref 9–12.9)
POTASSIUM SERPL-SCNC: 4.3 MMOL/L (ref 3.6–5.5)
PROT SERPL-MCNC: 6.8 G/DL (ref 6–8.2)
RBC # BLD AUTO: 4.8 M/UL (ref 4.7–6.1)
RBC # BLD AUTO: 4.9 M/UL (ref 4.7–6.1)
SODIUM SERPL-SCNC: 141 MMOL/L (ref 135–145)
TRIGL SERPL-MCNC: 71 MG/DL (ref 0–149)
WBC # BLD AUTO: 11.7 K/UL (ref 4.8–10.8)
WBC # BLD AUTO: 11.9 K/UL (ref 4.8–10.8)

## 2024-02-25 PROCEDURE — 82962 GLUCOSE BLOOD TEST: CPT | Mod: 91

## 2024-02-25 PROCEDURE — 99233 SBSQ HOSP IP/OBS HIGH 50: CPT | Performed by: INTERNAL MEDICINE

## 2024-02-25 PROCEDURE — 700111 HCHG RX REV CODE 636 W/ 250 OVERRIDE (IP): Performed by: INTERNAL MEDICINE

## 2024-02-25 PROCEDURE — A9270 NON-COVERED ITEM OR SERVICE: HCPCS | Performed by: HOSPITALIST

## 2024-02-25 PROCEDURE — 85027 COMPLETE CBC AUTOMATED: CPT

## 2024-02-25 PROCEDURE — 700105 HCHG RX REV CODE 258: Performed by: INTERNAL MEDICINE

## 2024-02-25 PROCEDURE — 83605 ASSAY OF LACTIC ACID: CPT | Mod: 91

## 2024-02-25 PROCEDURE — 700105 HCHG RX REV CODE 258: Performed by: HOSPITALIST

## 2024-02-25 PROCEDURE — 700111 HCHG RX REV CODE 636 W/ 250 OVERRIDE (IP): Performed by: HOSPITALIST

## 2024-02-25 PROCEDURE — 80053 COMPREHEN METABOLIC PANEL: CPT

## 2024-02-25 PROCEDURE — 83735 ASSAY OF MAGNESIUM: CPT

## 2024-02-25 PROCEDURE — 770020 HCHG ROOM/CARE - TELE (206)

## 2024-02-25 PROCEDURE — 36415 COLL VENOUS BLD VENIPUNCTURE: CPT

## 2024-02-25 PROCEDURE — 80061 LIPID PANEL: CPT

## 2024-02-25 PROCEDURE — 700102 HCHG RX REV CODE 250 W/ 637 OVERRIDE(OP): Performed by: HOSPITALIST

## 2024-02-25 PROCEDURE — C9113 INJ PANTOPRAZOLE SODIUM, VIA: HCPCS | Performed by: HOSPITALIST

## 2024-02-25 RX ORDER — DEXTROSE AND SODIUM CHLORIDE 5; .9 G/100ML; G/100ML
INJECTION, SOLUTION INTRAVENOUS CONTINUOUS
Status: DISCONTINUED | OUTPATIENT
Start: 2024-02-25 | End: 2024-02-27

## 2024-02-25 RX ORDER — HYDROMORPHONE HYDROCHLORIDE 1 MG/ML
0.5 INJECTION, SOLUTION INTRAMUSCULAR; INTRAVENOUS; SUBCUTANEOUS EVERY 4 HOURS PRN
Status: DISCONTINUED | OUTPATIENT
Start: 2024-02-25 | End: 2024-02-27

## 2024-02-25 RX ORDER — OXYCODONE HYDROCHLORIDE 5 MG/1
5-10 TABLET ORAL EVERY 4 HOURS PRN
Status: DISCONTINUED | OUTPATIENT
Start: 2024-02-25 | End: 2024-02-27

## 2024-02-25 RX ADMIN — PROCHLORPERAZINE EDISYLATE 10 MG: 5 INJECTION INTRAMUSCULAR; INTRAVENOUS at 16:20

## 2024-02-25 RX ADMIN — PANTOPRAZOLE SODIUM 40 MG: 40 INJECTION, POWDER, FOR SOLUTION INTRAVENOUS at 16:12

## 2024-02-25 RX ADMIN — HYDROMORPHONE HYDROCHLORIDE 0.5 MG: 1 INJECTION, SOLUTION INTRAMUSCULAR; INTRAVENOUS; SUBCUTANEOUS at 21:10

## 2024-02-25 RX ADMIN — SUCRALFATE 1 G: 1 SUSPENSION ORAL at 05:02

## 2024-02-25 RX ADMIN — PROCHLORPERAZINE EDISYLATE 10 MG: 5 INJECTION INTRAMUSCULAR; INTRAVENOUS at 08:05

## 2024-02-25 RX ADMIN — SUCRALFATE 1 G: 1 SUSPENSION ORAL at 16:14

## 2024-02-25 RX ADMIN — HYDROMORPHONE HYDROCHLORIDE 0.5 MG: 1 INJECTION, SOLUTION INTRAMUSCULAR; INTRAVENOUS; SUBCUTANEOUS at 16:29

## 2024-02-25 RX ADMIN — SUCRALFATE 1 G: 1 SUSPENSION ORAL at 12:35

## 2024-02-25 RX ADMIN — INSULIN GLARGINE-YFGN 6 UNITS: 100 INJECTION, SOLUTION SUBCUTANEOUS at 04:45

## 2024-02-25 RX ADMIN — DEXTROSE AND SODIUM CHLORIDE: 5; 900 INJECTION, SOLUTION INTRAVENOUS at 16:31

## 2024-02-25 RX ADMIN — ONDANSETRON 4 MG: 2 INJECTION INTRAMUSCULAR; INTRAVENOUS at 04:50

## 2024-02-25 RX ADMIN — SODIUM CHLORIDE: 9 INJECTION, SOLUTION INTRAVENOUS at 16:11

## 2024-02-25 RX ADMIN — PANTOPRAZOLE SODIUM 40 MG: 40 INJECTION, POWDER, FOR SOLUTION INTRAVENOUS at 04:44

## 2024-02-25 ASSESSMENT — ENCOUNTER SYMPTOMS
VOMITING: 1
NAUSEA: 1
EYE DISCHARGE: 0
EYE REDNESS: 0
MYALGIAS: 0
CHILLS: 0
COUGH: 0
STRIDOR: 0
NERVOUS/ANXIOUS: 0
SHORTNESS OF BREATH: 0
ABDOMINAL PAIN: 1
ROS GI COMMENTS: HEMATEMESIS
FEVER: 0
FOCAL WEAKNESS: 0
BRUISES/BLEEDS EASILY: 0
FLANK PAIN: 0

## 2024-02-25 ASSESSMENT — FIBROSIS 4 INDEX: FIB4 SCORE: 0.98

## 2024-02-25 ASSESSMENT — PAIN DESCRIPTION - PAIN TYPE: TYPE: ACUTE PAIN

## 2024-02-25 NOTE — PROGRESS NOTES
Hospital Medicine Daily Progress Note    Date of Service  2/25/2024    Chief Complaint  Jose De Jesus Banda is a 43 y.o. male admitted 2/24/2024 with N/V    Hospital Course  This is  a 43 y.o. male with a past medical history of type 2 diabetes who presented 2/24/2024 with nausea, vomiting and abdominal pain.  The patient reports that has not been feeling well since yesterday.  Has been having progressively worsening generalized weakness, abdominal pain nausea and vomiting   Patient found to have elevated blood sugars initially concern for DKA   Patient admitted for further treatment     Interval Problem Update  Patient seen and examined, still having nausea, and some vomiting, afebrile, no chest pain or SOB  Cont on antiemetics and IV fluid.  Monitor electrolytes     I have discussed this patient's plan of care and discharge plan at IDT rounds today with Case Management, Nursing, Nursing leadership, and other members of the IDT team.    Consultants/Specialty  None     Code Status  Full Code    Disposition  The patient is not medically cleared for discharge to home or a post-acute facility.      I have placed the appropriate orders for post-discharge needs.    Review of Systems  Review of Systems   Constitutional:  Positive for malaise/fatigue. Negative for chills and fever.   Eyes:  Negative for discharge and redness.   Respiratory:  Negative for cough, shortness of breath and stridor.    Cardiovascular:  Negative for chest pain and leg swelling.   Gastrointestinal:  Positive for abdominal pain, nausea and vomiting.        Hematemesis   Genitourinary:  Negative for flank pain.   Musculoskeletal:  Negative for myalgias.   Skin: Negative.    Neurological:  Negative for focal weakness.   Endo/Heme/Allergies:  Does not bruise/bleed easily.   Psychiatric/Behavioral:  The patient is not nervous/anxious.         Physical Exam  Temp:  [36.3 °C (97.3 °F)-37 °C (98.6 °F)] 36.8 °C (98.2 °F)  Pulse:  [] 64  Resp:   [12-22] 12  BP: (139-164)/() 139/92  SpO2:  [98 %-100 %] 98 %    Physical Exam  Constitutional:       General: He is not in acute distress.     Appearance: He is ill-appearing and diaphoretic.   HENT:      Head: Atraumatic.      Right Ear: External ear normal.      Left Ear: External ear normal.      Nose: No congestion or rhinorrhea.      Mouth/Throat:      Mouth: Mucous membranes are dry.   Eyes:      General: No scleral icterus.        Right eye: No discharge.         Left eye: No discharge.      Pupils: Pupils are equal, round, and reactive to light.   Cardiovascular:      Rate and Rhythm: Regular rhythm. Tachycardia present.   Pulmonary:      Effort: Pulmonary effort is normal.   Abdominal:      General: There is no distension.   Musculoskeletal:      Cervical back: Neck supple. No rigidity. No muscular tenderness.      Right lower leg: No edema.      Left lower leg: No edema.   Skin:     Capillary Refill: Capillary refill takes 2 to 3 seconds.      Coloration: Skin is pale. Skin is not jaundiced.   Neurological:      Mental Status: He is alert and oriented to person, place, and time.      Coordination: Coordination normal.   Psychiatric:         Mood and Affect: Mood normal.         Behavior: Behavior normal.         Fluids    Intake/Output Summary (Last 24 hours) at 2/25/2024 1507  Last data filed at 2/25/2024 0900  Gross per 24 hour   Intake 100 ml   Output 0 ml   Net 100 ml       Laboratory  Recent Labs     02/24/24  1507 02/25/24  0205 02/25/24  0924   WBC 15.3* 11.7* 11.9*   RBC 4.97 4.80 4.90   HEMOGLOBIN 15.3 14.9 15.2   HEMATOCRIT 44.7 42.0 43.6   MCV 89.9 87.5 89.0   MCH 30.8 31.0 31.0   MCHC 34.2 35.5 34.9   RDW 43.0 42.7 43.7   PLATELETCT 198 197 184   MPV 9.8 9.8 9.7     Recent Labs     02/24/24  0847 02/24/24  2053 02/25/24  0205   SODIUM 141 139 141   POTASSIUM 4.1 4.6 4.3   CHLORIDE 96 104 106   CO2 15* 16* 20   GLUCOSE 241* 161* 145*   BUN 19 21 21   CREATININE 1.21 1.14 1.00   CALCIUM  10.3 9.4 9.0             Recent Labs     02/25/24  0205   TRIGLYCERIDE 71   HDL 70   LDL 77       Imaging  CT-ABDOMEN-PELVIS WITH   Final Result      1.  Mild wall thickening and edema in the distal esophagus.      2.  Otherwise no acute abnormalities are identified in the abdomen or pelvis.      DX-CHEST-PORTABLE (1 VIEW)   Final Result         No acute cardiac or pulmonary abnormality is identified.      EC-ECHOCARDIOGRAM COMPLETE W/O CONT    (Results Pending)        Assessment/Plan  * Diabetic ketosis (HCC)- (present on admission)  Assessment & Plan  Has hyperglycemia and ketosis without acidosis  Blood pH is within normal limits despite the low bicarb level.  This is likely due to mixed metabolic alkalosis due to excessive vomiting.  Last glycated hemoglobin was 7%  Cont on ssi and long actin insulin  DKA improved     Abnormal EKG- (present on admission)  Assessment & Plan  EKG shows sinus tachycardia with a rate of 99, there is no ST elevation, there is ST depression in leads II, 3, aVF, leads V3-V6.    I will place on continuous cardiac monitoring  I will check and trend troponins  I will check echocardiography       Elevated blood pressure reading- (present on admission)  Assessment & Plan  No known history of primary hypertension  Likely secondary to severe pain  I will place on multimodal pain medications   I will start as needed labetalol for extreme hypertension  Consider starting scheduled antihypertensive medications according to blood pressure trend     High anion gap metabolic disturbance- (present on admission)  Assessment & Plan  Blood pH is within normal limits despite the low bicarb level.  This is likely due to mixed metabolic alkalosis due to excessive vomiting.  Anticipate improvement with intravenous fluids, insulin and antiemetics.  I will order follow-up BMP.    Esophagitis- (present on admission)  Assessment & Plan  CT imaging show evidence for edema in the lower esophagus  I will start  intravenous pantoprazole.  I will start sucralfate    Hematemesis- (present on admission)  Assessment & Plan  Likely secondary to esophagitis  CT imaging show evidence for edema in the lower esophagus  I will start intravenous pantoprazole.  I will start sucralfate  Insert to wide pore peripheral IV accesses    I will order intravenous fluids   I will order H&H to be checked every 8 hours  Transfuse for Hb <7     Type 2 diabetes mellitus with hyperglycemia, without long-term current use of insulin (HCC)- (present on admission)  Assessment & Plan  With hyperglycemia and ketosis without acidosis  Last glycated hemoglobin was 7%  Cont on SSI and lantus   Adjust according to blood sugars trend     Dehydration- (present on admission)  Assessment & Plan  Likely secondary to reduced oral intake, increase in losses secondary to vomiting, and increase in insensible loss due to osmotic diuresis  Encourage oral intake as tolerated, antiemetics as needed.  Intravenous hydration until adequate oral intake is achieved.     Dyslipidemia- (present on admission)  Assessment & Plan  Cardiac diet when able to take orally         VTE prophylaxis:scd     Greater than 51 minutes spent prepping to see patient (e.g. review of tests) obtaining and/or reviewing separately obtained history. Performing a medically appropriate examination and/ evaluation.  Counseling and educating the patient/family/caregiver.  Ordering medications, tests, or procedures.  Referring and communicating with other health care professionals.  Documenting clinical information in EPIC.  Independently interpreting results and communicating results to patient/family/caregiver.  Care coordination.      I have performed a physical exam and reviewed and updated ROS and Plan today (2/25/2024). In review of yesterday's note (2/24/2024), there are no changes except as documented above.

## 2024-02-25 NOTE — PROGRESS NOTES
Assumed care on patient post bedside report. Easily wakes up to voice. In no apparent distress. On 1LNC. Denies pain. Vss.    Tele on and heart rhythm and rate checked on monitor.    Safety precautions in place are side rails up x 2, bed to lowest level, alarms on. Appropriate to use call light for needs and assistance. Will round hourly and as needed.

## 2024-02-25 NOTE — CARE PLAN
The patient is Stable - Low risk of patient condition declining or worsening    Shift Goals  Clinical Goals: VSS, IVF, WNL LAB VALUES, MEDICATE FOR N/V  Patient Goals: REST    Progress made toward(s) clinical / shift goals:        Patient is not progressing towards the following goals:      Problem: Knowledge Deficit - Standard  Goal: Patient and family/care givers will demonstrate understanding of plan of care, disease process/condition, diagnostic tests and medications  Description: Target End Date:  1-3 days or as soon as patient condition allows    Document in Patient Education    1.  Patient and family/caregiver oriented to unit, equipment, visitation policy and means for communicating concern  2.  Complete/review Learning Assessment  3.  Assess knowledge level of disease process/condition, treatment plan, diagnostic tests and medications  4.  Explain disease process/condition, treatment plan, diagnostic tests and medications  Outcome: Progressing     Problem: Fall Risk  Goal: Patient will remain free from falls  Description: Target End Date:  Prior to discharge or change in level of care    Document interventions on the [facility] Fall Risk Assessment    1.  Assess for fall risk factors  2.  Implement fall precautions  Outcome: Progressing     Problem: Pain - Standard  Goal: Alleviation of pain or a reduction in pain to the patient’s comfort goal  Description: Target End Date:  Prior to discharge or change in level of care    Document on Vitals flowsheet    1.  Document pain using the appropriate pain scale per order or unit policy  2.  Educate and implement non-pharmacologic comfort measures (i.e. relaxation, distraction, massage, cold/heat therapy, etc.)  3.  Pain management medications as ordered  4.  Reassess pain after pain med administration per policy  5.  If opiods administered assess patient's response to pain medication is appropriate per POSS sedation scale  6.  Follow pain management plan  developed in collaboration with patient and interdisciplinary team (including palliative care or pain specialists if applicable)  Outcome: Progressing     Problem: Physical Regulation  Goal: Diagnostic test results will improve  Description: Target End Date:  Prior to discharge or change in level of care    1.  Monitor lactic acid levels  2.  Monitor ABG's  3.  Monitor diagnostic test results  Outcome: Not Progressing  Goal: Signs and symptoms of infection will decrease  Description: Target End Date:  Prior to discharge or change in level of care    1.  Remove potential routes of infection, such as central lines and urinary catheter  2.  Follow facility protocol for changing IV tubing and sites  3.  Collaborate with Infectious Disease  4.  Antibiotic therapy per provider order  5.  Note drug effects and monitor for antibiotic toxicity  Outcome: Not Progressing

## 2024-02-25 NOTE — CARE PLAN
The patient is Stable - Low risk of patient condition declining or worsening    Shift Goals  Clinical Goals: Manage NV, Monitor Labs  Patient Goals: Comfort, Rest    Progress made toward(s) clinical / shift goals:  Patient educated on POC and verbalized an understanding. Patient's vitals stable, educated about blood pressure and blood sugar. Verbalized an understanding.       Problem: Knowledge Deficit - Standard  Goal: Patient and family/care givers will demonstrate understanding of plan of care, disease process/condition, diagnostic tests and medications  Outcome: Progressing     Problem: Hemodynamics  Goal: Patient's hemodynamics, fluid balance and neurologic status will be stable or improve  Outcome: Progressing       Patient is not progressing towards the following goals: NA

## 2024-02-25 NOTE — CARE PLAN
The patient is Stable - Low risk of patient condition declining or worsening    Shift Goals  Clinical Goals: vomit free  Patient Goals: feel better    Progress made toward(s) clinical / shift goals:  goals met    Patient is not progressing towards the following goals:  Problem: Pain - Standard  Goal: Alleviation of pain or a reduction in pain to the patient’s comfort goal  Outcome: Progressing     Problem: Knowledge Deficit - Standard  Goal: Patient and family/care givers will demonstrate understanding of plan of care, disease process/condition, diagnostic tests and medications  Outcome: Progressing     Problem: Fall Risk  Goal: Patient will remain free from falls  Outcome: Progressing     Problem: Hemodynamics  Goal: Patient's hemodynamics, fluid balance and neurologic status will be stable or improve  Outcome: Progressing     Problem: Fluid Volume  Goal: Fluid volume balance will be maintained  Outcome: Progressing     Problem: Urinary - Renal Perfusion  Goal: Ability to achieve and maintain adequate renal perfusion and functioning will improve  Outcome: Progressing     Problem: Respiratory  Goal: Patient will achieve/maintain optimum respiratory ventilation and gas exchange  Outcome: Progressing     Problem: Physical Regulation  Goal: Diagnostic test results will improve  Outcome: Progressing  Goal: Signs and symptoms of infection will decrease  Outcome: Progressing

## 2024-02-26 ENCOUNTER — APPOINTMENT (OUTPATIENT)
Dept: CARDIOLOGY | Facility: MEDICAL CENTER | Age: 44
DRG: 380 | End: 2024-02-26
Attending: HOSPITALIST
Payer: COMMERCIAL

## 2024-02-26 ENCOUNTER — APPOINTMENT (OUTPATIENT)
Dept: RADIOLOGY | Facility: MEDICAL CENTER | Age: 44
DRG: 380 | End: 2024-02-26
Attending: INTERNAL MEDICINE
Payer: COMMERCIAL

## 2024-02-26 LAB
ANION GAP SERPL CALC-SCNC: 13 MMOL/L (ref 7–16)
BUN SERPL-MCNC: 17 MG/DL (ref 8–22)
CALCIUM SERPL-MCNC: 8.3 MG/DL (ref 8.5–10.5)
CHLORIDE SERPL-SCNC: 105 MMOL/L (ref 96–112)
CO2 SERPL-SCNC: 20 MMOL/L (ref 20–33)
CREAT SERPL-MCNC: 0.86 MG/DL (ref 0.5–1.4)
ERYTHROCYTE [DISTWIDTH] IN BLOOD BY AUTOMATED COUNT: 44.1 FL (ref 35.9–50)
GFR SERPLBLD CREATININE-BSD FMLA CKD-EPI: 111 ML/MIN/1.73 M 2
GLUCOSE BLD STRIP.AUTO-MCNC: 149 MG/DL (ref 65–99)
GLUCOSE BLD STRIP.AUTO-MCNC: 151 MG/DL (ref 65–99)
GLUCOSE BLD STRIP.AUTO-MCNC: 151 MG/DL (ref 65–99)
GLUCOSE BLD STRIP.AUTO-MCNC: 161 MG/DL (ref 65–99)
GLUCOSE BLD STRIP.AUTO-MCNC: 173 MG/DL (ref 65–99)
GLUCOSE SERPL-MCNC: 168 MG/DL (ref 65–99)
HCT VFR BLD AUTO: 41 % (ref 42–52)
HGB BLD-MCNC: 14.3 G/DL (ref 14–18)
LV EJECT FRACT MOD 2C 99903: 70.47
LV EJECT FRACT MOD 4C 99902: 72.45
LV EJECT FRACT MOD BP 99901: 70.89
MCH RBC QN AUTO: 31.4 PG (ref 27–33)
MCHC RBC AUTO-ENTMCNC: 34.9 G/DL (ref 32.3–36.5)
MCV RBC AUTO: 89.9 FL (ref 81.4–97.8)
PLATELET # BLD AUTO: 167 K/UL (ref 164–446)
PMV BLD AUTO: 9.8 FL (ref 9–12.9)
POTASSIUM SERPL-SCNC: 3.9 MMOL/L (ref 3.6–5.5)
RBC # BLD AUTO: 4.56 M/UL (ref 4.7–6.1)
SODIUM SERPL-SCNC: 138 MMOL/L (ref 135–145)
WBC # BLD AUTO: 7 K/UL (ref 4.8–10.8)

## 2024-02-26 PROCEDURE — 700102 HCHG RX REV CODE 250 W/ 637 OVERRIDE(OP): Performed by: INTERNAL MEDICINE

## 2024-02-26 PROCEDURE — 700111 HCHG RX REV CODE 636 W/ 250 OVERRIDE (IP): Performed by: INTERNAL MEDICINE

## 2024-02-26 PROCEDURE — 770020 HCHG ROOM/CARE - TELE (206)

## 2024-02-26 PROCEDURE — A9270 NON-COVERED ITEM OR SERVICE: HCPCS | Performed by: INTERNAL MEDICINE

## 2024-02-26 PROCEDURE — 700111 HCHG RX REV CODE 636 W/ 250 OVERRIDE (IP): Performed by: HOSPITALIST

## 2024-02-26 PROCEDURE — 700117 HCHG RX CONTRAST REV CODE 255: Performed by: INTERNAL MEDICINE

## 2024-02-26 PROCEDURE — 74210 X-RAY XM PHRNX&/CRV ESOPH C+: CPT

## 2024-02-26 PROCEDURE — 99233 SBSQ HOSP IP/OBS HIGH 50: CPT | Performed by: INTERNAL MEDICINE

## 2024-02-26 PROCEDURE — 93306 TTE W/DOPPLER COMPLETE: CPT

## 2024-02-26 PROCEDURE — 82962 GLUCOSE BLOOD TEST: CPT | Mod: 91

## 2024-02-26 PROCEDURE — 700102 HCHG RX REV CODE 250 W/ 637 OVERRIDE(OP): Performed by: HOSPITALIST

## 2024-02-26 PROCEDURE — 93306 TTE W/DOPPLER COMPLETE: CPT | Mod: 26 | Performed by: INTERNAL MEDICINE

## 2024-02-26 PROCEDURE — 700105 HCHG RX REV CODE 258: Performed by: INTERNAL MEDICINE

## 2024-02-26 PROCEDURE — A9270 NON-COVERED ITEM OR SERVICE: HCPCS | Performed by: HOSPITALIST

## 2024-02-26 PROCEDURE — C9113 INJ PANTOPRAZOLE SODIUM, VIA: HCPCS | Performed by: HOSPITALIST

## 2024-02-26 PROCEDURE — 99222 1ST HOSP IP/OBS MODERATE 55: CPT | Performed by: INTERNAL MEDICINE

## 2024-02-26 PROCEDURE — 85027 COMPLETE CBC AUTOMATED: CPT

## 2024-02-26 PROCEDURE — 80048 BASIC METABOLIC PNL TOTAL CA: CPT

## 2024-02-26 PROCEDURE — 36415 COLL VENOUS BLD VENIPUNCTURE: CPT

## 2024-02-26 RX ORDER — AMLODIPINE BESYLATE 10 MG/1
10 TABLET ORAL
Status: DISCONTINUED | OUTPATIENT
Start: 2024-02-26 | End: 2024-02-28 | Stop reason: HOSPADM

## 2024-02-26 RX ADMIN — HYDROMORPHONE HYDROCHLORIDE 0.5 MG: 1 INJECTION, SOLUTION INTRAMUSCULAR; INTRAVENOUS; SUBCUTANEOUS at 06:55

## 2024-02-26 RX ADMIN — DEXTROSE AND SODIUM CHLORIDE: 5; 900 INJECTION, SOLUTION INTRAVENOUS at 00:01

## 2024-02-26 RX ADMIN — PROCHLORPERAZINE EDISYLATE 10 MG: 5 INJECTION INTRAMUSCULAR; INTRAVENOUS at 17:06

## 2024-02-26 RX ADMIN — DEXTROSE AND SODIUM CHLORIDE: 5; 900 INJECTION, SOLUTION INTRAVENOUS at 06:56

## 2024-02-26 RX ADMIN — SUCRALFATE 1 G: 1 SUSPENSION ORAL at 17:00

## 2024-02-26 RX ADMIN — HYDROMORPHONE HYDROCHLORIDE 0.5 MG: 1 INJECTION, SOLUTION INTRAMUSCULAR; INTRAVENOUS; SUBCUTANEOUS at 02:13

## 2024-02-26 RX ADMIN — PROCHLORPERAZINE EDISYLATE 10 MG: 5 INJECTION INTRAMUSCULAR; INTRAVENOUS at 08:00

## 2024-02-26 RX ADMIN — PANTOPRAZOLE SODIUM 40 MG: 40 INJECTION, POWDER, FOR SOLUTION INTRAVENOUS at 04:34

## 2024-02-26 RX ADMIN — HYDROMORPHONE HYDROCHLORIDE 0.5 MG: 1 INJECTION, SOLUTION INTRAMUSCULAR; INTRAVENOUS; SUBCUTANEOUS at 23:42

## 2024-02-26 RX ADMIN — INSULIN LISPRO 1 UNITS: 100 INJECTION, SOLUTION INTRAVENOUS; SUBCUTANEOUS at 17:10

## 2024-02-26 RX ADMIN — LIDOCAINE HYDROCHLORIDE 30 ML: 20 SOLUTION OROPHARYNGEAL at 12:16

## 2024-02-26 RX ADMIN — HYDROMORPHONE HYDROCHLORIDE 0.5 MG: 1 INJECTION, SOLUTION INTRAMUSCULAR; INTRAVENOUS; SUBCUTANEOUS at 15:20

## 2024-02-26 RX ADMIN — AMLODIPINE BESYLATE 10 MG: 10 TABLET ORAL at 08:01

## 2024-02-26 RX ADMIN — INSULIN LISPRO 1 UNITS: 100 INJECTION, SOLUTION INTRAVENOUS; SUBCUTANEOUS at 07:53

## 2024-02-26 RX ADMIN — INSULIN GLARGINE-YFGN 6 UNITS: 100 INJECTION, SOLUTION SUBCUTANEOUS at 17:09

## 2024-02-26 RX ADMIN — INSULIN LISPRO 1 UNITS: 100 INJECTION, SOLUTION INTRAVENOUS; SUBCUTANEOUS at 11:19

## 2024-02-26 RX ADMIN — BARIUM SULFATE 700 MG: 700 TABLET ORAL at 12:45

## 2024-02-26 RX ADMIN — INSULIN GLARGINE-YFGN 6 UNITS: 100 INJECTION, SOLUTION SUBCUTANEOUS at 04:39

## 2024-02-26 RX ADMIN — PANTOPRAZOLE SODIUM 40 MG: 40 INJECTION, POWDER, FOR SOLUTION INTRAVENOUS at 17:00

## 2024-02-26 ASSESSMENT — PAIN DESCRIPTION - PAIN TYPE
TYPE: ACUTE PAIN
TYPE: ACUTE PAIN

## 2024-02-26 ASSESSMENT — ENCOUNTER SYMPTOMS
COUGH: 0
FOCAL WEAKNESS: 0
MUSCULOSKELETAL NEGATIVE: 1
EYES NEGATIVE: 1
NAUSEA: 1
FLANK PAIN: 0
NEUROLOGICAL NEGATIVE: 1
SHORTNESS OF BREATH: 0
CHILLS: 0
ABDOMINAL PAIN: 1
EYE REDNESS: 0
RESPIRATORY NEGATIVE: 1
BRUISES/BLEEDS EASILY: 0
VOMITING: 1
STRIDOR: 0
EYE DISCHARGE: 0
MYALGIAS: 0
NERVOUS/ANXIOUS: 0
ROS GI COMMENTS: HEMATEMESIS
FEVER: 0

## 2024-02-26 NOTE — CONSULTS
Gastroenterology Initial Consult Note               Author:  Cece Ding M.D. Date & Time Created: 2/26/2024 9:06 AM       Patient ID:  Name:             Jose De Jesus Banda  YOB: 1980  Age:                 43 y.o.  male  MRN:               [id number]      Referring Provider:  Karin Omalley MD        Presenting Chief Complaint:  hematemesis      History of Present Illness:    This is a 43 y.o. male with Type II DM on Ozempic, Synjardy XR, Actos and glimepiride who presented to ER on 2/24/24 with red and brown emesis.  Initially lower abdominal pain PTA and then c/o upper abdominal pain in ER  He noted blood in his vomit after he vomited several times.  Coffee grounds emesis in ER.  He was feeling fine earlier that am and his blood sugar was 120.  He thinks he may have contracted food poisoning which led him to vomit repeatedly and blood after he vomited several times.  This led to the sensation of odynophagia distal esophagus.  He does get heartburn and will use TUMS.  He has been on low dose Ozempic which does not seem to cause much gastric slowing    ER evaluation:  CT abd/pel with:  mild wall thickening and edema distal esophagus     Colonoscopy 2017 for C difficile    Review of Systems:  Review of Systems   Constitutional:         Excessive sweating and shaking like chills can be related to certain foods   HENT: Negative.     Eyes: Negative.    Respiratory: Negative.     Cardiovascular:         Non cardiac chest pain   Genitourinary: Negative.    Musculoskeletal: Negative.    Skin: Negative.    Neurological: Negative.    Endo/Heme/Allergies: Negative.              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]      Dehydration [E86.0]     Type 2 diabetes mellitus with hyperglycemia, without long-term current use of insulin (HCC) [E11.65]     Hematemesis [K92.0]     Esophagitis [K20.90]     High anion gap metabolic disturbance [E87.29]     Elevated blood pressure reading [R03.0]     Abnormal EKG [R94.31]     Dyslipidemia [E78.5]          Past Surgical History:  Past Surgical History:   Procedure Laterality Date    PB MANIPULATN SHLDR JT W ANESTHESIA Right 8/22/2019    Procedure: MANIPULATION, SHOULDER;  Surgeon: Karon Fisher M.D.;  Location: SURGERY [facility];  Service: Orthopedics    AZ SHLDR ARTHROSCOP,PART ACROMIOPLAS Right 8/22/2019    Procedure: DECOMPRESSION, SHOULDER, ARTHROSCOPIC- SUBACROMIAL;  Surgeon: Karon Fisher M.D.;  Location: SURGERY [facility];  Service: Orthopedics    SHOULDER ARTHROSCOPY Right 8/22/2019    Procedure: ARTHROSCOPY, SHOULDER- CAPSULE RELEASE;  Surgeon: Karon Fisher M.D.;  Location: SURGERY [facility];  Service: Orthopedics    COLONOSCOPY  01/2019           Hospital Medications:  Current Facility-Administered Medications   Medication Dose Frequency Provider Last Rate Last Admin    amLODIPine (Norvasc) tablet 10 mg  10 mg Q DAY Karin Omalley M.D.   10 mg at 02/26/24 0801    GI Cocktail (hyoscyamine-lidocaine-Maalox) oral susp cup 30 mL  30 mL Once Karin Omalley M.D.        oxyCODONE immediate-release (Roxicodone) tablet 5-10 mg  5-10 mg Q4HRS PRN Karin Omalley M.D.        HYDROmorphone (Dilaudid) injection 0.5 mg  0.5 mg Q4HRS PRN Karin Omalley M.D.   0.5 mg at 02/26/24 0655    D5 NS infusion   Continuous Karin Omalley M.D. 100 mL/hr at 02/26/24 0656 New Bag at 02/26/24 0656    acetaminophen (Tylenol) tablet 650 mg  650 mg Q6HRS PRN Rosalia Moeller M.D.        senna-docusate (Pericolace Or Senokot S) 8.6-50 MG per tablet 2 Tablet  2 Tablet BID Rosalia Moeller M.D.        And    polyethylene glycol/lytes (Miralax) Packet 1 Packet  1 Packet QDAY PRN Rosalia Moeller M.D.         NS infusion   Continuous Asem MARY Moeller M.D.   Stopped at 02/25/24 1631    ondansetron (Zofran) syringe/vial injection 4 mg  4 mg Q4HRS PRN Asem A JUDY Moeller   4 mg at 02/25/24 0450    ondansetron (Zofran ODT) dispertab 4 mg  4 mg Q4HRS PRN Asem A JUDY Moeller   4 mg at 02/24/24 1245    promethazine (Phenergan) tablet 12.5-25 mg  12.5-25 mg Q4HRS PRN Asem A JUDY Moeller        promethazine (Phenergan) suppository 12.5-25 mg  12.5-25 mg Q4HRS PRN Asem A JUDY Moleler        prochlorperazine (Compazine) injection 5-10 mg  5-10 mg Q4HRS PRN Asem A JUDY Moeller   10 mg at 02/26/24 0800    insulin GLARGINE (Lantus,Semglee) injection  6 Units BID Asem A JUDY Moeller   6 Units at 02/26/24 0439    And    insulin lispro (HumaLOG,AdmeLOG) injection  1-6 Units 4X/DAY ACHS Asem MARY Moeller M.D.   1 Units at 02/26/24 0753    And    dextrose 50% (D50W) injection 25 g  25 g Q15 MIN PRN Asem A JUDY Moeller        pantoprazole (Protonix) injection 40 mg  40 mg BID Asem A JUDY Moelelr   40 mg at 02/26/24 0434    sucralfate (Carafate) 1 GM/10ML suspension 1 g  1 g Q6HRS Asem A JUDY Moeller   1 g at 02/25/24 1614    labetalol (Normodyne/Trandate) injection 10 mg  10 mg Q4HRS PRN Asem A JUDY Moeller   10 mg at 02/24/24 1548    LR (Bolus) infusion 500 mL  500 mL Once PRN Asem A JUDY Moeller        NS (Bolus) 0.9 % infusion 2,223 mL  30 mL/kg Once PRN Rosalia Moeller M.D.       Last reviewed on 2/24/2024  1:47 PM by Beatriz Barnard R.N.       Current Outpatient Medications:  Medications Prior to Admission   Medication Sig Dispense Refill Last Dose    Levocetirizine Dihydrochloride (XYZAL ALLERGY 24HR) 5 MG Tab Take 5 mg by mouth every day.   2/22/2024 at PM    alpha-lipoic acid 600 MG Cap Take 600 mg by mouth every day at 6 PM.   2/22/2024 at UNK    DHEA 50 MG Cap Take 100 mg by mouth every day.   2/23/2024 at AM    NON SPECIFIED Take 1 Tablet by mouth every day. Nitric Oxide OTC Supplement   2/23/2024  at AM    SYNJARDY XR 12.5-1000 MG TABLET SR 24 HR TAKE 2 TABLETS BY MOUTH EVERY  Tablet 3 2/23/2024 at AM    glycopyrrolate (ROBINUL) 1 MG Tab TAKE 1 TABLET BY MOUTH THREE TIMES A DAY (Patient taking differently: Take 1 mg by mouth 3 times a day.) 90 Tablet 5 2/23/2024 at AFTERNOON    Vitamin D3 5000 Unit (125 mcg) Tab Take 5,000 Units by mouth every day.   2/23/2024 at AM    MILK THISTLE PLUS PO Take 1 Tablet by mouth every day.   2/23/2024 at AM    OZEMPIC, 0.25 OR 0.5 MG/DOSE, 2 MG/3ML Solution Pen-injector INJECT 0.5MG UNDER THE SKIN EVERY 7 DAYS 3 mL 11 2/19/2024 at [facility]    glimepiride (AMARYL) 4 MG Tab TAKE 1 TABLET BY MOUTH BEFORE EVENING MEAL. 90 Tablet 3 2/22/2024 at [facility]    pioglitazone (ACTOS) 30 MG Tab TAKE 1 TABLET BY MOUTH EVERY DAY 90 Tablet 3 2/22/2024 at [facility]    Multiple Vitamin (MULTIVITAMIN ADULT PO) Take 1 Tablet by mouth every day.   2/22/2024 at [facility]    S-Adenosylmethionine (THEODORA-E PO) Take 1 Tablet by mouth every day.   2/22/2024 at [facility]    fluticasone (FLONASE) 50 MCG/ACT nasal spray Spray 1 Spray in nose every day.   K at K         Medication Allergies:  No Known Allergies      Family Medical History:  Family History   Problem Relation Age of Onset    Arthritis Mother     Stroke Mother     Diabetes Mother     Hypertension Father     Diabetes Father     Lung Disease Father     Lung Cancer Maternal Grandmother     Lung Cancer Paternal Grandfather          Social History:  Social History     Socioeconomic History    Marital status:      Spouse name: Not on file    Number of children: Not on file    Years of education: Not on file    Highest education level: Not on file   Occupational History    Not on file   Tobacco Use    Smoking status: Never    Smokeless tobacco: Never   Vaping Use    Vaping Use: Never used   Substance and Sexual Activity    Alcohol use: Yes     Comment: 6 per month    Drug use: No    Sexual activity: Yes     Partners: Female   Other Topics Concern    Not on file  "  Social History Narrative    Not on file     Social Determinants of Health     Financial Resource Strain: Not on file   Food Insecurity: Not on file   Transportation Needs: Not on file   Physical Activity: Not on file   Stress: Not on file   Social Connections: Not on file   Intimate Partner Violence: Not on file   Housing Stability: Not on file         Vital signs:  Weight/BMI: Body mass index is 21.52 kg/m².  BP (!) 162/102   Pulse 84   Temp 36.8 °C (98.2 °F) (Temporal)   Resp 20   Ht 1.854 m (6' 1\")   Wt 74 kg (163 lb 2.3 oz)   SpO2 100%   Vitals:    02/26/24 0215 02/26/24 0340 02/26/24 0709 02/26/24 0740   BP: (!) 169/103 (!) 136/91 (!) 163/102 (!) 162/102   Pulse: 94 79 84    Resp: 18 18 20    Temp:  36.8 °C (98.2 °F) 36.8 °C (98.2 °F)    TempSrc:  Temporal Temporal    SpO2: 98% 98% 100%    Weight:       Height:         Oxygen Therapy:  Pulse Oximetry: 100 %, O2 (LPM): 1, O2 Delivery Device: Silicone Nasal Cannula    Intake/Output Summary (Last 24 hours) at 2/26/2024 0906  Last data filed at 2/26/2024 0646  Gross per 24 hour   Intake 2030.1 ml   Output 150 ml   Net 1880.1 ml         Physical Exam:  Physical Exam  Constitutional:       Appearance: Normal appearance.   HENT:      Head: Normocephalic and atraumatic.      Nose: Nose normal.      Mouth/Throat:      Mouth: Mucous membranes are moist.   Eyes:      Extraocular Movements: Extraocular movements intact.      Pupils: Pupils are equal, round, and reactive to light.   Cardiovascular:      Rate and Rhythm: Regular rhythm.      Heart sounds: Normal heart sounds.   Pulmonary:      Effort: Pulmonary effort is normal.      Breath sounds: Normal breath sounds.   Abdominal:      General: Bowel sounds are normal. There is no distension.      Palpations: Abdomen is soft.      Tenderness: There is no abdominal tenderness.   Musculoskeletal:         General: Normal range of motion.      Cervical back: Neck supple.   Skin:     General: Skin is warm and dry. "   Neurological:      Mental Status: He is alert and oriented to person, place, and time.                 Labs:  Recent Labs     02/24/24 0847 02/24/24 2053 02/25/24 0205 02/26/24  0240   SODIUM 141 139 141 138   POTASSIUM 4.1 4.6 4.3 3.9   CHLORIDE 96 104 106 105   CO2 15* 16* 20 20   BUN 19 21 21 17   CREATININE 1.21 1.14 1.00 0.86   MAGNESIUM 1.7  --  1.8  --    PHOSPHORUS 5.0*  --   --   --    CALCIUM 10.3 9.4 9.0 8.3*     Recent Labs     02/24/24 0847 02/24/24 2053 02/25/24 0205 02/26/24  0240   ALTSGPT 22  --  15  --    ASTSGOT 21  --  17  --    ALKPHOSPHAT 53  --  43  --    TBILIRUBIN 0.6  --  0.5  --    LIPASE 14  --   --   --    GLUCOSE 241* 161* 145* 168*     Recent Labs     02/24/24 0847 02/24/24  1507 02/25/24 0205 02/25/24 0924 02/26/24  0240   WBC 14.4*   < > 11.7* 11.9* 7.0   NEUTSPOLYS 90.10*  --   --   --   --    LYMPHOCYTES 4.40*  --   --   --   --    MONOCYTES 4.90  --   --   --   --    EOSINOPHILS 0.00  --   --   --   --    BASOPHILS 0.10  --   --   --   --    ASTSGOT 21  --  17  --   --    ALTSGPT 22  --  15  --   --    ALKPHOSPHAT 53  --  43  --   --    TBILIRUBIN 0.6  --  0.5  --   --     < > = values in this interval not displayed.     Recent Labs     02/25/24 0205 02/25/24 0924 02/26/24  0240   RBC 4.80 4.90 4.56*   HEMOGLOBIN 14.9 15.2 14.3   HEMATOCRIT 42.0 43.6 41.0*   PLATELETCT 197 184 167     Recent Results (from the past 24 hour(s))   CBC WITHOUT DIFFERENTIAL    Collection Time: 02/25/24  9:24 AM   Result Value Ref Range    WBC 11.9 (H) 4.8 - 10.8 K/uL    RBC 4.90 4.70 - 6.10 M/uL    Hemoglobin 15.2 14.0 - 18.0 g/dL    Hematocrit 43.6 42.0 - 52.0 %    MCV 89.0 81.4 - 97.8 fL    MCH 31.0 27.0 - 33.0 pg    MCHC 34.9 32.3 - 36.5 g/dL    RDW 43.7 35.9 - 50.0 fL    Platelet Count 184 164 - 446 K/uL    MPV 9.7 9.0 - 12.9 fL   POCT glucose device results    Collection Time: 02/25/24 12:35 PM   Result Value Ref Range    POC Glucose, Blood 91 65 - 99 mg/dL   POCT glucose device  results    Collection Time: 02/25/24  4:15 PM   Result Value Ref Range    POC Glucose, Blood 86 65 - 99 mg/dL   POCT glucose device results    Collection Time: 02/25/24  7:36 PM   Result Value Ref Range    POC Glucose, Blood 144 (H) 65 - 99 mg/dL   CBC WITHOUT DIFFERENTIAL    Collection Time: 02/26/24  2:40 AM   Result Value Ref Range    WBC 7.0 4.8 - 10.8 K/uL    RBC 4.56 (L) 4.70 - 6.10 M/uL    Hemoglobin 14.3 14.0 - 18.0 g/dL    Hematocrit 41.0 (L) 42.0 - 52.0 %    MCV 89.9 81.4 - 97.8 fL    MCH 31.4 27.0 - 33.0 pg    MCHC 34.9 32.3 - 36.5 g/dL    RDW 44.1 35.9 - 50.0 fL    Platelet Count 167 164 - 446 K/uL    MPV 9.8 9.0 - 12.9 fL   Basic Metabolic Panel    Collection Time: 02/26/24  2:40 AM   Result Value Ref Range    Sodium 138 135 - 145 mmol/L    Potassium 3.9 3.6 - 5.5 mmol/L    Chloride 105 96 - 112 mmol/L    Co2 20 20 - 33 mmol/L    Glucose 168 (H) 65 - 99 mg/dL    Bun 17 8 - 22 mg/dL    Creatinine 0.86 0.50 - 1.40 mg/dL    Calcium 8.3 (L) 8.5 - 10.5 mg/dL    Anion Gap 13.0 7.0 - 16.0   ESTIMATED GFR    Collection Time: 02/26/24  2:40 AM   Result Value Ref Range    GFR (CKD-EPI) 111 >60 mL/min/1.73 m 2   POCT glucose device results    Collection Time: 02/26/24  4:37 AM   Result Value Ref Range    POC Glucose, Blood 151 (H) 65 - 99 mg/dL         Radiology Review:  CT-ABDOMEN-PELVIS WITH   Final Result      1.  Mild wall thickening and edema in the distal esophagus.      2.  Otherwise no acute abnormalities are identified in the abdomen or pelvis.      DX-CHEST-PORTABLE (1 VIEW)   Final Result         No acute cardiac or pulmonary abnormality is identified.      EC-ECHOCARDIOGRAM COMPLETE W/O CONT    (Results Pending)   DX-ESOPH. FLUORO (BARIUM SWALLOW)    (Results Pending)         MDM (Data Review):   -Records reviewed and summarized in current documentation  -I personally reviewed and interpreted the laboratory results  -I personally reviewed the radiology  images    Assessment/Recommendations:    Hematemesis:  ?Selina Abraham tear, ?erosive esophagitis  Abnl imaging esophagus suggesting esophagitis  DKA  Well controlled Type II DM, follows with Endocrinology  Any syndrome with hyperhydrosis    Plan:  --Barium esophagram with tablet ordered by Dr. Omalley.  Will follow up on results    --Probable EGD tomorrow am.  Reviewed with patient.    --on pantoprazole and sucralfate but will hold am dose so as not to interfere with procedure      Cece Ding M.D.          Core Quality Measures   Reviewed items:  Labs, Medications and Radiology reports reviewed

## 2024-02-26 NOTE — PROGRESS NOTES
Assumed care on patient post bedside report. Alert and oriented x 4. In no apparent distress. Tolerating o2 at 1LNC. Denies pain and denies n/v at this time. Blood glucose at 144.     Tele on and heart rhythm and rate checked on monitor.    Plan of care - monitor v/s, lab values, medicate prn for pain and nausea, IVF and maintain safety, he verbalized understanding.    Safety precautions in place are nonskid socks on, side rails up x 2, bed to lowest level, alarms on. Appropriate to use call light for needs and assistance. Will round hourly and as needed.

## 2024-02-26 NOTE — PROGRESS NOTES
Hospital Medicine Daily Progress Note    Date of Service  2/26/2024    Chief Complaint  Jose De Jesus Banda is a 43 y.o. male admitted 2/24/2024 with N/V    Hospital Course  This is  a 43 y.o. male with a past medical history of type 2 diabetes who presented 2/24/2024 with nausea, vomiting and abdominal pain.  The patient reports that has not been feeling well since yesterday.  Has been having progressively worsening generalized weakness, abdominal pain nausea and vomiting   Patient found to have elevated blood sugars initially concern for DKA   Patient admitted for further treatment     Interval Problem Update  Patient seen and examined, still having nausea, and some vomiting, afebrile, no chest pain or SOB  Cont on antiemetics and IV fluid.  Monitor electrolytes   2/26: Patient seen and examined, afebrile still as per patient unable to keep anything down and having nausea or vomiting. Checking a barium swallow study and I have also consulted GI   Appreciate rec.   I have discussed this patient's plan of care and discharge plan at IDT rounds today with Case Management, Nursing, Nursing leadership, and other members of the IDT team.    Consultants/Specialty  None     Code Status  Full Code    Disposition  The patient is not medically cleared for discharge to home or a post-acute facility.      I have placed the appropriate orders for post-discharge needs.    Review of Systems  Review of Systems   Constitutional:  Positive for malaise/fatigue. Negative for chills and fever.   Eyes:  Negative for discharge and redness.   Respiratory:  Negative for cough, shortness of breath and stridor.    Cardiovascular:  Negative for chest pain and leg swelling.   Gastrointestinal:  Positive for abdominal pain, nausea and vomiting.        Hematemesis   Genitourinary:  Negative for flank pain.   Musculoskeletal:  Negative for myalgias.   Skin: Negative.    Neurological:  Negative for focal weakness.   Endo/Heme/Allergies:  Does not  bruise/bleed easily.   Psychiatric/Behavioral:  The patient is not nervous/anxious.         Physical Exam  Temp:  [36.8 °C (98.2 °F)] 36.8 °C (98.2 °F)  Pulse:  [65-94] 89  Resp:  [12-20] 17  BP: (136-169)/() 152/99  SpO2:  [97 %-100 %] 97 %    Physical Exam  Constitutional:       General: He is not in acute distress.     Appearance: He is ill-appearing and diaphoretic.   HENT:      Head: Atraumatic.      Right Ear: External ear normal.      Left Ear: External ear normal.      Nose: No congestion or rhinorrhea.      Mouth/Throat:      Mouth: Mucous membranes are dry.   Eyes:      General: No scleral icterus.        Right eye: No discharge.         Left eye: No discharge.      Pupils: Pupils are equal, round, and reactive to light.   Cardiovascular:      Rate and Rhythm: Regular rhythm. Tachycardia present.   Pulmonary:      Effort: Pulmonary effort is normal.   Abdominal:      General: There is no distension.   Musculoskeletal:      Cervical back: Neck supple. No rigidity. No muscular tenderness.      Right lower leg: No edema.      Left lower leg: No edema.   Skin:     Capillary Refill: Capillary refill takes 2 to 3 seconds.      Coloration: Skin is pale. Skin is not jaundiced.   Neurological:      Mental Status: He is alert and oriented to person, place, and time.      Coordination: Coordination normal.   Psychiatric:         Mood and Affect: Mood normal.         Behavior: Behavior normal.         Fluids    Intake/Output Summary (Last 24 hours) at 2/26/2024 1345  Last data filed at 2/26/2024 0900  Gross per 24 hour   Intake 2230.1 ml   Output 150 ml   Net 2080.1 ml       Laboratory  Recent Labs     02/25/24  0205 02/25/24  0924 02/26/24  0240   WBC 11.7* 11.9* 7.0   RBC 4.80 4.90 4.56*   HEMOGLOBIN 14.9 15.2 14.3   HEMATOCRIT 42.0 43.6 41.0*   MCV 87.5 89.0 89.9   MCH 31.0 31.0 31.4   MCHC 35.5 34.9 34.9   RDW 42.7 43.7 44.1   PLATELETCT 197 184 167   MPV 9.8 9.7 9.8     Recent Labs     02/24/24 2053  02/25/24  0205 02/26/24  0240   SODIUM 139 141 138   POTASSIUM 4.6 4.3 3.9   CHLORIDE 104 106 105   CO2 16* 20 20   GLUCOSE 161* 145* 168*   BUN 21 21 17   CREATININE 1.14 1.00 0.86   CALCIUM 9.4 9.0 8.3*             Recent Labs     02/25/24  0205   TRIGLYCERIDE 71   HDL 70   LDL 77       Imaging  DX-ESOPH. FLUORO (BARIUM SWALLOW)   Final Result      No abnormalities are noted on thoracic esophogram.      EC-ECHOCARDIOGRAM COMPLETE W/O CONT         CT-ABDOMEN-PELVIS WITH   Final Result      1.  Mild wall thickening and edema in the distal esophagus.      2.  Otherwise no acute abnormalities are identified in the abdomen or pelvis.      DX-CHEST-PORTABLE (1 VIEW)   Final Result         No acute cardiac or pulmonary abnormality is identified.           Assessment/Plan  * Diabetic ketosis (HCC)- (present on admission)  Assessment & Plan  Has hyperglycemia and ketosis without acidosis  Blood pH is within normal limits despite the low bicarb level.  This is likely due to mixed metabolic alkalosis due to excessive vomiting.  Last glycated hemoglobin was 7%  Cont on ssi and long actin insulin  DKA improved     Abnormal EKG- (present on admission)  Assessment & Plan  EKG shows sinus tachycardia with a rate of 99, there is no ST elevation, there is ST depression in leads II, 3, aVF, leads V3-V6.    Troponin negative  Echo pending       Elevated blood pressure reading- (present on admission)  Assessment & Plan  No known history of primary hypertension  Still not well controlled so adding amlodipine to his regimen     High anion gap metabolic disturbance- (present on admission)  Assessment & Plan  Blood pH is within normal limits despite the low bicarb level.  This is likely due to mixed metabolic alkalosis due to excessive vomiting.  Improved with IVF     Esophagitis- (present on admission)  Assessment & Plan  CT imaging show evidence for edema in the lower esophagus  I will start intravenous pantoprazole.  I will start  sucralfate    Hematemesis- (present on admission)  Assessment & Plan  Likely secondary to esophagitis  CT imaging show evidence for edema in the lower esophagus  Has resolved    hemoglobin stable     Type 2 diabetes mellitus with hyperglycemia, without long-term current use of insulin (HCC)- (present on admission)  Assessment & Plan  With hyperglycemia and ketosis without acidosis  Last glycated hemoglobin was 7%  Cont on SSI and lantus   Adjust according to blood sugars trend     Dehydration- (present on admission)  Assessment & Plan  Likely secondary to reduced oral intake, increase in losses secondary to vomiting, and increase in insensible loss due to osmotic diuresis  Encourage oral intake as tolerated, antiemetics as needed.  Intravenous hydration until adequate oral intake is achieved.     Dyslipidemia- (present on admission)  Assessment & Plan  Cardiac diet when able to take orally       Greater than 51 minutes spent prepping to see patient (e.g. review of tests) obtaining and/or reviewing separately obtained history. Performing a medically appropriate examination and/ evaluation.  Counseling and educating the patient/family/caregiver.  Ordering medications, tests, or procedures.  Referring and communicating with other health care professionals.  Documenting clinical information in EPIC.  Independently interpreting results and communicating results to patient/family/caregiver.  Care coordination.      VTE prophylaxis:scd     I have performed a physical exam and reviewed and updated ROS and Plan today (2/26/2024). In review of yesterday's note (2/25/2024), there are no changes except as documented above.

## 2024-02-26 NOTE — CARE PLAN
The patient is Stable - Low risk of patient condition declining or worsening    Shift Goals  Clinical Goals: vss, comfrt, less pain and nausea  Patient Goals: rest    Progress made toward(s) clinical / shift goals:        Patient is not progressing towards the following goals:      Problem: Pain - Standard  Goal: Alleviation of pain or a reduction in pain to the patient’s comfort goal  Description: Target End Date:  Prior to discharge or change in level of care    Document on Vitals flowsheet    1.  Document pain using the appropriate pain scale per order or unit policy  2.  Educate and implement non-pharmacologic comfort measures (i.e. relaxation, distraction, massage, cold/heat therapy, etc.)  3.  Pain management medications as ordered  4.  Reassess pain after pain med administration per policy  5.  If opiods administered assess patient's response to pain medication is appropriate per POSS sedation scale  6.  Follow pain management plan developed in collaboration with patient and interdisciplinary team (including palliative care or pain specialists if applicable)  Outcome: Not Progressing     Problem: Physical Regulation  Goal: Diagnostic test results will improve  Description: Target End Date:  Prior to discharge or change in level of care    1.  Monitor lactic acid levels  2.  Monitor ABG's  3.  Monitor diagnostic test results  Outcome: Not Progressing  Goal: Signs and symptoms of infection will decrease  Description: Target End Date:  Prior to discharge or change in level of care    1.  Remove potential routes of infection, such as central lines and urinary catheter  2.  Follow facility protocol for changing IV tubing and sites  3.  Collaborate with Infectious Disease  4.  Antibiotic therapy per provider order  5.  Note drug effects and monitor for antibiotic toxicity  Outcome: Not Progressing

## 2024-02-26 NOTE — CARE PLAN
The patient is Stable - Low risk of patient condition declining or worsening    Shift Goals  Clinical Goals: up to chair  Patient Goals: feel better, stop vomit    Progress made toward(s) clinical / shift goals:  goals met    Patient is not progressing towards the following goals:      Problem: Pain - Standard  Goal: Alleviation of pain or a reduction in pain to the patient’s comfort goal  Outcome: Progressing     Problem: Knowledge Deficit - Standard  Goal: Patient and family/care givers will demonstrate understanding of plan of care, disease process/condition, diagnostic tests and medications  Outcome: Progressing     Problem: Fall Risk  Goal: Patient will remain free from falls  Outcome: Progressing     Problem: Hemodynamics  Goal: Patient's hemodynamics, fluid balance and neurologic status will be stable or improve  Outcome: Progressing     Problem: Fluid Volume  Goal: Fluid volume balance will be maintained  Outcome: Progressing     Problem: Urinary - Renal Perfusion  Goal: Ability to achieve and maintain adequate renal perfusion and functioning will improve  Outcome: Progressing     Problem: Respiratory  Goal: Patient will achieve/maintain optimum respiratory ventilation and gas exchange  Outcome: Progressing     Problem: Physical Regulation  Goal: Diagnostic test results will improve  Outcome: Progressing  Goal: Signs and symptoms of infection will decrease  Outcome: Progressing

## 2024-02-27 ENCOUNTER — ANESTHESIA EVENT (OUTPATIENT)
Dept: SURGERY | Facility: MEDICAL CENTER | Age: 44
DRG: 380 | End: 2024-02-27
Payer: COMMERCIAL

## 2024-02-27 ENCOUNTER — ANESTHESIA (OUTPATIENT)
Dept: SURGERY | Facility: MEDICAL CENTER | Age: 44
DRG: 380 | End: 2024-02-27
Payer: COMMERCIAL

## 2024-02-27 LAB
GLUCOSE BLD STRIP.AUTO-MCNC: 151 MG/DL (ref 65–99)
GLUCOSE BLD STRIP.AUTO-MCNC: 161 MG/DL (ref 65–99)
GLUCOSE BLD STRIP.AUTO-MCNC: 169 MG/DL (ref 65–99)
GLUCOSE BLD STRIP.AUTO-MCNC: 88 MG/DL (ref 65–99)
GLUCOSE BLD STRIP.AUTO-MCNC: 99 MG/DL (ref 65–99)

## 2024-02-27 PROCEDURE — 700102 HCHG RX REV CODE 250 W/ 637 OVERRIDE(OP): Performed by: INTERNAL MEDICINE

## 2024-02-27 PROCEDURE — 700105 HCHG RX REV CODE 258: Performed by: ANESTHESIOLOGY

## 2024-02-27 PROCEDURE — A9270 NON-COVERED ITEM OR SERVICE: HCPCS | Performed by: INTERNAL MEDICINE

## 2024-02-27 PROCEDURE — 700111 HCHG RX REV CODE 636 W/ 250 OVERRIDE (IP): Performed by: ANESTHESIOLOGY

## 2024-02-27 PROCEDURE — 700102 HCHG RX REV CODE 250 W/ 637 OVERRIDE(OP): Performed by: NURSE PRACTITIONER

## 2024-02-27 PROCEDURE — 160202 HCHG ENDO MINUTES - 1ST 30 MINS LEVEL 3: Performed by: INTERNAL MEDICINE

## 2024-02-27 PROCEDURE — 43235 EGD DIAGNOSTIC BRUSH WASH: CPT | Performed by: INTERNAL MEDICINE

## 2024-02-27 PROCEDURE — 82962 GLUCOSE BLOOD TEST: CPT

## 2024-02-27 PROCEDURE — 700111 HCHG RX REV CODE 636 W/ 250 OVERRIDE (IP): Performed by: HOSPITALIST

## 2024-02-27 PROCEDURE — 99232 SBSQ HOSP IP/OBS MODERATE 35: CPT | Performed by: STUDENT IN AN ORGANIZED HEALTH CARE EDUCATION/TRAINING PROGRAM

## 2024-02-27 PROCEDURE — 160002 HCHG RECOVERY MINUTES (STAT): Performed by: INTERNAL MEDICINE

## 2024-02-27 PROCEDURE — 700105 HCHG RX REV CODE 258: Performed by: INTERNAL MEDICINE

## 2024-02-27 PROCEDURE — 160048 HCHG OR STATISTICAL LEVEL 1-5: Performed by: INTERNAL MEDICINE

## 2024-02-27 PROCEDURE — 160035 HCHG PACU - 1ST 60 MINS PHASE I: Performed by: INTERNAL MEDICINE

## 2024-02-27 PROCEDURE — 700102 HCHG RX REV CODE 250 W/ 637 OVERRIDE(OP): Performed by: HOSPITALIST

## 2024-02-27 PROCEDURE — A9270 NON-COVERED ITEM OR SERVICE: HCPCS | Performed by: HOSPITALIST

## 2024-02-27 PROCEDURE — 770020 HCHG ROOM/CARE - TELE (206)

## 2024-02-27 PROCEDURE — C9113 INJ PANTOPRAZOLE SODIUM, VIA: HCPCS | Performed by: HOSPITALIST

## 2024-02-27 PROCEDURE — 700111 HCHG RX REV CODE 636 W/ 250 OVERRIDE (IP): Performed by: INTERNAL MEDICINE

## 2024-02-27 PROCEDURE — A9270 NON-COVERED ITEM OR SERVICE: HCPCS | Performed by: NURSE PRACTITIONER

## 2024-02-27 PROCEDURE — 160009 HCHG ANES TIME/MIN: Performed by: INTERNAL MEDICINE

## 2024-02-27 PROCEDURE — 0DJ08ZZ INSPECTION OF UPPER INTESTINAL TRACT, VIA NATURAL OR ARTIFICIAL OPENING ENDOSCOPIC: ICD-10-PCS | Performed by: INTERNAL MEDICINE

## 2024-02-27 RX ORDER — SODIUM CHLORIDE, SODIUM LACTATE, POTASSIUM CHLORIDE, CALCIUM CHLORIDE 600; 310; 30; 20 MG/100ML; MG/100ML; MG/100ML; MG/100ML
INJECTION, SOLUTION INTRAVENOUS CONTINUOUS
Status: DISCONTINUED | OUTPATIENT
Start: 2024-02-27 | End: 2024-02-27

## 2024-02-27 RX ORDER — ACETAMINOPHEN 500 MG
1000 TABLET ORAL 3 TIMES DAILY
Status: DISCONTINUED | OUTPATIENT
Start: 2024-02-27 | End: 2024-02-28 | Stop reason: HOSPADM

## 2024-02-27 RX ORDER — OXYMETAZOLINE HYDROCHLORIDE 0.05 G/100ML
2 SPRAY NASAL 2 TIMES DAILY
Status: DISCONTINUED | OUTPATIENT
Start: 2024-02-27 | End: 2024-02-28 | Stop reason: HOSPADM

## 2024-02-27 RX ORDER — OMEPRAZOLE 20 MG/1
20 CAPSULE, DELAYED RELEASE ORAL 2 TIMES DAILY
Status: DISCONTINUED | OUTPATIENT
Start: 2024-02-27 | End: 2024-02-28 | Stop reason: HOSPADM

## 2024-02-27 RX ORDER — OXYCODONE HYDROCHLORIDE 5 MG/1
5 TABLET ORAL EVERY 6 HOURS PRN
Status: DISCONTINUED | OUTPATIENT
Start: 2024-02-27 | End: 2024-02-28 | Stop reason: HOSPADM

## 2024-02-27 RX ORDER — SODIUM CHLORIDE, SODIUM LACTATE, POTASSIUM CHLORIDE, CALCIUM CHLORIDE 600; 310; 30; 20 MG/100ML; MG/100ML; MG/100ML; MG/100ML
INJECTION, SOLUTION INTRAVENOUS
Status: DISCONTINUED | OUTPATIENT
Start: 2024-02-27 | End: 2024-02-27 | Stop reason: SURG

## 2024-02-27 RX ORDER — HYDROMORPHONE HYDROCHLORIDE 1 MG/ML
0.5 INJECTION, SOLUTION INTRAMUSCULAR; INTRAVENOUS; SUBCUTANEOUS EVERY 8 HOURS PRN
Status: DISCONTINUED | OUTPATIENT
Start: 2024-02-27 | End: 2024-02-28

## 2024-02-27 RX ADMIN — SUCRALFATE 1 G: 1 SUSPENSION ORAL at 11:56

## 2024-02-27 RX ADMIN — HYDROMORPHONE HYDROCHLORIDE 0.5 MG: 1 INJECTION, SOLUTION INTRAMUSCULAR; INTRAVENOUS; SUBCUTANEOUS at 04:27

## 2024-02-27 RX ADMIN — OXYMETAZOLINE HCL 2 SPRAY: 0.05 SPRAY NASAL at 22:31

## 2024-02-27 RX ADMIN — OMEPRAZOLE 20 MG: 20 CAPSULE, DELAYED RELEASE ORAL at 10:34

## 2024-02-27 RX ADMIN — INSULIN GLARGINE-YFGN 6 UNITS: 100 INJECTION, SOLUTION SUBCUTANEOUS at 04:24

## 2024-02-27 RX ADMIN — SUCRALFATE 1 G: 1 SUSPENSION ORAL at 23:31

## 2024-02-27 RX ADMIN — SODIUM CHLORIDE, POTASSIUM CHLORIDE, SODIUM LACTATE AND CALCIUM CHLORIDE: 600; 310; 30; 20 INJECTION, SOLUTION INTRAVENOUS at 08:45

## 2024-02-27 RX ADMIN — OMEPRAZOLE 20 MG: 20 CAPSULE, DELAYED RELEASE ORAL at 17:07

## 2024-02-27 RX ADMIN — SODIUM CHLORIDE, POTASSIUM CHLORIDE, SODIUM LACTATE AND CALCIUM CHLORIDE: 600; 310; 30; 20 INJECTION, SOLUTION INTRAVENOUS at 09:13

## 2024-02-27 RX ADMIN — SUCRALFATE 1 G: 1 SUSPENSION ORAL at 17:07

## 2024-02-27 RX ADMIN — PROPOFOL 200 MCG/KG/MIN: 10 INJECTION, EMULSION INTRAVENOUS at 09:14

## 2024-02-27 RX ADMIN — AMLODIPINE BESYLATE 10 MG: 10 TABLET ORAL at 04:18

## 2024-02-27 RX ADMIN — PANTOPRAZOLE SODIUM 40 MG: 40 INJECTION, POWDER, FOR SOLUTION INTRAVENOUS at 04:18

## 2024-02-27 RX ADMIN — DEXTROSE AND SODIUM CHLORIDE: 5; 900 INJECTION, SOLUTION INTRAVENOUS at 04:27

## 2024-02-27 RX ADMIN — INSULIN LISPRO 1 UNITS: 100 INJECTION, SOLUTION INTRAVENOUS; SUBCUTANEOUS at 17:00

## 2024-02-27 RX ADMIN — INSULIN GLARGINE-YFGN 6 UNITS: 100 INJECTION, SOLUTION SUBCUTANEOUS at 17:02

## 2024-02-27 RX ADMIN — HYDROMORPHONE HYDROCHLORIDE 0.5 MG: 1 INJECTION, SOLUTION INTRAMUSCULAR; INTRAVENOUS; SUBCUTANEOUS at 12:01

## 2024-02-27 ASSESSMENT — COGNITIVE AND FUNCTIONAL STATUS - GENERAL
SUGGESTED CMS G CODE MODIFIER MOBILITY: CH
MOBILITY SCORE: 24
SUGGESTED CMS G CODE MODIFIER DAILY ACTIVITY: CH
DAILY ACTIVITIY SCORE: 24

## 2024-02-27 ASSESSMENT — ENCOUNTER SYMPTOMS
EYE REDNESS: 0
FEVER: 0
VOMITING: 1
FOCAL WEAKNESS: 0
EYE DISCHARGE: 0
SHORTNESS OF BREATH: 0
NERVOUS/ANXIOUS: 0
COUGH: 0
BRUISES/BLEEDS EASILY: 0
STRIDOR: 0
ABDOMINAL PAIN: 1
NAUSEA: 1
FLANK PAIN: 0
ROS GI COMMENTS: HEMATEMESIS
CHILLS: 0
MYALGIAS: 0

## 2024-02-27 ASSESSMENT — PAIN DESCRIPTION - PAIN TYPE
TYPE: ACUTE PAIN
TYPE: ACUTE PAIN
TYPE: SURGICAL PAIN
TYPE: ACUTE PAIN
TYPE: SURGICAL PAIN
TYPE: SURGICAL PAIN

## 2024-02-27 ASSESSMENT — FIBROSIS 4 INDEX: FIB4 SCORE: 1.08

## 2024-02-27 ASSESSMENT — PAIN SCALES - GENERAL: PAIN_LEVEL: 0

## 2024-02-27 NOTE — ANESTHESIA TIME REPORT
Anesthesia Start and Stop Event Times       Date Time Event    2/27/2024 0851 Ready for Procedure     0913 Anesthesia Start     0925 Anesthesia Stop          Responsible Staff  02/27/24      Name Role Begin End    Dougie Chatterjee M.D. Anesth 0913 0925          Overtime Reason:  no overtime (within assigned shift)    Comments:

## 2024-02-27 NOTE — PROGRESS NOTES
Assumed care on patient post bedside report. Alert and oriented x 4. On RA and tolerating well. Denies pain or n/v.    Tele on and heart rhythm and rate checked on monitor.    Plan of care discussed. Monitor v/s, lab values, npo after MN for a possible EGD and manage pain.    Safety precautions in place are nonskid socks on, side rails up x 2, bed to lowest level, alarms on. Appropriate to use call light for needs and assistance. Will round hourly and as needed.

## 2024-02-27 NOTE — PROCEDURES
OPERATIVE REPORT    PATIENT:   Jose De Jesus Banda   1980       PREOPERATIVE DIAGNOSES/INDICATIONS: odynophagia, hematemesis    POSTOPERATIVE DIAGNOSIS: LA class C erosive esophagitis    PROCEDURE:  ESOPHAGOGASTRODUODENOSCOPY    PHYSICIAN:  Cece Ding MD    ANESTHESIA:  Per anesthesiologist.    LOCATION: [facility]    CONSENT:  OBTAINED. The risks, benefits and alternatives of the procedure were discussed in details. The risks include and are not limited to bleeding, infection, perforation, missed lesions, and sedations risks (cardiopulmonary compromise and allergic reaction to medications).    DESCRIPTION: The patient presented to the procedure room.  After routine checkup was performed, patient was brought into the endoscopy suite.  Patient was placed on his left lateral decubitus position. A bite block was placed in patient's mouth. Patient was sedated by anesthesia.  Vital signs were monitored throughout the procedure.  Oxygenation support was provided throughout the procedure. Upper endoscope was inserted into patient's mouth and advanced to the second portion of the duodenum under direct visualization.      Once the site was reached and examined, the upper endoscope was withdrawn.  Retroflexion was made within the stomach.  The stomach was decompressed, scope was withdrawn and the procedure was terminated.     The patient tolerated the procedure well.  There were no immediate complications.    OPERATIVE FINDINGS:    1. Esophagus: linear erosions 30-40 cm, not circumferential  2. Stomach: patchy erythema in fundus, no erosions or ulcers  3. Duodenum: normal    RECOMMENDATIONS:  --Carafate slurry QID x 2 weeks  --PO PPI BID x 8 weeks then once daily  --Recommend outpatient GI follow up for EGD to ensure no Wall's once esophagitis healed  --Full liq diet and adv as tri  --Signing off.  Please call if we can be of assistance

## 2024-02-27 NOTE — OR NURSING
0923 Patient arrived to PACU. Report received from anesthesia and OR RN. Patient on 6L of oxygen via mask. Placed on monitor. Patient sleeping.     0941 Patient tolerating sips of water. Dr. Ding at bedside to update patient. Patient declines any pain or nausea.     1008 Report given to Odilia HENDERSON. Updated patients wife. Patient transported back to room on monitor.

## 2024-02-27 NOTE — CARE PLAN
The patient is Stable - Low risk of patient condition declining or worsening    Shift Goals  Clinical Goals: vss, manage pain and n/v  Patient Goals: no n/v    Progress made toward(s) clinical / shift goals:        Patient is not progressing towards the following goals:      Problem: Pain - Standard  Goal: Alleviation of pain or a reduction in pain to the patient’s comfort goal  Description: Target End Date:  Prior to discharge or change in level of care    Document on Vitals flowsheet    1.  Document pain using the appropriate pain scale per order or unit policy  2.  Educate and implement non-pharmacologic comfort measures (i.e. relaxation, distraction, massage, cold/heat therapy, etc.)  3.  Pain management medications as ordered  4.  Reassess pain after pain med administration per policy  5.  If opiods administered assess patient's response to pain medication is appropriate per POSS sedation scale  6.  Follow pain management plan developed in collaboration with patient and interdisciplinary team (including palliative care or pain specialists if applicable)  Outcome: Not Progressing     Problem: Physical Regulation  Goal: Diagnostic test results will improve  Description: Target End Date:  Prior to discharge or change in level of care    1.  Monitor lactic acid levels  2.  Monitor ABG's  3.  Monitor diagnostic test results  Outcome: Not Progressing  Goal: Signs and symptoms of infection will decrease  Description: Target End Date:  Prior to discharge or change in level of care    1.  Remove potential routes of infection, such as central lines and urinary catheter  2.  Follow facility protocol for changing IV tubing and sites  3.  Collaborate with Infectious Disease  4.  Antibiotic therapy per provider order  5.  Note drug effects and monitor for antibiotic toxicity  Outcome: Not Progressing

## 2024-02-27 NOTE — ANESTHESIA POSTPROCEDURE EVALUATION
Patient: Jose De Jesus Banda    Procedure Summary       Date: 02/27/24 Room / Location: [facility] ROOM 26 / SURGERY SAME DAY [facility]    Anesthesia Start: 0913 Anesthesia Stop: 0925    Procedure: GASTROSCOPY (Esophagus) Diagnosis: (Erosive Esophagitis,)    Surgeons: Cece Ding M.D. Responsible Provider: Dougie Chatterjee M.D.    Anesthesia Type: general ASA Status: 3            Final Anesthesia Type: general  Last vitals  BP   Blood Pressure: 106/58    Temp   37 °C (98.6 °F)    Pulse   75   Resp   (!) 22    SpO2   100 %      Anesthesia Post Evaluation    Patient location during evaluation: PACU  Patient participation: complete - patient participated  Level of consciousness: awake and alert  Pain score: 0    Airway patency: patent  Anesthetic complications: no  Cardiovascular status: adequate and hemodynamically stable  Respiratory status: acceptable  Hydration status: acceptable    PONV: none          No notable events documented.     Nurse Pain Score: 3 (NPRS)

## 2024-02-27 NOTE — ANESTHESIA PREPROCEDURE EVALUATION
Case: [id number] Date/Time: 02/27/24 0930    Procedure: GASTROSCOPY (Esophagus)    Anesthesia type: MAC    Pre-op diagnosis: hematemesis    Location: [facility] ROOM 26 / SURGERY SAME DAY [facility]    Surgeons: Cece Ding M.D.            Relevant Problems   ENDO   (positive) Controlled type 2 diabetes mellitus without complication, without long-term current use of insulin (HCC)   (positive) Type 2 diabetes mellitus with hyperglycemia, without long-term current use of insulin (HCC)       Physical Exam    Airway   Mallampati: II  TM distance: >3 FB  Neck ROM: full       Cardiovascular - normal exam  Rhythm: regular  Rate: normal  (-) murmur     Dental - normal exam           Pulmonary - normal exam  Breath sounds clear to auscultation     Abdominal    Neurological - normal exam                   Anesthesia Plan    ASA 3       Plan - general       Airway plan will be natural airway          Induction: intravenous    Postoperative Plan: Postoperative administration of opioids is intended.    Pertinent diagnostic labs and testing reviewed    Informed Consent:    Anesthetic plan and risks discussed with patient.    Use of blood products discussed with: patient whom consented to blood products.

## 2024-02-27 NOTE — CARE PLAN
Problem: Pain - Standard  Goal: Alleviation of pain or a reduction in pain to the patient’s comfort goal  Outcome: Progressing     Problem: Knowledge Deficit - Standard  Goal: Patient and family/care givers will demonstrate understanding of plan of care, disease process/condition, diagnostic tests and medications  Outcome: Progressing   The patient is Stable - Low risk of patient condition declining or worsening    Shift Goals  Clinical Goals: EGD, pain control  Patient Goals: no N/v  Family Goals: updates    Progress made toward(s) clinical / shift goals:  yes    Patient is not progressing towards the following goals:

## 2024-02-27 NOTE — PROGRESS NOTES
Hospital Medicine Daily Progress Note    Date of Service  2/27/2024    Chief Complaint  Jose De Jesus Banda is a 43 y.o. male admitted 2/24/2024 with N/V    Hospital Course  This is  a 43 y.o. male with a past medical history of type 2 diabetes who presented 2/24/2024 with nausea, vomiting and abdominal pain.  The patient reports that has not been feeling well since yesterday.  Has been having progressively worsening generalized weakness, abdominal pain nausea and vomiting   Patient found to have elevated blood sugars initially concern for DKA   Patient admitted for further treatment     Patient seen and examined, still having nausea, and some vomiting, afebrile, no chest pain or SOB  Cont on antiemetics and IV fluid.  Monitor electrolytes   2/26: Patient seen and examined, afebrile still as per patient unable to keep anything down and having nausea or vomiting. Checking a barium swallow study and I have also consulted GI   Appreciate rec.     Gastroenterology following for which patient underwent EGD on 2/27/2024 and was noted for esophagitis with linear erosions.    Interval Problem Update  Evaluate bedside  Still having some epigastric pain  SBP in the 150s  On room air  No leukocytosis today  Hemoglobin stable 14-15  Status post EGD  DC IV fluids  Full liquid diet, escalate as tolerable  Continue Carafate  Oral PPI twice daily  Gastroenterology following, appreciate recommendations  Up to chair for all meals  Labs on AM    I have discussed this patient's plan of care and discharge plan at IDT rounds today with Case Management, Nursing, Nursing leadership, and other members of the IDT team.    Consultants/Specialty  None     Code Status  Full Code    Disposition  The patient is not medically cleared for discharge to home or a post-acute facility.       I have placed the appropriate orders for post-discharge needs.    Review of Systems  Review of Systems   Constitutional:  Positive for malaise/fatigue. Negative  for chills and fever.   Eyes:  Negative for discharge and redness.   Respiratory:  Negative for cough, shortness of breath and stridor.    Cardiovascular:  Negative for chest pain and leg swelling.   Gastrointestinal:  Positive for abdominal pain, nausea and vomiting.        Hematemesis   Genitourinary:  Negative for flank pain.   Musculoskeletal:  Negative for myalgias.   Skin: Negative.    Neurological:  Negative for focal weakness.   Endo/Heme/Allergies:  Does not bruise/bleed easily.   Psychiatric/Behavioral:  The patient is not nervous/anxious.         Physical Exam  Temp:  [36 °C (96.8 °F)-37.5 °C (99.5 °F)] 36.6 °C (97.9 °F)  Pulse:  [75-99] 86  Resp:  [14-33] 16  BP: (104-162)/(58-98) 153/97  SpO2:  [96 %-100 %] 99 %    Physical Exam  Constitutional:       General: He is not in acute distress.     Appearance: He is ill-appearing and diaphoretic.   HENT:      Head: Atraumatic.      Right Ear: External ear normal.      Left Ear: External ear normal.      Nose: No congestion or rhinorrhea.      Mouth/Throat:      Mouth: Mucous membranes are dry.   Eyes:      General: No scleral icterus.        Right eye: No discharge.         Left eye: No discharge.      Pupils: Pupils are equal, round, and reactive to light.   Cardiovascular:      Rate and Rhythm: Regular rhythm. Tachycardia present.   Pulmonary:      Effort: Pulmonary effort is normal.   Abdominal:      General: There is no distension.   Musculoskeletal:      Cervical back: Neck supple. No rigidity. No muscular tenderness.      Right lower leg: No edema.      Left lower leg: No edema.   Skin:     Capillary Refill: Capillary refill takes 2 to 3 seconds.      Coloration: Skin is pale. Skin is not jaundiced.   Neurological:      Mental Status: He is alert and oriented to person, place, and time.      Coordination: Coordination normal.   Psychiatric:         Mood and Affect: Mood normal.         Behavior: Behavior normal.         Fluids    Intake/Output Summary  (Last 24 hours) at 2/27/2024 1242  Last data filed at 2/27/2024 0925  Gross per 24 hour   Intake 915.05 ml   Output --   Net 915.05 ml       Laboratory  Recent Labs     02/25/24  0205 02/25/24  0924 02/26/24  0240   WBC 11.7* 11.9* 7.0   RBC 4.80 4.90 4.56*   HEMOGLOBIN 14.9 15.2 14.3   HEMATOCRIT 42.0 43.6 41.0*   MCV 87.5 89.0 89.9   MCH 31.0 31.0 31.4   MCHC 35.5 34.9 34.9   RDW 42.7 43.7 44.1   PLATELETCT 197 184 167   MPV 9.8 9.7 9.8     Recent Labs     02/24/24 2053 02/25/24  0205 02/26/24  0240   SODIUM 139 141 138   POTASSIUM 4.6 4.3 3.9   CHLORIDE 104 106 105   CO2 16* 20 20   GLUCOSE 161* 145* 168*   BUN 21 21 17   CREATININE 1.14 1.00 0.86   CALCIUM 9.4 9.0 8.3*             Recent Labs     02/25/24  0205   TRIGLYCERIDE 71   HDL 70   LDL 77       Imaging  DX-ESOPH. FLUORO (BARIUM SWALLOW)   Final Result      No abnormalities are noted on thoracic esophogram.      EC-ECHOCARDIOGRAM COMPLETE W/O CONT   Final Result      CT-ABDOMEN-PELVIS WITH   Final Result      1.  Mild wall thickening and edema in the distal esophagus.      2.  Otherwise no acute abnormalities are identified in the abdomen or pelvis.      DX-CHEST-PORTABLE (1 VIEW)   Final Result         No acute cardiac or pulmonary abnormality is identified.           Assessment/Plan  * Diabetic ketosis (HCC)- (present on admission)  Assessment & Plan  Has hyperglycemia and ketosis without acidosis  Blood pH is within normal limits despite the low bicarb level.  This is likely due to mixed metabolic alkalosis due to excessive vomiting.  Last glycated hemoglobin was 7%  Cont on ssi and long actin insulin  DKA improved     Abnormal EKG- (present on admission)  Assessment & Plan  EKG shows sinus tachycardia with a rate of 99, there is no ST elevation, there is ST depression in leads II, 3, aVF, leads V3-V6.    Troponin negative  Echo pending       Elevated blood pressure reading- (present on admission)  Assessment & Plan  No known history of primary  hypertension  Still not well controlled so adding amlodipine to his regimen     High anion gap metabolic disturbance- (present on admission)  Assessment & Plan  Blood pH is within normal limits despite the low bicarb level.  This is likely due to mixed metabolic alkalosis due to excessive vomiting.  Improved with IVF     Esophagitis- (present on admission)  Assessment & Plan  CT imaging show evidence for edema in the lower esophagus  I will start intravenous pantoprazole.  I will start sucralfate    Hematemesis- (present on admission)  Assessment & Plan  Likely secondary to esophagitis  CT imaging show evidence for edema in the lower esophagus  Has resolved    hemoglobin stable     Type 2 diabetes mellitus with hyperglycemia, without long-term current use of insulin (HCC)- (present on admission)  Assessment & Plan  With hyperglycemia and ketosis without acidosis  Last glycated hemoglobin was 7%  Cont on SSI and lantus   Adjust according to blood sugars trend     Dehydration- (present on admission)  Assessment & Plan  Likely secondary to reduced oral intake, increase in losses secondary to vomiting, and increase in insensible loss due to osmotic diuresis  Encourage oral intake as tolerated, antiemetics as needed.  Intravenous hydration until adequate oral intake is achieved.     Dyslipidemia- (present on admission)  Assessment & Plan  Cardiac diet when able to take orally         VTE prophylaxis:scd     I have performed a physical exam and reviewed and updated ROS and Plan today (2/27/2024). In review of yesterday's note (2/26/2024), there are no changes except as documented above.    Greater than 51 minutes spent prepping to see patient (e.g. review of tests) obtaining and/or reviewing separately obtained history. Performing a medically appropriate examination and/ evaluation.  Counseling and educating the patient/family/caregiver.  Ordering medications, tests, or procedures.  Referring and communicating with other  health care professionals.  Documenting clinical information in EPIC.  Independently interpreting results and communicating results to patient/family/caregiver.  Care coordination.

## 2024-02-28 ENCOUNTER — PHARMACY VISIT (OUTPATIENT)
Dept: PHARMACY | Facility: MEDICAL CENTER | Age: 44
End: 2024-02-28
Payer: COMMERCIAL

## 2024-02-28 VITALS
BODY MASS INDEX: 21.33 KG/M2 | HEART RATE: 67 BPM | TEMPERATURE: 97.5 F | OXYGEN SATURATION: 99 % | RESPIRATION RATE: 18 BRPM | WEIGHT: 160.94 LBS | DIASTOLIC BLOOD PRESSURE: 92 MMHG | HEIGHT: 73 IN | SYSTOLIC BLOOD PRESSURE: 137 MMHG

## 2024-02-28 LAB
ALBUMIN SERPL BCP-MCNC: 3.7 G/DL (ref 3.2–4.9)
ALBUMIN/GLOB SERPL: 1.5 G/DL
ALP SERPL-CCNC: 40 U/L (ref 30–99)
ALT SERPL-CCNC: 17 U/L (ref 2–50)
ANION GAP SERPL CALC-SCNC: 12 MMOL/L (ref 7–16)
AST SERPL-CCNC: 20 U/L (ref 12–45)
BILIRUB SERPL-MCNC: 0.6 MG/DL (ref 0.1–1.5)
BUN SERPL-MCNC: 10 MG/DL (ref 8–22)
CALCIUM ALBUM COR SERPL-MCNC: 8.4 MG/DL (ref 8.5–10.5)
CALCIUM SERPL-MCNC: 8.2 MG/DL (ref 8.5–10.5)
CHLORIDE SERPL-SCNC: 103 MMOL/L (ref 96–112)
CO2 SERPL-SCNC: 23 MMOL/L (ref 20–33)
CREAT SERPL-MCNC: 0.69 MG/DL (ref 0.5–1.4)
ERYTHROCYTE [DISTWIDTH] IN BLOOD BY AUTOMATED COUNT: 39.5 FL (ref 35.9–50)
GFR SERPLBLD CREATININE-BSD FMLA CKD-EPI: 119 ML/MIN/1.73 M 2
GLOBULIN SER CALC-MCNC: 2.5 G/DL (ref 1.9–3.5)
GLUCOSE BLD STRIP.AUTO-MCNC: 117 MG/DL (ref 65–99)
GLUCOSE BLD STRIP.AUTO-MCNC: 118 MG/DL (ref 65–99)
GLUCOSE SERPL-MCNC: 109 MG/DL (ref 65–99)
HCT VFR BLD AUTO: 41.9 % (ref 42–52)
HGB BLD-MCNC: 15.3 G/DL (ref 14–18)
MAGNESIUM SERPL-MCNC: 1.8 MG/DL (ref 1.5–2.5)
MCH RBC QN AUTO: 31.4 PG (ref 27–33)
MCHC RBC AUTO-ENTMCNC: 36.5 G/DL (ref 32.3–36.5)
MCV RBC AUTO: 85.9 FL (ref 81.4–97.8)
PHOSPHATE SERPL-MCNC: 3 MG/DL (ref 2.5–4.5)
PLATELET # BLD AUTO: 177 K/UL (ref 164–446)
PMV BLD AUTO: 9.7 FL (ref 9–12.9)
POTASSIUM SERPL-SCNC: 3.2 MMOL/L (ref 3.6–5.5)
PROT SERPL-MCNC: 6.2 G/DL (ref 6–8.2)
RBC # BLD AUTO: 4.88 M/UL (ref 4.7–6.1)
SODIUM SERPL-SCNC: 138 MMOL/L (ref 135–145)
WBC # BLD AUTO: 5.5 K/UL (ref 4.8–10.8)

## 2024-02-28 PROCEDURE — 700102 HCHG RX REV CODE 250 W/ 637 OVERRIDE(OP): Performed by: HOSPITALIST

## 2024-02-28 PROCEDURE — A9270 NON-COVERED ITEM OR SERVICE: HCPCS | Performed by: INTERNAL MEDICINE

## 2024-02-28 PROCEDURE — 700102 HCHG RX REV CODE 250 W/ 637 OVERRIDE(OP): Mod: JZ | Performed by: STUDENT IN AN ORGANIZED HEALTH CARE EDUCATION/TRAINING PROGRAM

## 2024-02-28 PROCEDURE — RXMED WILLOW AMBULATORY MEDICATION CHARGE: Performed by: STUDENT IN AN ORGANIZED HEALTH CARE EDUCATION/TRAINING PROGRAM

## 2024-02-28 PROCEDURE — A9270 NON-COVERED ITEM OR SERVICE: HCPCS | Performed by: HOSPITALIST

## 2024-02-28 PROCEDURE — 85027 COMPLETE CBC AUTOMATED: CPT

## 2024-02-28 PROCEDURE — 82962 GLUCOSE BLOOD TEST: CPT

## 2024-02-28 PROCEDURE — 99239 HOSP IP/OBS DSCHRG MGMT >30: CPT | Performed by: STUDENT IN AN ORGANIZED HEALTH CARE EDUCATION/TRAINING PROGRAM

## 2024-02-28 PROCEDURE — 80053 COMPREHEN METABOLIC PANEL: CPT

## 2024-02-28 PROCEDURE — A9270 NON-COVERED ITEM OR SERVICE: HCPCS | Mod: JZ | Performed by: STUDENT IN AN ORGANIZED HEALTH CARE EDUCATION/TRAINING PROGRAM

## 2024-02-28 PROCEDURE — 700102 HCHG RX REV CODE 250 W/ 637 OVERRIDE(OP): Performed by: INTERNAL MEDICINE

## 2024-02-28 PROCEDURE — 84100 ASSAY OF PHOSPHORUS: CPT

## 2024-02-28 PROCEDURE — 83735 ASSAY OF MAGNESIUM: CPT

## 2024-02-28 RX ORDER — AMLODIPINE BESYLATE 10 MG/1
10 TABLET ORAL DAILY
Qty: 30 TABLET | Refills: 0 | Status: SHIPPED | OUTPATIENT
Start: 2024-02-29

## 2024-02-28 RX ORDER — SUCRALFATE ORAL 1 G/10ML
1 SUSPENSION ORAL EVERY 6 HOURS
Qty: 520 ML | Refills: 0 | Status: SHIPPED | OUTPATIENT
Start: 2024-02-28 | End: 2024-03-12

## 2024-02-28 RX ORDER — POTASSIUM CHLORIDE 20 MEQ/1
40 TABLET, EXTENDED RELEASE ORAL ONCE
Status: COMPLETED | OUTPATIENT
Start: 2024-02-28 | End: 2024-02-28

## 2024-02-28 RX ORDER — OMEPRAZOLE 20 MG/1
20 CAPSULE, DELAYED RELEASE ORAL 2 TIMES DAILY
Qty: 60 CAPSULE | Refills: 1 | Status: SHIPPED | OUTPATIENT
Start: 2024-02-28

## 2024-02-28 RX ADMIN — POTASSIUM CHLORIDE 40 MEQ: 1500 TABLET, EXTENDED RELEASE ORAL at 07:45

## 2024-02-28 RX ADMIN — INSULIN GLARGINE-YFGN 6 UNITS: 100 INJECTION, SOLUTION SUBCUTANEOUS at 05:51

## 2024-02-28 RX ADMIN — SUCRALFATE 1 G: 1 SUSPENSION ORAL at 05:43

## 2024-02-28 RX ADMIN — OMEPRAZOLE 20 MG: 20 CAPSULE, DELAYED RELEASE ORAL at 05:43

## 2024-02-28 RX ADMIN — AMLODIPINE BESYLATE 10 MG: 10 TABLET ORAL at 05:43

## 2024-02-28 ASSESSMENT — FIBROSIS 4 INDEX: FIB4 SCORE: 1.12

## 2024-02-28 NOTE — DISCHARGE SUMMARY
Discharge Summary    CHIEF COMPLAINT ON ADMISSION  Chief Complaint   Patient presents with    N/V     Since yesterday afternoon. Pt c/o red and brown emesis. Pt states is started with abdominal pain then progressed to vomiting.        Reason for Admission  Vomiting     Admission Date  2/24/2024    CODE STATUS  Full Code    HPI & HOSPITAL COURSE  43-year-old male with a past medical history of type 2 diabetes was admitted on 2/24/2024 for diabetic ketoacidosis and hematemesis. He was started on long and short acting insulin as well as IV hydration.  He was started on clear liquid diet and IV PPI as well as sucralfate. DKA resolved. Gastroenterology following for which patient ultimately underwent EGD on 2/27/2024 and was noted for esophagitis with linear erosions.  He was transition to an oral omeprazole regimen.  Diet was escalated for which he tolerated well.  As per gastroenterology patient is to continue Carafate for 2 weeks and oral proton pump inhibitor for 8 weeks which was prescribed prior to discharge.  Patient was referred to outpatient gastroenterology.  Stable patient with in chronic condition was discharged home and instructed to follow-up with his primary care provider and gastroenterologist in the outpatient setting.  All results and plan of action discussed with the patient for she voiced understanding agreement with the primary care team.  Patient was instructed to return to emergency department symptoms were to worsen.    Therefore, he is discharged in good and stable condition to home with close outpatient follow-up.    The patient met 2-midnight criteria for an inpatient stay at the time of discharge.    Discharge Date  2/28/2024    FOLLOW UP ITEMS POST DISCHARGE  Primary care provider follow posthospital discharge care  Outpatient gastroenterology to monitor esophagitis and scheduled repeat EGD    DISCHARGE DIAGNOSES  Principal Problem:    Diabetic ketosis (HCC) (POA: Yes)  Active Problems:     Dyslipidemia (POA: Yes)    Dehydration (POA: Yes)    Type 2 diabetes mellitus with hyperglycemia, without long-term current use of insulin (HCC) (POA: Yes)    Hematemesis (POA: Yes)    Esophagitis (POA: Yes)    High anion gap metabolic disturbance (POA: Yes)    Elevated blood pressure reading (POA: Yes)    Abnormal EKG (POA: Yes)  Resolved Problems:    * No resolved hospital problems. *      FOLLOW UP  Future Appointments   Date Time Provider Department Center   7/25/2024 10:00 AM NELLA Sanabria     No follow-up provider specified.    MEDICATIONS ON DISCHARGE     Medication List        START taking these medications        Instructions   amLODIPine 10 MG Tabs  Start taking on: February 29, 2024  Commonly known as: Norvasc   Take 1 Tablet by mouth every day.  Dose: 10 mg     omeprazole 20 MG delayed-release capsule  Commonly known as: PriLOSEC   Take 1 Capsule by mouth 2 times a day.  Dose: 20 mg     sucralfate 1 GM/10ML Susp  Commonly known as: Carafate   Take 10 mL by mouth every 6 hours for 13 days.  Dose: 1 g            CHANGE how you take these medications        Instructions   glycopyrrolate 1 MG Tabs  What changed: when to take this  Commonly known as: Robinul   TAKE 1 TABLET BY MOUTH THREE TIMES A DAY            CONTINUE taking these medications        Instructions   alpha-lipoic acid 600 MG Caps   Take 600 mg by mouth every day at 6 PM.  Dose: 600 mg     DHEA 50 MG Caps   Take 100 mg by mouth every day.  Dose: 100 mg     fluticasone 50 MCG/ACT nasal spray  Commonly known as: Flonase   Spray 1 Spray in nose every day.  Dose: 1 Spray     glimepiride 4 MG Tabs  Commonly known as: Amaryl   TAKE 1 TABLET BY MOUTH BEFORE EVENING MEAL.  Dose: 4 mg     MULTIVITAMIN ADULT PO   Take 1 Tablet by mouth every day.  Dose: 1 Tablet     Ozempic (0.25 or 0.5 MG/DOSE) 2 MG/3ML Sopn  Generic drug: Semaglutide(0.25 or 0.5MG/DOS)   INJECT 0.5MG UNDER THE SKIN EVERY 7 DAYS  Dose: 0.5 mg     pioglitazone 30  MG Tabs  Commonly known as: Actos   TAKE 1 TABLET BY MOUTH EVERY DAY  Dose: 30 mg     THEODORA-E PO   Take 1 Tablet by mouth every day.  Dose: 1 Tablet     Synjardy XR 12.5-1000 MG Tb24  Generic drug: Empagliflozin-metFORMIN HCl ER   TAKE 2 TABLETS BY MOUTH EVERY DAY  Dose: 2 Tablet     Vitamin D3 5000 Unit (125 mcg) Tabs  Generic drug: vitamin D3   Take 5,000 Units by mouth every day.  Dose: 5,000 Units     Xyzal Allergy 24HR 5 MG Tabs  Generic drug: Levocetirizine Dihydrochloride   Take 5 mg by mouth every day.  Dose: 5 mg            STOP taking these medications      MILK THISTLE PLUS PO     NON SPECIFIED              Allergies  No Known Allergies    DIET  Orders Placed This Encounter   Procedures    Diet Order Diet: Low Fiber(GI Soft)     Standing Status:   Standing     Number of Occurrences:   1     Order Specific Question:   Diet:     Answer:   Low Fiber(GI Soft) [2]       ACTIVITY  As tolerated.  Weight bearing as tolerated    CONSULTATIONS  Gastroenterology  Critical care medicine    PROCEDURES  EGD    LABORATORY  Lab Results   Component Value Date    SODIUM 138 02/28/2024    POTASSIUM 3.2 (L) 02/28/2024    CHLORIDE 103 02/28/2024    CO2 23 02/28/2024    GLUCOSE 109 (H) 02/28/2024    BUN 10 02/28/2024    CREATININE 0.69 02/28/2024        Lab Results   Component Value Date    WBC 5.5 02/28/2024    HEMOGLOBIN 15.3 02/28/2024    HEMATOCRIT 41.9 (L) 02/28/2024    PLATELETCT 177 02/28/2024        Total time of the discharge process exceeds 35 minutes.

## 2024-02-28 NOTE — PROGRESS NOTES
Bedside report received. Pt A&Ox4. RA. POC discussed with pt. Pt verbalizes understanding. Call light and belongings within reach. Bed locked and in lowest position. Independent.

## 2024-02-28 NOTE — CARE PLAN
The patient is Stable - Low risk of patient condition declining or worsening    Shift Goals  Clinical Goals: Pain management, safety  Patient Goals: Sleep, comfort  Family Goals: GUERO    Progress made toward(s) clinical / shift goals:    Problem: Pain - Standard  Goal: Alleviation of pain or a reduction in pain to the patient’s comfort goal  Outcome: Progressing     Problem: Knowledge Deficit - Standard  Goal: Patient and family/care givers will demonstrate understanding of plan of care, disease process/condition, diagnostic tests and medications  Outcome: Progressing     Problem: Fall Risk  Goal: Patient will remain free from falls  Outcome: Progressing       Patient is not progressing towards the following goals:

## 2024-03-05 ENCOUNTER — HOSPITAL ENCOUNTER (OUTPATIENT)
Dept: LAB | Facility: MEDICAL CENTER | Age: 44
End: 2024-03-05
Attending: FAMILY MEDICINE
Payer: COMMERCIAL

## 2024-03-05 ENCOUNTER — OFFICE VISIT (OUTPATIENT)
Dept: MEDICAL GROUP | Facility: PHYSICIAN GROUP | Age: 44
End: 2024-03-05
Payer: COMMERCIAL

## 2024-03-05 VITALS
BODY MASS INDEX: 22.4 KG/M2 | WEIGHT: 169 LBS | RESPIRATION RATE: 16 BRPM | HEART RATE: 78 BPM | OXYGEN SATURATION: 97 % | SYSTOLIC BLOOD PRESSURE: 108 MMHG | TEMPERATURE: 98.3 F | HEIGHT: 73 IN | DIASTOLIC BLOOD PRESSURE: 60 MMHG

## 2024-03-05 DIAGNOSIS — E87.6 HYPOKALEMIA: ICD-10-CM

## 2024-03-05 DIAGNOSIS — R63.4 WEIGHT LOSS: ICD-10-CM

## 2024-03-05 DIAGNOSIS — R79.89 ABNORMAL CBC: ICD-10-CM

## 2024-03-05 DIAGNOSIS — K20.90 ESOPHAGITIS: ICD-10-CM

## 2024-03-05 DIAGNOSIS — R03.0 ELEVATED BLOOD PRESSURE READING: ICD-10-CM

## 2024-03-05 DIAGNOSIS — E11.65 TYPE 2 DIABETES MELLITUS WITH HYPERGLYCEMIA, WITHOUT LONG-TERM CURRENT USE OF INSULIN (HCC): ICD-10-CM

## 2024-03-05 LAB
ALBUMIN SERPL BCP-MCNC: 4.4 G/DL (ref 3.2–4.9)
ALBUMIN/GLOB SERPL: 1.8 G/DL
ALP SERPL-CCNC: 36 U/L (ref 30–99)
ALT SERPL-CCNC: 11 U/L (ref 2–50)
ANION GAP SERPL CALC-SCNC: 13 MMOL/L (ref 7–16)
AST SERPL-CCNC: 15 U/L (ref 12–45)
BASOPHILS # BLD AUTO: 0.4 % (ref 0–1.8)
BASOPHILS # BLD: 0.02 K/UL (ref 0–0.12)
BILIRUB SERPL-MCNC: <0.2 MG/DL (ref 0.1–1.5)
BUN SERPL-MCNC: 21 MG/DL (ref 8–22)
CALCIUM ALBUM COR SERPL-MCNC: 8.8 MG/DL (ref 8.5–10.5)
CALCIUM SERPL-MCNC: 9.1 MG/DL (ref 8.5–10.5)
CHLORIDE SERPL-SCNC: 103 MMOL/L (ref 96–112)
CO2 SERPL-SCNC: 23 MMOL/L (ref 20–33)
CREAT SERPL-MCNC: 0.81 MG/DL (ref 0.5–1.4)
EOSINOPHIL # BLD AUTO: 0.17 K/UL (ref 0–0.51)
EOSINOPHIL NFR BLD: 3 % (ref 0–6.9)
ERYTHROCYTE [DISTWIDTH] IN BLOOD BY AUTOMATED COUNT: 42.9 FL (ref 35.9–50)
GFR SERPLBLD CREATININE-BSD FMLA CKD-EPI: 113 ML/MIN/1.73 M 2
GLOBULIN SER CALC-MCNC: 2.4 G/DL (ref 1.9–3.5)
GLUCOSE SERPL-MCNC: 211 MG/DL (ref 65–99)
HCT VFR BLD AUTO: 40.8 % (ref 42–52)
HGB BLD-MCNC: 14.1 G/DL (ref 14–18)
IMM GRANULOCYTES # BLD AUTO: 0.01 K/UL (ref 0–0.11)
IMM GRANULOCYTES NFR BLD AUTO: 0.2 % (ref 0–0.9)
LYMPHOCYTES # BLD AUTO: 1.63 K/UL (ref 1–4.8)
LYMPHOCYTES NFR BLD: 29.1 % (ref 22–41)
MCH RBC QN AUTO: 31.1 PG (ref 27–33)
MCHC RBC AUTO-ENTMCNC: 34.6 G/DL (ref 32.3–36.5)
MCV RBC AUTO: 90.1 FL (ref 81.4–97.8)
MONOCYTES # BLD AUTO: 0.57 K/UL (ref 0–0.85)
MONOCYTES NFR BLD AUTO: 10.2 % (ref 0–13.4)
NEUTROPHILS # BLD AUTO: 3.21 K/UL (ref 1.82–7.42)
NEUTROPHILS NFR BLD: 57.1 % (ref 44–72)
NRBC # BLD AUTO: 0 K/UL
NRBC BLD-RTO: 0 /100 WBC (ref 0–0.2)
PLATELET # BLD AUTO: 253 K/UL (ref 164–446)
PMV BLD AUTO: 10.8 FL (ref 9–12.9)
POTASSIUM SERPL-SCNC: 4.3 MMOL/L (ref 3.6–5.5)
PROT SERPL-MCNC: 6.8 G/DL (ref 6–8.2)
RBC # BLD AUTO: 4.53 M/UL (ref 4.7–6.1)
SODIUM SERPL-SCNC: 139 MMOL/L (ref 135–145)
WBC # BLD AUTO: 5.6 K/UL (ref 4.8–10.8)

## 2024-03-05 PROCEDURE — 85025 COMPLETE CBC W/AUTO DIFF WBC: CPT

## 2024-03-05 PROCEDURE — 36415 COLL VENOUS BLD VENIPUNCTURE: CPT

## 2024-03-05 PROCEDURE — 99214 OFFICE O/P EST MOD 30 MIN: CPT | Performed by: FAMILY MEDICINE

## 2024-03-05 PROCEDURE — 80053 COMPREHEN METABOLIC PANEL: CPT

## 2024-03-05 PROCEDURE — 3074F SYST BP LT 130 MM HG: CPT | Performed by: FAMILY MEDICINE

## 2024-03-05 PROCEDURE — 3078F DIAST BP <80 MM HG: CPT | Performed by: FAMILY MEDICINE

## 2024-03-05 ASSESSMENT — FIBROSIS 4 INDEX: FIB4 SCORE: 1.12

## 2024-03-05 NOTE — PROGRESS NOTES
Subjective:     CC:   Chief Complaint   Patient presents with    Follow-Up     hospital       HPI:   Jose De Jesus presents today for follow-up from hospitalization.  Patient was admitted due to ketoacidosis and also prolonged vomiting.  Patient feels that he may have gotten food poisoning.  Patient did have a EGD done and he was noted to have esophagitis with linear erosions.  Patient did well during hospitalization he was started on Carafate along with a proton pump inhibitor.  Patient was referred to GI.  Unfortunately patient did not get a phone number to call I did give him the number to GI that he was referred to by the referral the hospitalist had written.  Patient states he is feeling well now.  Patient had concerns that he was started on amlodipine at 10 mg.  But his blood pressure looks good and he denies any dizziness or lightheadedness.  Patient is seeing Dr. Cassidy for his diabetes would recommend he contact his office due to fact he was admitted after his last visit with Dr. Cassidy    Past Medical History:   Diagnosis Date    Allergy     Diabetes ([facility]) 05/2018    oral meds    Infectious disease 08/2019    c-diff 9/18-11/18 pt took vanco, and is better, no active sxs currently    Pain 08/2019    Right Shoulder       Social History     Tobacco Use    Smoking status: Never    Smokeless tobacco: Never   Vaping Use    Vaping Use: Never used   Substance Use Topics    Alcohol use: Yes     Comment: 6 per month    Drug use: No       Current Outpatient Medications Ordered in Epic   Medication Sig Dispense Refill    amLODIPine (NORVASC) 10 MG Tab Take 1 Tablet by mouth every day. 30 Tablet 0    omeprazole (PRILOSEC) 20 MG delayed-release capsule Take 1 Capsule by mouth 2 times a day. 60 Capsule 1    sucralfate (CARAFATE) 1 GM/10ML Suspension Take 10 mL by mouth every 6 hours for 13 days. 520 mL 0    Levocetirizine Dihydrochloride (XYZAL ALLERGY 24HR) 5 MG Tab Take 5 mg by mouth every day.      alpha-lipoic acid 600  "MG Cap Take 600 mg by mouth every day at 6 PM.      DHEA 50 MG Cap Take 100 mg by mouth every day.      OZEMPIC, 0.25 OR 0.5 MG/DOSE, 2 MG/3ML Solution Pen-injector INJECT 0.5MG UNDER THE SKIN EVERY 7 DAYS 3 mL 11    SYNJARDY XR 12.5-1000 MG TABLET SR 24 HR TAKE 2 TABLETS BY MOUTH EVERY  Tablet 3    glycopyrrolate (ROBINUL) 1 MG Tab TAKE 1 TABLET BY MOUTH THREE TIMES A DAY (Patient taking differently: Take 1 mg by mouth 3 times a day.) 90 Tablet 5    glimepiride (AMARYL) 4 MG Tab TAKE 1 TABLET BY MOUTH BEFORE EVENING MEAL. 90 Tablet 3    pioglitazone (ACTOS) 30 MG Tab TAKE 1 TABLET BY MOUTH EVERY DAY 90 Tablet 3    Vitamin D3 5000 Unit (125 mcg) Tab Take 5,000 Units by mouth every day.      Multiple Vitamin (MULTIVITAMIN ADULT PO) Take 1 Tablet by mouth every day.      S-Adenosylmethionine (THEODORA-E PO) Take 1 Tablet by mouth every day.      fluticasone (FLONASE) 50 MCG/ACT nasal spray Spray 1 Spray in nose every day.       No current [facility]-ordered facility-administered medications on file.       Allergies:  Patient has no known allergies.    Health Maintenance: Completed    ROS:  Gen: no fevers/chills, looks like patient had lost weight on the last weight check but now is gaining back his weight.  Eyes: no changes in vision  ENT: no sore throat, no hearing loss, no bloody nose  Pulm: no sob, no cough  CV: no chest pain, no palpitations  GI: no nausea/vomiting, no diarrhea  : no dysuria  Neuro: no headaches, no numbness/tingling  Heme/Lymph: no easy bruising    Objective:     Exam:  /60 (BP Location: Left arm, Patient Position: Sitting, BP Cuff Size: Adult)   Pulse 78   Temp 36.8 °C (98.3 °F) (Temporal)   Resp 16   Ht 1.854 m (6' 1\")   Wt 76.7 kg (169 lb)   SpO2 97%   BMI 22.30 kg/m²  Body mass index is 22.3 kg/m².    Gen: Alert and oriented, No apparent distress.  Skin: Warm and dry.  No obvious lesions.  Eyes: Sclera wnl Pupils normal in size  Lungs: Normal effort, CTA bilaterally, no wheezes, " rhonchi, or rales  CV: Regular rate and rhythm. No murmurs, rubs, or gallops.  ABD: Soft non-tender no organomegaly  Musculoskeletal: Normal gait. No extremity cyanosis, clubbing, or edema.  Neuro: Oriented to person, place and time  Psych: Mood is wnl       Assessment & Plan:     43 y.o. male with the following -     1. Abnormal CBC  Recommend rechecking his CBC patient very agreeable with this  - CBC WITH DIFFERENTIAL; Future    2. Hypokalemia  Last potassium was slightly low we will recheck this also  - Comp Metabolic Panel; Future    3. Weight loss  Patient is gaining back the weight he lost during his hospitalization    4. Type 2 diabetes mellitus with hyperglycemia, without long-term current use of insulin (HCC)  I would recommend he contact Dr. Cassidy's office due to the fact that he saw Dr. Cassidy prior to this hospitalization to let him know what happened.    5. Esophagitis  Patient given the phone number to call GI to make appointment    6. Elevated blood pressure reading  Patient was placed on amlodipine during his hospitalizations blood pressure today is 108/60 patient denies any dizziness would recommend continuing this.  Patient is planning on discussing this with Dr. Cassidy.       Return in about 6 months (around 9/5/2024), or if symptoms worsen or fail to improve.  I did review patient's hospitalization along with labs that were done along with his EGD result    Please note that this dictation was created using voice recognition software. I have made every reasonable attempt to correct obvious errors, but I expect that there are errors of grammar and possibly content that I did not discover before finalizing the note.

## 2024-04-01 DIAGNOSIS — E11.9 CONTROLLED TYPE 2 DIABETES MELLITUS WITHOUT COMPLICATION, WITHOUT LONG-TERM CURRENT USE OF INSULIN (HCC): ICD-10-CM

## 2024-04-01 RX ORDER — SEMAGLUTIDE 1.34 MG/ML
1 INJECTION, SOLUTION SUBCUTANEOUS
Qty: 9 ML | Refills: 3 | Status: SHIPPED | OUTPATIENT
Start: 2024-04-01

## 2024-05-09 ENCOUNTER — TELEPHONE (OUTPATIENT)
Dept: ENDOCRINOLOGY | Facility: MEDICAL CENTER | Age: 44
End: 2024-05-09
Payer: COMMERCIAL

## 2024-05-09 NOTE — TELEPHONE ENCOUNTER
"PA Renewal for Ozempic 1mg/Dose (4mg/3mL) Sopn . (Quantity:3mL, Day Supply:28)  Insurance: [employer] [place]  Member ID:  [id number]  BIN: [id number]  PCN: [id number]  Group: [id number]   Ran Test claim via [facility]  and received message: \"Pharmacy Not Contracted With Plan On Date Of Service.\"     Prior Authorization has been submitted via Cover My Meds: Key [id number])  Will follow up in 24-48 business hours.     Thank you,  Magdalena Ellis, [facility]  Endocrinology Pharmacy Coordinator          "

## 2024-05-10 NOTE — TELEPHONE ENCOUNTER
Prior Authorization Renewal for Ozempic 1mg/Dose (4mg/3mL) Sopn  has been dismissed as PA approval from 2023 is still in effect.  Prior Authorization reference number: [id number]  Insurance: [employer] [place]  Effective dates: 5/1/2023- no end date  Copay: unknown   Is patient eligible to fill with [facility]? No, test claim rejected stating Pharmacy Not Contracted With Plan On Date Of Service..    Thank you,  Magdalena Ellis [facility]  Endocrinology Pharmacy Coordinator

## 2024-07-09 ENCOUNTER — HOSPITAL ENCOUNTER (OUTPATIENT)
Dept: LAB | Facility: MEDICAL CENTER | Age: 44
End: 2024-07-09
Attending: INTERNAL MEDICINE
Payer: COMMERCIAL

## 2024-07-09 DIAGNOSIS — L74.52 FOCAL HYPERHIDROSIS DUE TO FREY SYNDROME: ICD-10-CM

## 2024-07-09 DIAGNOSIS — E11.9 CONTROLLED TYPE 2 DIABETES MELLITUS WITHOUT COMPLICATION, WITHOUT LONG-TERM CURRENT USE OF INSULIN (HCC): ICD-10-CM

## 2024-07-09 DIAGNOSIS — E55.9 VITAMIN D DEFICIENCY: ICD-10-CM

## 2024-07-09 DIAGNOSIS — E78.5 DYSLIPIDEMIA: ICD-10-CM

## 2024-07-09 DIAGNOSIS — Z79.84 LONG TERM (CURRENT) USE OF ORAL HYPOGLYCEMIC DRUGS: ICD-10-CM

## 2024-07-09 LAB
25(OH)D3 SERPL-MCNC: 51 NG/ML (ref 30–100)
ALBUMIN SERPL BCP-MCNC: 4.4 G/DL (ref 3.2–4.9)
ALBUMIN/GLOB SERPL: 1.9 G/DL
ALP SERPL-CCNC: 50 U/L (ref 30–99)
ALT SERPL-CCNC: 23 U/L (ref 2–50)
ANION GAP SERPL CALC-SCNC: 11 MMOL/L (ref 7–16)
AST SERPL-CCNC: 21 U/L (ref 12–45)
BILIRUB SERPL-MCNC: 0.3 MG/DL (ref 0.1–1.5)
BUN SERPL-MCNC: 18 MG/DL (ref 8–22)
CALCIUM ALBUM COR SERPL-MCNC: 9.2 MG/DL (ref 8.5–10.5)
CALCIUM SERPL-MCNC: 9.5 MG/DL (ref 8.5–10.5)
CHLORIDE SERPL-SCNC: 105 MMOL/L (ref 96–112)
CHOLEST SERPL-MCNC: 183 MG/DL (ref 100–199)
CO2 SERPL-SCNC: 26 MMOL/L (ref 20–33)
CREAT SERPL-MCNC: 0.93 MG/DL (ref 0.5–1.4)
CREAT UR-MCNC: 92.79 MG/DL
FASTING STATUS PATIENT QL REPORTED: NORMAL
GFR SERPLBLD CREATININE-BSD FMLA CKD-EPI: 105 ML/MIN/1.73 M 2
GLOBULIN SER CALC-MCNC: 2.3 G/DL (ref 1.9–3.5)
GLUCOSE SERPL-MCNC: 148 MG/DL (ref 65–99)
HDLC SERPL-MCNC: 65 MG/DL
LDLC SERPL CALC-MCNC: 103 MG/DL
MICROALBUMIN UR-MCNC: 5.4 MG/DL
MICROALBUMIN/CREAT UR: 58 MG/G (ref 0–30)
POTASSIUM SERPL-SCNC: 4.5 MMOL/L (ref 3.6–5.5)
PROT SERPL-MCNC: 6.7 G/DL (ref 6–8.2)
SODIUM SERPL-SCNC: 142 MMOL/L (ref 135–145)
T4 FREE SERPL-MCNC: 1.18 NG/DL (ref 0.93–1.7)
TRIGL SERPL-MCNC: 77 MG/DL (ref 0–149)
TSH SERPL DL<=0.005 MIU/L-ACNC: 1.38 UIU/ML (ref 0.38–5.33)

## 2024-07-09 PROCEDURE — 84443 ASSAY THYROID STIM HORMONE: CPT

## 2024-07-09 PROCEDURE — 36415 COLL VENOUS BLD VENIPUNCTURE: CPT

## 2024-07-09 PROCEDURE — 82306 VITAMIN D 25 HYDROXY: CPT

## 2024-07-09 PROCEDURE — 82043 UR ALBUMIN QUANTITATIVE: CPT

## 2024-07-09 PROCEDURE — 84439 ASSAY OF FREE THYROXINE: CPT

## 2024-07-09 PROCEDURE — 82570 ASSAY OF URINE CREATININE: CPT

## 2024-07-09 PROCEDURE — 80061 LIPID PANEL: CPT

## 2024-07-09 PROCEDURE — 80053 COMPREHEN METABOLIC PANEL: CPT

## 2024-07-23 DIAGNOSIS — E11.65 TYPE 2 DIABETES MELLITUS WITH HYPERGLYCEMIA, WITHOUT LONG-TERM CURRENT USE OF INSULIN (HCC): ICD-10-CM

## 2024-07-25 ENCOUNTER — NON-PROVIDER VISIT (OUTPATIENT)
Dept: ENDOCRINOLOGY | Facility: MEDICAL CENTER | Age: 44
End: 2024-07-25
Attending: INTERNAL MEDICINE
Payer: COMMERCIAL

## 2024-07-25 VITALS
HEART RATE: 80 BPM | DIASTOLIC BLOOD PRESSURE: 66 MMHG | SYSTOLIC BLOOD PRESSURE: 112 MMHG | BODY MASS INDEX: 22.53 KG/M2 | HEIGHT: 73 IN | WEIGHT: 170 LBS | OXYGEN SATURATION: 96 % | RESPIRATION RATE: 17 BRPM

## 2024-07-25 DIAGNOSIS — E11.65 TYPE 2 DIABETES MELLITUS WITH HYPERGLYCEMIA, WITHOUT LONG-TERM CURRENT USE OF INSULIN (HCC): ICD-10-CM

## 2024-07-25 LAB
HBA1C MFR BLD: 7.3 % (ref ?–5.8)
POCT INT CON NEG: NEGATIVE
POCT INT CON POS: POSITIVE

## 2024-07-25 PROCEDURE — 99213 OFFICE O/P EST LOW 20 MIN: CPT | Performed by: INTERNAL MEDICINE

## 2024-07-25 PROCEDURE — 83036 HEMOGLOBIN GLYCOSYLATED A1C: CPT

## 2024-07-25 ASSESSMENT — FIBROSIS 4 INDEX: FIB4 SCORE: 0.71

## 2024-07-29 DIAGNOSIS — L74.52 FOCAL HYPERHIDROSIS DUE TO FREY SYNDROME: ICD-10-CM

## 2024-07-29 RX ORDER — GLYCOPYRROLATE 1 MG/1
TABLET ORAL
Qty: 90 TABLET | Refills: 5 | Status: SHIPPED | OUTPATIENT
Start: 2024-07-29

## 2024-09-05 ENCOUNTER — OFFICE VISIT (OUTPATIENT)
Dept: MEDICAL GROUP | Facility: PHYSICIAN GROUP | Age: 44
End: 2024-09-05
Payer: COMMERCIAL

## 2024-09-05 VITALS
DIASTOLIC BLOOD PRESSURE: 62 MMHG | BODY MASS INDEX: 22.43 KG/M2 | SYSTOLIC BLOOD PRESSURE: 104 MMHG | HEIGHT: 73 IN | TEMPERATURE: 97 F | HEART RATE: 77 BPM | RESPIRATION RATE: 16 BRPM | WEIGHT: 169.2 LBS | OXYGEN SATURATION: 99 %

## 2024-09-05 DIAGNOSIS — Z11.59 NEED FOR HEPATITIS C SCREENING TEST: ICD-10-CM

## 2024-09-05 DIAGNOSIS — J31.0 CHRONIC RHINITIS: ICD-10-CM

## 2024-09-05 DIAGNOSIS — E11.9 CONTROLLED TYPE 2 DIABETES MELLITUS WITHOUT COMPLICATION, WITHOUT LONG-TERM CURRENT USE OF INSULIN (HCC): ICD-10-CM

## 2024-09-05 PROCEDURE — 3078F DIAST BP <80 MM HG: CPT | Performed by: FAMILY MEDICINE

## 2024-09-05 PROCEDURE — 3074F SYST BP LT 130 MM HG: CPT | Performed by: FAMILY MEDICINE

## 2024-09-05 PROCEDURE — 99214 OFFICE O/P EST MOD 30 MIN: CPT | Performed by: FAMILY MEDICINE

## 2024-09-05 RX ORDER — MONTELUKAST SODIUM 10 MG/1
10 TABLET ORAL DAILY
Qty: 90 TABLET | Refills: 1 | Status: SHIPPED | OUTPATIENT
Start: 2024-09-05

## 2024-09-05 ASSESSMENT — FIBROSIS 4 INDEX: FIB4 SCORE: 0.71

## 2024-09-05 NOTE — PROGRESS NOTES
Subjective:     CC:   Chief Complaint   Patient presents with    Follow-Up       HPI:   Jose De Jesus presents today with a complaint of runny and stuffy nose for months.  Patient is utilizing 2 different medications to Flonase and a allergy pill but does not seem to help.  Patient originally wanted possibly a Kenalog shot.  Patient is a diabetic and if he is having this much issues with the congestion would recommend trying montelukast instead.  Patient is going to be seeing Dr. Kaur in February for his diabetes.  Patient is just blowing clear to may be slightly white material from his nose.    Past Medical History:   Diagnosis Date    Allergy     Diabetes (HCC) 05/2018    oral meds    Infectious disease 08/2019    c-diff 9/18-11/18 pt took vanco, and is better, no active sxs currently    Pain 08/2019    Right Shoulder       Social History     Tobacco Use    Smoking status: Never    Smokeless tobacco: Never   Vaping Use    Vaping status: Never Used   Substance Use Topics    Alcohol use: Yes     Comment: 6 per month    Drug use: No       Current Outpatient Medications Ordered in Epic   Medication Sig Dispense Refill    montelukast (SINGULAIR) 10 MG Tab Take 1 Tablet by mouth every day. 90 Tablet 1    glycopyrrolate (ROBINUL) 1 MG Tab TAKE 1 TABLET BY MOUTH THREE TIMES A DAY 90 Tablet 5    Levocetirizine Dihydrochloride (XYZAL ALLERGY 24HR) 5 MG Tab Take 5 mg by mouth every day.      alpha-lipoic acid 600 MG Cap Take 600 mg by mouth every day at 6 PM.      SYNJARDY XR 12.5-1000 MG TABLET SR 24 HR TAKE 2 TABLETS BY MOUTH EVERY  Tablet 3    glimepiride (AMARYL) 4 MG Tab TAKE 1 TABLET BY MOUTH BEFORE EVENING MEAL. 90 Tablet 3    pioglitazone (ACTOS) 30 MG Tab TAKE 1 TABLET BY MOUTH EVERY DAY 90 Tablet 3    Vitamin D3 5000 Unit (125 mcg) Tab Take 5,000 Units by mouth every day.      Multiple Vitamin (MULTIVITAMIN ADULT PO) Take 1 Tablet by mouth every day.      S-Adenosylmethionine (THEODORA-E PO) Take 1 Tablet by mouth  "every day.      fluticasone (FLONASE) 50 MCG/ACT nasal spray Spray 1 Spray in nose every day.       No current [facility]-ordered facility-administered medications on file.       Allergies:  Patient has no known allergies.    Health Maintenance: Completed    ROS:  Gen: no fevers/chills, no changes in weight  Eyes: no changes in vision  ENT: no sore throat, no hearing loss, no bloody nose  Pulm: no sob, no cough  CV: no chest pain, no palpitations  GI: no nausea/vomiting, no diarrhea  : no dysuria  Neuro: no headaches, no numbness/tingling  Heme/Lymph: no easy bruising    Objective:     Exam:  /62 (BP Location: Left arm, Patient Position: Sitting, BP Cuff Size: Adult)   Pulse 77   Temp 36.1 °C (97 °F) (Temporal)   Resp 16   Ht 1.854 m (6' 1\")   Wt 76.7 kg (169 lb 3.2 oz)   SpO2 99%   BMI 22.32 kg/m²  Body mass index is 22.32 kg/m².    Gen: Alert and oriented, No apparent distress.  Skin: Warm and dry.  No obvious lesions.  Eyes: Sclera wnl Pupils normal in size  ENT: Canals wnl and TM are not red, there is some fluid behind both TMs that is clear mouth negative redness or exudates.  Patient does sound congested and sniffing.  Lungs: Normal effort, CTA bilaterally, no wheezes, rhonchi, or rales  CV: Regular rate and rhythm. No murmurs, rubs, or gallops.  Musculoskeletal: Normal gait. No extremity cyanosis, clubbing, or edema.  Neuro: Oriented to person, place and time  Psych: Mood is wnl         Assessment & Plan:     43 y.o. male with the following -     1. Controlled type 2 diabetes mellitus without complication, without long-term current use of insulin (HCC)  Patient does have appointment with Dr. Cassidy coming up in February he feels like his diabetes is under better control.  Patient should follow-up with Dr. Cassidy if he notices any issues.    2. Chronic rhinitis  I recommend starting him on Singulair did warn patient takes 1 to 2 weeks for it to kick in.  I should see him back for follow-up if he " has any issues patient would like just to see me back in 6 months.    3. Need for hepatitis C screening test  Patient is agreeable to go ahead and get this test done  - HEP C VIRUS ANTIBODY; Future    Other orders  - montelukast (SINGULAIR) 10 MG Tab; Take 1 Tablet by mouth every day.  Dispense: 90 Tablet; Refill: 1       Return in about 6 months (around 3/5/2025), or if symptoms worsen or fail to improve.    Please note that this dictation was created using voice recognition software. I have made every reasonable attempt to correct obvious errors, but I expect that there are errors of grammar and possibly content that I did not discover before finalizing the note.

## 2024-10-23 DIAGNOSIS — E11.8 TYPE 2 DIABETES MELLITUS WITH COMPLICATION, WITHOUT LONG-TERM CURRENT USE OF INSULIN (HCC): ICD-10-CM

## 2024-10-23 DIAGNOSIS — E11.9 CONTROLLED TYPE 2 DIABETES MELLITUS WITHOUT COMPLICATION, WITHOUT LONG-TERM CURRENT USE OF INSULIN (HCC): ICD-10-CM

## 2024-10-24 RX ORDER — PIOGLITAZONEHYDROCHLORIDE 30 MG/1
30 TABLET ORAL DAILY
Qty: 90 TABLET | Refills: 3 | Status: SHIPPED | OUTPATIENT
Start: 2024-10-24

## 2024-10-24 RX ORDER — GLIMEPIRIDE 4 MG/1
4 TABLET ORAL
Qty: 90 TABLET | Refills: 3 | Status: SHIPPED | OUTPATIENT
Start: 2024-10-24

## 2024-12-02 DIAGNOSIS — E11.9 CONTROLLED TYPE 2 DIABETES MELLITUS WITHOUT COMPLICATION, WITHOUT LONG-TERM CURRENT USE OF INSULIN (HCC): ICD-10-CM

## 2024-12-03 RX ORDER — EMPAGLIFLOZIN, METFORMIN HYDROCHLORIDE 12.5; 1 MG/1; MG/1
2 TABLET, EXTENDED RELEASE ORAL
Qty: 180 TABLET | Refills: 0 | Status: SHIPPED | OUTPATIENT
Start: 2024-12-03

## 2025-01-12 DIAGNOSIS — E11.9 CONTROLLED TYPE 2 DIABETES MELLITUS WITHOUT COMPLICATION, WITHOUT LONG-TERM CURRENT USE OF INSULIN (HCC): ICD-10-CM

## 2025-01-12 RX ORDER — EMPAGLIFLOZIN, METFORMIN HYDROCHLORIDE 12.5; 1 MG/1; MG/1
2 TABLET, EXTENDED RELEASE ORAL
Qty: 180 TABLET | Refills: 0 | Status: SHIPPED | OUTPATIENT
Start: 2025-01-12

## 2025-01-14 DIAGNOSIS — E11.9 CONTROLLED TYPE 2 DIABETES MELLITUS WITHOUT COMPLICATION, WITHOUT LONG-TERM CURRENT USE OF INSULIN (HCC): ICD-10-CM

## 2025-01-14 RX ORDER — SEMAGLUTIDE 1.34 MG/ML
1 INJECTION, SOLUTION SUBCUTANEOUS
Qty: 9 ML | Refills: 1 | Status: SHIPPED | OUTPATIENT
Start: 2025-01-14 | End: 2025-01-16

## 2025-01-14 RX ORDER — SEMAGLUTIDE 1.34 MG/ML
INJECTION, SOLUTION SUBCUTANEOUS
Qty: 9 ML | Refills: 1 | Status: SHIPPED
Start: 2025-01-14 | End: 2025-01-14

## 2025-01-16 DIAGNOSIS — E11.9 CONTROLLED TYPE 2 DIABETES MELLITUS WITHOUT COMPLICATION, WITHOUT LONG-TERM CURRENT USE OF INSULIN (HCC): ICD-10-CM

## 2025-01-16 RX ORDER — SEMAGLUTIDE 1.34 MG/ML
INJECTION, SOLUTION SUBCUTANEOUS
Qty: 9 ML | Refills: 3 | Status: SHIPPED
Start: 2025-01-16

## 2025-02-10 DIAGNOSIS — L74.52 FOCAL HYPERHIDROSIS DUE TO FREY SYNDROME: ICD-10-CM

## 2025-02-12 ENCOUNTER — OFFICE VISIT (OUTPATIENT)
Dept: ENDOCRINOLOGY | Facility: MEDICAL CENTER | Age: 45
End: 2025-02-12
Attending: INTERNAL MEDICINE
Payer: COMMERCIAL

## 2025-02-12 VITALS
BODY MASS INDEX: 23.19 KG/M2 | OXYGEN SATURATION: 100 % | SYSTOLIC BLOOD PRESSURE: 120 MMHG | RESPIRATION RATE: 17 BRPM | DIASTOLIC BLOOD PRESSURE: 78 MMHG | HEART RATE: 74 BPM | HEIGHT: 73 IN | WEIGHT: 175 LBS

## 2025-02-12 DIAGNOSIS — E11.29 TYPE 2 DIABETES MELLITUS WITH DIABETIC MICROALBUMINURIA, WITHOUT LONG-TERM CURRENT USE OF INSULIN (HCC): ICD-10-CM

## 2025-02-12 DIAGNOSIS — R80.9 ALBUMINURIA: ICD-10-CM

## 2025-02-12 DIAGNOSIS — E78.5 DYSLIPIDEMIA: ICD-10-CM

## 2025-02-12 DIAGNOSIS — Z79.84 LONG TERM (CURRENT) USE OF ORAL HYPOGLYCEMIC DRUGS: ICD-10-CM

## 2025-02-12 DIAGNOSIS — E55.9 VITAMIN D DEFICIENCY: ICD-10-CM

## 2025-02-12 DIAGNOSIS — R80.9 TYPE 2 DIABETES MELLITUS WITH DIABETIC MICROALBUMINURIA, WITHOUT LONG-TERM CURRENT USE OF INSULIN (HCC): ICD-10-CM

## 2025-02-12 DIAGNOSIS — L74.52 FOCAL HYPERHIDROSIS DUE TO FREY SYNDROME: ICD-10-CM

## 2025-02-12 LAB
HBA1C MFR BLD: 7.8 % (ref ?–5.8)
POCT INT CON NEG: NEGATIVE
POCT INT CON POS: POSITIVE

## 2025-02-12 PROCEDURE — 99214 OFFICE O/P EST MOD 30 MIN: CPT | Performed by: INTERNAL MEDICINE

## 2025-02-12 PROCEDURE — 3074F SYST BP LT 130 MM HG: CPT | Performed by: INTERNAL MEDICINE

## 2025-02-12 PROCEDURE — 83036 HEMOGLOBIN GLYCOSYLATED A1C: CPT | Performed by: INTERNAL MEDICINE

## 2025-02-12 PROCEDURE — 3078F DIAST BP <80 MM HG: CPT | Performed by: INTERNAL MEDICINE

## 2025-02-12 PROCEDURE — 99213 OFFICE O/P EST LOW 20 MIN: CPT | Performed by: INTERNAL MEDICINE

## 2025-02-12 RX ORDER — ATORVASTATIN CALCIUM 20 MG/1
20 TABLET, FILM COATED ORAL DAILY
Qty: 100 TABLET | Refills: 3 | Status: SHIPPED | OUTPATIENT
Start: 2025-02-12

## 2025-02-12 RX ORDER — EMPAGLIFLOZIN, METFORMIN HYDROCHLORIDE 12.5; 1 MG/1; MG/1
2 TABLET, EXTENDED RELEASE ORAL
Qty: 180 TABLET | Refills: 2 | Status: SHIPPED | OUTPATIENT
Start: 2025-02-12

## 2025-02-12 RX ORDER — LISINOPRIL 5 MG/1
5 TABLET ORAL DAILY
Qty: 100 TABLET | Refills: 3 | Status: SHIPPED | OUTPATIENT
Start: 2025-02-12 | End: 2026-03-19

## 2025-02-12 ASSESSMENT — FIBROSIS 4 INDEX: FIB4 SCORE: 0.73

## 2025-02-12 NOTE — PROGRESS NOTES
"RN-CDE Note    Subjective:   Endocrinology Clinic Progress Note  PCP: Amanda Alvarenga M.D.    HPI:  Jose De Jesus Banda is a 44 y.o. old patient who is seen today by the Diabetes Nurse Specialist for review of his type 2 diabetes.    Recent changes in health: denies any changes  DM:   Last A1c:   Lab Results   Component Value Date/Time    HBA1C 7.3 (A) 07/25/2024 10:21 AM      Previous A1c was 7.3 on 7/25/2024  A1C GOAL: < 7    Diabetes Medications:   Glimepiride 4 mg with evening meal  Ozempic 1 mg weekly  Pioglitazone 30 mg daily   Synjardy 12.5/1000 mg bid       Exercise: walking dog daily, ski boarding on days off  Diet: \"healthy\" diet  in general  Patient's body mass index is unknown because there is no height or weight on file. Exercise and nutrition counseling were performed at this visit.    Glucose monitoring frequency: testing bid.  Fasting in the 113-150 range and pm blood sugars in the  range.     Hypoglycemic episodes: no     Last Retinal Exam: on file and up-to-date  Daily Foot Exam: Yes   Foot Exam:  Monofilament: current.   Lab Results   Component Value Date/Time    MALBCRT 58 (H) 07/09/2024 09:02 AM    MICROALBUR 5.4 07/09/2024 09:02 AM        ACR Albumin/Creatinine Ratio goal <30     HTN:   Blood pressure goal <130/<80   Currently Rx ACE/ARB:  no    Dyslipidemia:    Lab Results   Component Value Date/Time    CHOLSTRLTOT 183 07/09/2024 09:02 AM     (H) 07/09/2024 09:02 AM    HDL 65 07/09/2024 09:02 AM    TRIGLYCERIDE 77 07/09/2024 09:02 AM         Currently Rx Statin:  no    He  reports that he has never smoked. He has never used smokeless tobacco.    Plan:     Discussed and educated on:   - All medications, side effects and compliance (discussed carefully)  - Annual eye examinations at Ophthalmology  - Foot Care:   - HbA1C: target  - Home glucose monitoring emphasized  - Weight control and daily exercise    Recommended medication changes: none     "

## 2025-02-12 NOTE — PROGRESS NOTES
CHIEF COMPLAINT: Patient is here for follow up of Type 2 Diabetes Mellitus.      HPI:     Jose De Jesus Banda is a 44 y.o. male with Type 2 Diabetes Mellitus here for follow up.      Labs from 2/12/2025 show A1c is 7.8%  Labs from 7/25/2024 show A1c is 7.3%  Labs from 1/25/2024 show A1c is 7.0%  Labs from 7/24/2023 show A1c was 6.9%  Labs from 12/6/2022 show A1c was 7.0%  Labs from 12/3/2021 show A1c was 6.6%  Labs from 5/19/2021 show a1c was 8.1%  Labs from 2/16/2021 show A1c was 8.1%  Labs from 10/14/2020 show a1c a1c was 7.4%  Labs from 7/5/2020 show A1c was 7.4%      He is a very slim gentleman and despite his slim habitus his C-peptide is detectable at 1.3 and yasmine 65 antibodies are negative ruling out BENSON and type 1 diabetes    He has chronic  gustatory sweating and he sweats on his forehead, neck and face whenever he eats cheese or peels and orange.    He denies any history of Bell's palsy or facial nerve injury  He has responded to oral glycopyrrolate          On follow up he is now on   Ozempic 1mg every other week ( due to GI se)   Synjardy XR 12.5/1000mg bid    Actos 30mg daily, and   Glimepiride 4 mg daily with dinner       He denies side effects with his medications        He has a history of hyperlipidemia but is not on a statin   He denies prior MI, CVA and PAD   Latest Reference Range & Units 07/09/24 09:02   Cholesterol,Tot 100 - 199 mg/dL 183   Triglycerides 0 - 149 mg/dL 77   HDL >=40 mg/dL 65   LDL <100 mg/dL 103 (H)       He denies a history of essential hypertension  He has new onset albuminuria  He is not on ACE   Latest Reference Range & Units 07/09/24 09:02   Micro Alb Creat Ratio 0 - 30 mg/g 58 (H)   Creatinine, Urine mg/dL 92.79   Microalbumin, Urine Random mg/dL 5.4         He had an eye exam on 12/2024  He also goes to [facility]    His baseline TSH is normal.          BG Diary:  Patient did not bring glucose meter    Weight has been stable    Diabetes Complications   Retinopathy: Known  retinopathy.  Last eye exam: 12/2024  Neuropathy: Denies paresthesias or numbness in hands or feet. Denies any foot wounds.  Exercise: Minimal.  Diet: Fair.  Patient's medications, allergies, and social histories were reviewed and updated as appropriate.    ROS:     CONS:     No fever, no chills   EYES:     No diplopia, no blurry vision   CV:           No chest pain, no palpitations   PULM:     No SOB, no cough, no hemoptysis.   GI:            No nausea, no vomiting, no diarrhea, no constipation   ENDO:     No polyuria, no polydipsia, no heat intolerance, no cold intolerance       Past Medical History:  Problem List:  2024-09: Chronic rhinitis  2024-02: Diabetic ketosis ([facility])  2024-02: Dehydration  2024-02: Type 2 diabetes mellitus with hyperglycemia, without long-  term current use of insulin ([facility])  2024-02: Hematemesis  2024-02: Esophagitis  2024-02: High anion gap metabolic disturbance  2024-02: Elevated blood pressure reading  2024-02: Abnormal EKG  2023-08: Weight loss  2022-07: Wellness examination  2021-12: Focal hyperhidrosis due to Any syndrome  2021-02: Chronic left shoulder pain  2021-02: Diabetic polyneuropathy ([facility])  2020-11: Dyslipidemia  2020-11: Long term (current) use of oral hypoglycemic drugs  2018-02: Encounter to establish care with new doctor  2018-02: Need for vaccination  2018-02: Controlled type 2 diabetes mellitus without complication,   without long-term current use of insulin ([facility])  2018-02: Hypertriglyceridemia      Past Surgical History:  Past Surgical History:   Procedure Laterality Date    SC UPPER GI ENDOSCOPY,DIAGNOSIS N/A 2/27/2024    Procedure: GASTROSCOPY;  Surgeon: Cece Ding M.D.;  Location: SURGERY SAME DAY [facility];  Service: Gastroenterology    PB MANIPULATN PLACIDOR JT W ANESTHESIA Right 8/22/2019    Procedure: MANIPULATION, SHOULDER;  Surgeon: Karon Fisher M.D.;  Location: SURGERY [facility];  Service: Orthopedics    [facility]/22/2019    Procedure: DECOMPRESSION, SHOULDER, ARTHROSCOPIC- SUBACROMIAL;  Surgeon: Karon Fisher M.D.;  Location: SURGERY [facility];  Service: Orthopedics    SHOULDER ARTHROSCOPY Right 8/22/2019    Procedure: ARTHROSCOPY, SHOULDER- CAPSULE RELEASE;  Surgeon: Karon Fisher M.D.;  Location: SURGERY [facility];  Service: Orthopedics    COLONOSCOPY  01/2019        Allergies:  Patient has no known allergies.     Social History:  Social History     Tobacco Use    Smoking status: Never    Smokeless tobacco: Never   Vaping Use    Vaping status: Never Used   Substance Use Topics    Alcohol use: Yes     Comment: 6 per month    Drug use: No        Family History:   family history includes Arthritis in his mother; Diabetes in his father and mother; Hypertension in his father; Lung Cancer in his maternal grandmother and paternal grandfather; Lung Disease in his father; Stroke in his mother.      PHYSICAL EXAM:   OBJECTIVE:  Vital signs: /78 (BP Location: Left arm, Patient Position: Sitting)   Pulse 74   Resp 17   Ht 1.854 m (6' 1\")   Wt 79.4 kg (175 lb)   SpO2 100%   BMI 23.09 kg/m²   GENERAL: Well-developed, well-nourished in no apparent distress.   EYE:  No ocular asymmetry, PERRLA  HENT: Pink, moist mucous membranes.    NECK: No thyromegaly.   CARDIOVASCULAR: Normal precordial impulse seen with normal carotid pulsation  LUNGS: Symmetrical chest expansion with normal phonation of voice   ABDOMEN: Obese abdomen with no visible organomegaly  EXTREMITIES: No clubbing, cyanosis, or edema.   NEUROLOGICAL: No gross focal motor abnormalities   LYMPH: No cervical adenopathy seen.   SKIN: No rashes, lesions.       Labs:  Lab Results   Component Value Date/Time    HBA1C 7.8 (A) 02/12/2025 03:00 PM        Lab Results   Component Value Date/Time    WBC 5.6 03/05/2024 01:49 PM    RBC 4.53 (L) 03/05/2024 01:49 PM    HEMOGLOBIN 14.1 03/05/2024 01:49 PM    MCV 90.1 03/05/2024 01:49 " PM    MCH 31.1 03/05/2024 01:49 PM    MCHC 34.6 03/05/2024 01:49 PM    RDW 42.9 03/05/2024 01:49 PM    MPV 10.8 03/05/2024 01:49 PM       Lab Results   Component Value Date/Time    SODIUM 142 07/09/2024 09:02 AM    POTASSIUM 4.5 07/09/2024 09:02 AM    CHLORIDE 105 07/09/2024 09:02 AM    CO2 26 07/09/2024 09:02 AM    ANION 11.0 07/09/2024 09:02 AM    GLUCOSE 148 (H) 07/09/2024 09:02 AM    BUN 18 07/09/2024 09:02 AM    CREATININE 0.93 07/09/2024 09:02 AM    CALCIUM 9.5 07/09/2024 09:02 AM    ASTSGOT 21 07/09/2024 09:02 AM    ALTSGPT 23 07/09/2024 09:02 AM    TBILIRUBIN 0.3 07/09/2024 09:02 AM    ALBUMIN 4.4 07/09/2024 09:02 AM    TOTPROTEIN 6.7 07/09/2024 09:02 AM    GLOBULIN 2.3 07/09/2024 09:02 AM    AGRATIO 1.9 07/09/2024 09:02 AM       Lab Results   Component Value Date/Time    CHOLSTRLTOT 171 05/09/2018 0734    TRIGLYCERIDE 167 (H) 05/09/2018 0734    HDL 45 05/09/2018 0734    LDL 93 05/09/2018 0734       Lab Results   Component Value Date/Time    MALBCRT 58 (H) 07/09/2024 09:02 AM    MICROALBUR 5.4 07/09/2024 09:02 AM        No results found for: TSHULTRASEN  No results found for: FREEDIR  No results found for: FREET3  No results found for: THYSTIMIG        ASSESSMENT/PLAN:     1. Type 2 diabetes mellitus with diabetic microalbuminuria, without long-term current use of insulin (HCC)  Uncontrolled secondary to hyperglycemia  Continue Synjardy,  Actos and glimepiride  Recommend that he take Ozempic every week instead of every other week  Follow-up in 3 months    2. Dyslipidemia  Unstable  Start atorvastatin for primary prevention of cardiovascular disease  Reviewed side effects of statins  Repeat fasting lipids in 3 months    3. Vitamin D deficiency  Stable check calcium and vitamin D with future labs    4. Long term (current) use of oral hypoglycemic drugs  Patient is on multiple oral agents for type 2 diabetes management    5. Albuminuria  Unstable  Start lisinopril due to persistent albuminuria  Reviewed side  effects of medication  Repeat urine albumin in 3 months    6. Focal hyperhidrosis due to Any syndrome  Stable  Continue oral glycopyrrolate        Return in about 3 months (around 5/12/2025).      Thank you kindly for allowing me to participate in the diabetes care plan for this patient.    Eldon Cassidy MD, [facility], [facility]      CC:   JOEL Zelaya.

## 2025-02-13 NOTE — Clinical Note
REFERRAL APPROVAL NOTICE         Sent on February 13, 2025                   Jose De Jesus Banda  [address] [place] [address]                   Dear Mr. Banda,    After a careful review of the medical information and benefit coverage, [facility] has processed your referral. See below for additional details.    If applicable, you must be actively enrolled with your insurance for coverage of the authorized service. If you have any questions regarding your coverage, please contact your insurance directly.    REFERRAL INFORMATION   Referral #:  [id number]  Referred-To Department    Referred-By Provider:  Maryam Cassidy M.D.   Endocrinology [facility]      [address]  [address]  [facility] [id number] [phone] [address]  [facility] [id number] [phone]    Referral Start Date:  02/12/2025  Referral End Date:   02/12/2026           SCHEDULING  If you do not already have an appointment, please call [phone] to make an appointment.   MORE INFORMATION  As a reminder, [facility] ownership has changed, meaning this location is now owned and operated by [facility]. As such, we want to clarify that our patients should expect to receive two separate bills for the services received at [facility] - one representing the [facility] facility fees as the owner of the establishment, and the other to represent the physician's services and subsequent fees. You can speak with your insurance carrier for a pricing estimate by calling the customer service number on the back of your card and ask about charges for a hospital outpatient visit.  If you do not already have a [employer] account, sign up at: [email or web address]  You can access your medical information, make appointments, see lab results, billing information, and more.  If you have questions regarding this referral, please contact  the [facility] Referrals department at:              [phone]. Monday - Friday 7:30AM - 5:00PM.      Sincerely,  [facility]

## 2025-02-20 RX ORDER — GLYCOPYRROLATE 1 MG/1
TABLET ORAL
Qty: 90 TABLET | Refills: 5 | Status: SHIPPED | OUTPATIENT
Start: 2025-02-20

## 2025-03-27 ENCOUNTER — OFFICE VISIT (OUTPATIENT)
Dept: MEDICAL GROUP | Facility: PHYSICIAN GROUP | Age: 45
End: 2025-03-27
Payer: COMMERCIAL

## 2025-03-27 VITALS
BODY MASS INDEX: 22.43 KG/M2 | DIASTOLIC BLOOD PRESSURE: 60 MMHG | OXYGEN SATURATION: 98 % | TEMPERATURE: 98.2 F | HEIGHT: 73 IN | SYSTOLIC BLOOD PRESSURE: 102 MMHG | RESPIRATION RATE: 16 BRPM | WEIGHT: 169.2 LBS | HEART RATE: 75 BPM

## 2025-03-27 DIAGNOSIS — Z30.09 VASECTOMY EVALUATION: ICD-10-CM

## 2025-03-27 DIAGNOSIS — E11.65 TYPE 2 DIABETES MELLITUS WITH HYPERGLYCEMIA, WITHOUT LONG-TERM CURRENT USE OF INSULIN (HCC): ICD-10-CM

## 2025-03-27 DIAGNOSIS — J31.0 CHRONIC RHINITIS: ICD-10-CM

## 2025-03-27 PROCEDURE — 3078F DIAST BP <80 MM HG: CPT | Performed by: FAMILY MEDICINE

## 2025-03-27 PROCEDURE — 99214 OFFICE O/P EST MOD 30 MIN: CPT | Performed by: FAMILY MEDICINE

## 2025-03-27 PROCEDURE — 3074F SYST BP LT 130 MM HG: CPT | Performed by: FAMILY MEDICINE

## 2025-03-27 RX ORDER — MONTELUKAST SODIUM 10 MG/1
10 TABLET ORAL DAILY
Qty: 90 TABLET | Refills: 3 | Status: SHIPPED | OUTPATIENT
Start: 2025-03-27

## 2025-03-27 ASSESSMENT — PATIENT HEALTH QUESTIONNAIRE - PHQ9: CLINICAL INTERPRETATION OF PHQ2 SCORE: 0

## 2025-03-27 ASSESSMENT — FIBROSIS 4 INDEX: FIB4 SCORE: 0.73

## 2025-03-27 NOTE — PROGRESS NOTES
Subjective:     CC:   Chief Complaint   Patient presents with    Follow-Up       HPI:   Jose De Jesus presents today for follow-up patient would like to get some more montelukast.  Patient does not know if it is really helping him at this time with his rhinitis.  I did mention a possible referral to allergist and patient was agreeable with this.  Patient is seeing endocrinology for his diabetes and sees them regularly to help control his blood sugars.  Patient also has radha disease and they are trying to help him with the sweating issue.  Patient is also wanting to see urology to discuss possibility of getting a vasectomy    Past Medical History:   Diagnosis Date    Allergy     Diabetes (HCC) 05/2018    oral meds    Infectious disease 08/2019    c-diff 9/18-11/18 pt took vanco, and is better, no active sxs currently    Pain 08/2019    Right Shoulder       Social History     Tobacco Use    Smoking status: Never    Smokeless tobacco: Never   Vaping Use    Vaping status: Never Used   Substance Use Topics    Alcohol use: Yes     Comment: 6 per month    Drug use: No       Current Outpatient Medications Ordered in Epic   Medication Sig Dispense Refill    montelukast (SINGULAIR) 10 MG Tab Take 1 Tablet by mouth every day. 90 Tablet 3    glycopyrrolate (ROBINUL) 1 MG Tab TAKE 1 TABLET BY MOUTH THREE TIMES A DAY 90 Tablet 5    lisinopril (PRINIVIL) 5 MG Tab Take 1 Tablet by mouth every day. 100 Tablet 3    atorvastatin (LIPITOR) 20 MG Tab Take 1 Tablet by mouth every day. 100 Tablet 3    Empagliflozin-metFORMIN HCl ER (SYNJARDY XR) 12.5-1000 MG TABLET SR 24 HR Take 2 Tablets by mouth every day. 180 Tablet 2    OZEMPIC, 1 MG/DOSE, 4 MG/3ML Solution Pen-injector INJECT 1 MG UNDER THE SKIN AS DIRECTED EVERY 7 DAYS. 9 mL 3    pioglitazone (ACTOS) 30 MG Tab TAKE 1 TABLET BY MOUTH EVERY DAY 90 Tablet 3    glimepiride (AMARYL) 4 MG Tab TAKE 1 TABLET BY MOUTH BEFORE EVENING MEAL. 90 Tablet 3    Levocetirizine Dihydrochloride (XYZAL  "ALLERGY 24HR) 5 MG Tab Take 5 mg by mouth every day.      alpha-lipoic acid 600 MG Cap Take 600 mg by mouth every day at 6 PM.      Vitamin D3 5000 Unit (125 mcg) Tab Take 5,000 Units by mouth every day.      Multiple Vitamin (MULTIVITAMIN ADULT PO) Take 1 Tablet by mouth every day.      S-Adenosylmethionine (THEODORA-E PO) Take 1 Tablet by mouth every day.      fluticasone (FLONASE) 50 MCG/ACT nasal spray Spray 1 Spray in nose every day.       No current [facility]-ordered facility-administered medications on file.       Allergies:  Patient has no known allergies.    Health Maintenance: Completed    ROS:  Gen: no fevers/chills, no changes in weight  Eyes: no changes in vision  ENT: no sore throat, no hearing loss, no bloody nose  Pulm: no sob, no cough  CV: no chest pain, no palpitations  GI: no nausea/vomiting, no diarrhea  : no dysuria  Neuro: no headaches, no numbness/tingling  Heme/Lymph: no easy bruising    Objective:     Exam:  /60 (BP Location: Left arm, Patient Position: Sitting, BP Cuff Size: Adult)   Pulse 75   Temp 36.8 °C (98.2 °F)   Resp 16   Ht 1.854 m (6' 1\")   Wt 76.7 kg (169 lb 3.2 oz)   SpO2 98%   BMI 22.32 kg/m²  Body mass index is 22.32 kg/m².    Gen: Alert and oriented, No apparent distress.  Skin: Warm and dry.  No obvious lesions.  Eyes: Sclera wnl Pupils normal in size  Lungs: Normal effort, CTA bilaterally, no wheezes, rhonchi, or rales  CV: Regular rate and rhythm. No murmurs, rubs, or gallops.  Musculoskeletal: Normal gait. No extremity cyanosis, clubbing, or edema.  Neuro: Oriented to person, place and time  Psych: Mood is wnl         Assessment & Plan:     44 y.o. male with the following -     1. Chronic rhinitis  I will continue the montelukast at this time referral was written for patient to see allergy specialist  - Referral to Allergy    2. Type 2 diabetes mellitus with hyperglycemia, without long-term current use of insulin ([facility])  Patient to continue to follow-up with " endocrinology    3. Vasectomy evaluation  Referral for vasectomy evaluation was written  - Referral to Urology    Other orders  - montelukast (SINGULAIR) 10 MG Tab; Take 1 Tablet by mouth every day.  Dispense: 90 Tablet; Refill: 3       Return in about 6 months (around 9/27/2025), or if symptoms worsen or fail to improve.    Please note that this dictation was created using voice recognition software. I have made every reasonable attempt to correct obvious errors, but I expect that there are errors of grammar and possibly content that I did not discover before finalizing the note.

## 2025-04-03 NOTE — Clinical Note
REFERRAL APPROVAL NOTICE         Sent on April 3, 2025                   Jose De Jesus Banda  [address] [address]                   Dear Mr. Banda,    After a careful review of the medical information and benefit coverage, [facility] has processed your referral. See below for additional details.    If applicable, you must be actively enrolled with your insurance for coverage of the authorized service. If you have any questions regarding your coverage, please contact your insurance directly.    REFERRAL INFORMATION   Referral #:  [id number]  Referred-To Provider    Referred-By Provider:  Allergy and Immunology    Amanda Alvarenga M.D.   [facility]      [address]  [facility] [id number] [phone] [id number]  [facility] [address]  [phone]    Referral Start Date:  03/27/2025  Referral End Date:   03/27/2026             SCHEDULING  If you do not already have an appointment, please call [phone] to make an appointment.     MORE INFORMATION  If you do not already have a [employer] account, sign up at: [email or web address]  You can access your medical information, make appointments, see lab results, billing information, and more.  If you have questions regarding this referral, please contact  the [facility] Referrals department at:             [phone]. Monday - Friday 8:00AM - 5:00PM.     Sincerely,    [facility]

## 2025-04-03 NOTE — Clinical Note
REFERRAL APPROVAL NOTICE         Sent on April 3, 2025                   Jose De Jesus Banda  [address] [address]                   Dear Mr. Banda,    After a careful review of the medical information and benefit coverage, [facility] has processed your referral. See below for additional details.    If applicable, you must be actively enrolled with your insurance for coverage of the authorized service. If you have any questions regarding your coverage, please contact your insurance directly.    REFERRAL INFORMATION   Referral #:  [id number]  Referred-To Department    Referred-By Provider:  Urology    Amanda Alvarenga M.D.   [facility]      [address]  [facility] [id number] [phone] [address]  [facility] [id number] [phone]    Referral Start Date:  03/27/2025  Referral End Date:   03/27/2026             SCHEDULING  If you do not already have an appointment, please call [phone] to make an appointment.     MORE INFORMATION  If you do not already have a [email or web address] account, sign up at: [email or web address]  You can access your medical information, make appointments, see lab results, billing information, and more.  If you have questions regarding this referral, please contact  the [facility] Referrals department at:             [phone]. Monday - Friday 8:00AM - 5:00PM.     Sincerely,    [facility]

## 2025-04-23 ENCOUNTER — OFFICE VISIT (OUTPATIENT)
Dept: UROLOGY | Facility: MEDICAL CENTER | Age: 45
End: 2025-04-23
Payer: COMMERCIAL

## 2025-04-23 DIAGNOSIS — Z98.52 VASECTOMY STATUS: ICD-10-CM

## 2025-04-23 DIAGNOSIS — N52.9 ERECTILE DYSFUNCTION, UNSPECIFIED ERECTILE DYSFUNCTION TYPE: ICD-10-CM

## 2025-04-23 DIAGNOSIS — N53.14 RETROGRADE EJACULATION: ICD-10-CM

## 2025-04-23 PROCEDURE — 99204 OFFICE O/P NEW MOD 45 MIN: CPT | Performed by: STUDENT IN AN ORGANIZED HEALTH CARE EDUCATION/TRAINING PROGRAM

## 2025-04-23 RX ORDER — TADALAFIL 5 MG/1
5 TABLET ORAL DAILY
Qty: 90 TABLET | Refills: 3 | Status: SHIPPED | OUTPATIENT
Start: 2025-04-23 | End: 2026-04-18

## 2025-04-23 NOTE — PROGRESS NOTES
Subjective  Jose De Jesus Banda is a 44 y.o. male who presents today for evaluation for vasectomy.    He does not have children. He has discussed this at length with his partner and he would like to proceed with vasectomy as a permanent form of contraception. He does not have  history of prior  history or surgeries. He is not on anticoagulation.  He does not have a history of a bleeding disorder. He does not have scrotal/testicular pain.     He has occasional difficulty with maintaining an erection. He sometimes notices a dorsal curvature to his penis but not when it is fully erect and he is able to have intercourse without difficulty. He has occasional retrograde ejaculation as well as pain in the low back with ejaculation. He denies urinary frequency, urgency, or nocturia.    Family History   Problem Relation Age of Onset    Arthritis Mother     Stroke Mother     Diabetes Mother     Hypertension Father     Diabetes Father     Lung Disease Father     Lung Cancer Maternal Grandmother     Lung Cancer Paternal Grandfather        Social History     Socioeconomic History    Marital status:      Spouse name: Not on file    Number of children: Not on file    Years of education: Not on file    Highest education level: Not on file   Occupational History    Not on file   Tobacco Use    Smoking status: Never    Smokeless tobacco: Never   Vaping Use    Vaping status: Never Used   Substance and Sexual Activity    Alcohol use: Yes     Comment: 6 per month    Drug use: No    Sexual activity: Yes     Partners: Female   Other Topics Concern    Not on file   Social History Narrative    Not on file     Social Drivers of Health     Financial Resource Strain: Not on file   Food Insecurity: Not on file   Transportation Needs: Not on file   Physical Activity: Not on file   Stress: Not on file   Social Connections: Not on file   Intimate Partner Violence: Not on file   Housing Stability: Not on file       Past Surgical History:    Procedure Laterality Date    FL UPPER GI ENDOSCOPY,DIAGNOSIS N/A 2/27/2024    Procedure: GASTROSCOPY;  Surgeon: Cece Ding M.D.;  Location: SURGERY SAME DAY [facility];  Service: Gastroenterology    PB MANIPULATN SHLDR JT W ANESTHESIA Right 8/22/2019    Procedure: MANIPULATION, SHOULDER;  Surgeon: Karon Fisher M.D.;  Location: SURGERY [facility];  Service: Orthopedics    FL SHLDR ARTHROSCOP,PART ACROMIOPLAS Right 8/22/2019    Procedure: DECOMPRESSION, SHOULDER, ARTHROSCOPIC- SUBACROMIAL;  Surgeon: Karon Fisher M.D.;  Location: SURGERY [facility];  Service: Orthopedics    SHOULDER ARTHROSCOPY Right 8/22/2019    Procedure: ARTHROSCOPY, SHOULDER- CAPSULE RELEASE;  Surgeon: Karon Fisher M.D.;  Location: SURGERY [facility];  Service: Orthopedics    COLONOSCOPY  01/2019       Past Medical History:   Diagnosis Date    Allergy     Diabetes (HCC) 05/2018    oral meds    Infectious disease 08/2019    c-diff 9/18-11/18 pt took vanco, and is better, no active sxs currently    Pain 08/2019    Right Shoulder       Current Outpatient Medications   Medication Sig Dispense Refill    tadalafil (CIALIS) 5 MG tablet Take 1 Tablet by mouth every day for 360 days. 90 Tablet 3    montelukast (SINGULAIR) 10 MG Tab Take 1 Tablet by mouth every day. 90 Tablet 3    glycopyrrolate (ROBINUL) 1 MG Tab TAKE 1 TABLET BY MOUTH THREE TIMES A DAY 90 Tablet 5    lisinopril (PRINIVIL) 5 MG Tab Take 1 Tablet by mouth every day. 100 Tablet 3    atorvastatin (LIPITOR) 20 MG Tab Take 1 Tablet by mouth every day. 100 Tablet 3    Empagliflozin-metFORMIN HCl ER (SYNJARDY XR) 12.5-1000 MG TABLET SR 24 HR Take 2 Tablets by mouth every day. 180 Tablet 2    OZEMPIC, 1 MG/DOSE, 4 MG/3ML Solution Pen-injector INJECT 1 MG UNDER THE SKIN AS DIRECTED EVERY 7 DAYS. 9 mL 3    pioglitazone (ACTOS) 30 MG Tab TAKE 1 TABLET BY MOUTH EVERY DAY 90 Tablet 3    glimepiride (AMARYL) 4 MG Tab TAKE 1 TABLET BY MOUTH BEFORE EVENING  "MEAL. 90 Tablet 3    Levocetirizine Dihydrochloride (XYZAL ALLERGY 24HR) 5 MG Tab Take 5 mg by mouth every day.      alpha-lipoic acid 600 MG Cap Take 600 mg by mouth every day at 6 PM.      Vitamin D3 5000 Unit (125 mcg) Tab Take 5,000 Units by mouth every day.      Multiple Vitamin (MULTIVITAMIN ADULT PO) Take 1 Tablet by mouth every day.      S-Adenosylmethionine (THEODORA-E PO) Take 1 Tablet by mouth every day.      fluticasone (FLONASE) 50 MCG/ACT nasal spray Spray 1 Spray in nose every day.       No current facility-administered medications for this visit.       No Known Allergies    Objective  There were no vitals taken for this visit.  Physical Exam  Constitutional:       Appearance: Normal appearance.   HENT:      Head: Normocephalic and atraumatic.   Pulmonary:      Effort: Pulmonary effort is normal.   Genitourinary:     Testes: Normal.      Comments: Left inguinal hernia. Tight cord bilaterally. Vas deferens isolated bilaterally.  Skin:     General: Skin is warm and dry.   Neurological:      General: No focal deficit present.      Mental Status: He is alert.   Psychiatric:         Mood and Affect: Mood normal.         Behavior: Behavior normal.         Labs:   BMP   Lab Results   Component Value Date/Time    SODIUM 142 07/09/2024 0902    POTASSIUM 4.5 07/09/2024 0902    CHLORIDE 105 07/09/2024 0902    CO2 26 07/09/2024 0902    GLUCOSE 148 (H) 07/09/2024 0902    BUN 18 07/09/2024 0902    CREATININE 0.93 07/09/2024 0902    CALCIUM 9.5 07/09/2024 0902       Imaging: none    Assessment    Vasectomy was discussed in detail including that this is a form a permanent sterilization/contraception, which has potential complications of bleeding, infection, pain, post-vasectomy pain syndrome, and recanalization.  A vasectomy packet was given to the patient and he was given oral and written instructions regarding the need to have a negative post-vasectomy semen analysis prior to being declared \"infertile\", and the need for " continued alternative forms of birth control until that time. Given his exam I recommend vasectomy in the OR.    We reviewed the various options for treatment of erectile dysfunction including treatment with phosphodiesterase 5 inhibitor, vacuum erection device, intracavernosal injection therapy, and inflatable penile prosthesis.  The patient is interested in proceeding with Tadalafil. I recommend a low daily dose of 5 mg.        Plan    Problem List Items Addressed This Visit    None  Visit Diagnoses         Erectile dysfunction, unspecified erectile dysfunction type            Start Tadalafil 5 mg daily  Schedule in OR for vasectomy

## 2025-04-23 NOTE — PATIENT INSTRUCTIONS
Vasectomy - What to Expect     Vasectomy Pre-procedure     You will be given a prescription for diazepam to alleviate anxiety during the procedure. If you elect to take this you must have a  to and from the procedure. Take this one hour prior to your appointment.      Shave the scrotum the day before your appointment.     You do not need to fast for your appointment, you may have breakfast/lunch as usual.     You may have a support person in the room during your procedure if desired.     Your appointment will take approximately one hour.      Vasectomy Post-Procedure     Advil (Ibuprofen) and Tylenol (Acetaminophen) as needed for pain control every 6 hours. It is recommended that you alternate between these medications every 3 hours for the first 24 hours.     Apply ice as needed, 10 minutes on and 10 minutes off, with a barrier between your skin and the ice. Continue this for the first 48 hours     You may shower after 48 hours     Supportive underwear (briefs or boxer briefs, no boxers)     No lifting > 15 pound for 10 days     No sexual activity for 10 days     No strenuous activity or straddle activities (bicycles, motorcycles, riding , etc) for 10 days     You will provide a semen sample in 3 months for semen analysis, you will be called with the result. You will need to bring this to the lab within one hour of collection.      Common Myths     Myth 1: Vasectomy is irreversible.     Debunked: While vasectomy is intended to be permanent, it is not entirely irreversible. Vasectomy reversal is a surgical procedure that can reconnect the vas deferens, allowing sperm to flow again. However, the success of reversal depends on various factors, including the time elapsed since the vasectomy and the skill of the surgeon.     Myth 2: Vasectomy causes erectile dysfunction or a decrease in sexual desire.     Debunked: Vasectomy does not affect sexual desire or the ability to have an erection. It only  prevents sperm from being present in the ejaculate. The sensation of orgasm and the pleasure of sexual intercourse remain unchanged.     Myth 3: Vasectomy increases the risk of prostate cancer.     Debunked: Multiple scientific studies have found no conclusive evidence linking vasectomy to an increased risk of prostate cancer. The idea that vasectomy causes prostate cancer is not supported by current medical research.     Myth 4: Vasectomy is immediately effective.     Debunked: Vasectomy is not immediately effective. After the procedure, it takes some time or a specific number of ejaculations to clear any remaining sperm from the reproductive system. Until a follow-up test confirms the absence of sperm in the ejaculate, alternative forms of contraception are necessary to prevent pregnancy.     Myth 5: Vasectomy leads to a higher risk of cardiovascular disease.     Debunked: There is no established link between vasectomy and an increased risk of cardiovascular disease. This is a baseless claim not supported by scientific evidence.     Myth 6: Vasectomy causes hormonal imbalances.     Debunked: Vasectomy does not affect hormone production or hormonal balance in the body. It solely blocks the passage of sperm through the vas deferens and has no impact on the production of hormones like testosterone.     Myth 7: Vasectomy is a painful and complicated procedure.     Debunked: Vasectomy is a relatively simple and quick outpatient procedure that is typically performed under local anesthesia or with mild sedation. While some individuals may experience mild discomfort and swelling in the scrotal area afterward, it is generally well-tolerated and not considered highly painful.     Myth 8: Vasectomy is emasculating or unmanly.     Debunked: Choosing to have a vasectomy is a personal decision that does not diminish one's masculinity. It is a responsible choice for individuals or couples who have decided not to have more  children.     Myth 9: Vasectomy negatively impacts sexual performance.     Debunked: Vasectomy does not affect sexual performance, libido, or the ability to achieve and sustain an erection. Men continue to experience sexual pleasure and function normally after the procedure.

## 2025-04-29 ENCOUNTER — TELEPHONE (OUTPATIENT)
Dept: SURGERY | Facility: MEDICAL CENTER | Age: 45
End: 2025-04-29
Payer: COMMERCIAL

## 2025-05-06 ENCOUNTER — HOSPITAL ENCOUNTER (OUTPATIENT)
Dept: LAB | Facility: MEDICAL CENTER | Age: 45
End: 2025-05-06
Attending: INTERNAL MEDICINE
Payer: COMMERCIAL

## 2025-05-06 DIAGNOSIS — R80.9 ALBUMINURIA: ICD-10-CM

## 2025-05-06 DIAGNOSIS — R80.9 TYPE 2 DIABETES MELLITUS WITH DIABETIC MICROALBUMINURIA, WITHOUT LONG-TERM CURRENT USE OF INSULIN (HCC): ICD-10-CM

## 2025-05-06 DIAGNOSIS — E11.29 TYPE 2 DIABETES MELLITUS WITH DIABETIC MICROALBUMINURIA, WITHOUT LONG-TERM CURRENT USE OF INSULIN (HCC): ICD-10-CM

## 2025-05-06 DIAGNOSIS — E78.5 DYSLIPIDEMIA: ICD-10-CM

## 2025-05-06 DIAGNOSIS — E55.9 VITAMIN D DEFICIENCY: ICD-10-CM

## 2025-05-06 DIAGNOSIS — Z79.84 LONG TERM (CURRENT) USE OF ORAL HYPOGLYCEMIC DRUGS: ICD-10-CM

## 2025-05-06 LAB
25(OH)D3 SERPL-MCNC: 47 NG/ML (ref 30–100)
ALBUMIN SERPL BCP-MCNC: 4.4 G/DL (ref 3.2–4.9)
ALBUMIN/GLOB SERPL: 1.7 G/DL
ALP SERPL-CCNC: 48 U/L (ref 30–99)
ALT SERPL-CCNC: 33 U/L (ref 2–50)
ANION GAP SERPL CALC-SCNC: 10 MMOL/L (ref 7–16)
AST SERPL-CCNC: 29 U/L (ref 12–45)
BILIRUB SERPL-MCNC: 0.4 MG/DL (ref 0.1–1.5)
BUN SERPL-MCNC: 21 MG/DL (ref 8–22)
CALCIUM ALBUM COR SERPL-MCNC: 9 MG/DL (ref 8.5–10.5)
CALCIUM SERPL-MCNC: 9.3 MG/DL (ref 8.5–10.5)
CHLORIDE SERPL-SCNC: 104 MMOL/L (ref 96–112)
CHOLEST SERPL-MCNC: 112 MG/DL (ref 100–199)
CO2 SERPL-SCNC: 26 MMOL/L (ref 20–33)
CREAT SERPL-MCNC: 1.02 MG/DL (ref 0.5–1.4)
GFR SERPLBLD CREATININE-BSD FMLA CKD-EPI: 94 ML/MIN/1.73 M 2
GLOBULIN SER CALC-MCNC: 2.6 G/DL (ref 1.9–3.5)
GLUCOSE SERPL-MCNC: 136 MG/DL (ref 65–99)
HDLC SERPL-MCNC: 56 MG/DL
LDLC SERPL CALC-MCNC: 41 MG/DL
NT-PROBNP SERPL IA-MCNC: 73 PG/ML (ref 0–125)
POTASSIUM SERPL-SCNC: 5.4 MMOL/L (ref 3.6–5.5)
PROT SERPL-MCNC: 7 G/DL (ref 6–8.2)
SODIUM SERPL-SCNC: 140 MMOL/L (ref 135–145)
T4 FREE SERPL-MCNC: 1.19 NG/DL (ref 0.93–1.7)
TRIGL SERPL-MCNC: 76 MG/DL (ref 0–149)
TSH SERPL-ACNC: 1.53 UIU/ML (ref 0.38–5.33)

## 2025-05-06 PROCEDURE — 82043 UR ALBUMIN QUANTITATIVE: CPT

## 2025-05-06 PROCEDURE — 84439 ASSAY OF FREE THYROXINE: CPT

## 2025-05-06 PROCEDURE — 36415 COLL VENOUS BLD VENIPUNCTURE: CPT

## 2025-05-06 PROCEDURE — 82570 ASSAY OF URINE CREATININE: CPT

## 2025-05-06 PROCEDURE — 83880 ASSAY OF NATRIURETIC PEPTIDE: CPT

## 2025-05-06 PROCEDURE — 84443 ASSAY THYROID STIM HORMONE: CPT

## 2025-05-06 PROCEDURE — 80053 COMPREHEN METABOLIC PANEL: CPT

## 2025-05-06 PROCEDURE — 80061 LIPID PANEL: CPT

## 2025-05-06 PROCEDURE — 82306 VITAMIN D 25 HYDROXY: CPT

## 2025-05-07 LAB
CREAT UR-MCNC: 83.7 MG/DL
MICROALBUMIN UR-MCNC: 3.2 MG/DL
MICROALBUMIN/CREAT UR: 38 MG/G (ref 0–30)

## 2025-05-09 ENCOUNTER — APPOINTMENT (OUTPATIENT)
Dept: ADMISSIONS | Facility: MEDICAL CENTER | Age: 45
End: 2025-05-09
Attending: STUDENT IN AN ORGANIZED HEALTH CARE EDUCATION/TRAINING PROGRAM
Payer: COMMERCIAL

## 2025-05-19 ENCOUNTER — PRE-ADMISSION TESTING (OUTPATIENT)
Dept: ADMISSIONS | Facility: MEDICAL CENTER | Age: 45
End: 2025-05-19
Attending: STUDENT IN AN ORGANIZED HEALTH CARE EDUCATION/TRAINING PROGRAM
Payer: COMMERCIAL

## 2025-05-21 ENCOUNTER — NON-PROVIDER VISIT (OUTPATIENT)
Dept: ENDOCRINOLOGY | Facility: MEDICAL CENTER | Age: 45
End: 2025-05-21
Attending: INTERNAL MEDICINE
Payer: COMMERCIAL

## 2025-05-21 VITALS
WEIGHT: 171.7 LBS | BODY MASS INDEX: 23.26 KG/M2 | HEART RATE: 86 BPM | DIASTOLIC BLOOD PRESSURE: 61 MMHG | SYSTOLIC BLOOD PRESSURE: 105 MMHG | OXYGEN SATURATION: 99 % | HEIGHT: 72 IN

## 2025-05-21 DIAGNOSIS — E11.29 TYPE 2 DIABETES MELLITUS WITH DIABETIC MICROALBUMINURIA, WITHOUT LONG-TERM CURRENT USE OF INSULIN (HCC): Primary | ICD-10-CM

## 2025-05-21 DIAGNOSIS — E11.9 CONTROLLED TYPE 2 DIABETES MELLITUS WITHOUT COMPLICATION, WITHOUT LONG-TERM CURRENT USE OF INSULIN (HCC): ICD-10-CM

## 2025-05-21 DIAGNOSIS — R80.9 TYPE 2 DIABETES MELLITUS WITH DIABETIC MICROALBUMINURIA, WITHOUT LONG-TERM CURRENT USE OF INSULIN (HCC): Primary | ICD-10-CM

## 2025-05-21 DIAGNOSIS — L74.52 FOCAL HYPERHIDROSIS DUE TO FREY SYNDROME: Primary | ICD-10-CM

## 2025-05-21 LAB
HBA1C MFR BLD: 7.4 % (ref ?–5.8)
POCT INT CON NEG: NEGATIVE
POCT INT CON POS: POSITIVE

## 2025-05-21 PROCEDURE — 83036 HEMOGLOBIN GLYCOSYLATED A1C: CPT

## 2025-05-21 PROCEDURE — 99213 OFFICE O/P EST LOW 20 MIN: CPT | Performed by: INTERNAL MEDICINE

## 2025-05-21 RX ORDER — GLIMEPIRIDE 4 MG/1
4 TABLET ORAL
Qty: 90 TABLET | Refills: 3 | Status: SHIPPED | OUTPATIENT
Start: 2025-05-21

## 2025-05-21 ASSESSMENT — FIBROSIS 4 INDEX: FIB4 SCORE: 0.84

## 2025-05-21 NOTE — PROGRESS NOTES
"Endocrinology Clinic Progress Note  PCP: Amanda Alvarenga M.D.    HPI:  Jose De Jesus Banda is a 44 y.o. old patient who is seen today by the Diabetes Nurse Specialist for review of  his type 2 diabetes.    Recent changes in health:  denies any changes  DM:   Last A1c:   Lab Results   Component Value Date/Time    HBA1C 7.4 (A) 05/21/2025 08:15 AM      Previous A1c was 7.8 on 2/12/2025  A1C GOAL: < 7    Diabetes Medications:   Glimepiride 4 mg daily  Synjardy 12.5/1000 mg bid  Ozemic 1 mg weekly      Exercise: states he started to work out and has been doing some yard work  Diet: \"healthy\" diet  in general  Patient's body mass index is 23.29 kg/m². Exercise and nutrition counseling were performed at this visit.    Glucose monitoring frequency: testing bid, states blood sugars usually less than 120  Hypoglycemic episodes: no  Last Retinal Exam: on file and up-to-date    Foot Exam:  Monofilament: current.       Plan:     Discussed and educated on:   - All medications, side effects and compliance (discussed carefully)  - HbA1C: target  - Home glucose monitoring emphasized  - Weight control and daily exercise    Recommended medication changes: none    "

## 2025-05-22 NOTE — Clinical Note
REFERRAL APPROVAL NOTICE         Sent on May 22, 2025                   Jose De Jesus Banda  [address] [facility] [address]                   Dear Mr. Banda,    After a careful review of the medical information and benefit coverage, [facility] has processed your referral. See below for additional details.    If applicable, you must be actively enrolled with your insurance for coverage of the authorized service. If you have any questions regarding your coverage, please contact your insurance directly.    REFERRAL INFORMATION   Referral #:  [id number]  Referred-To Department    Referred-By Provider:  Dermatology    Eldon Cassidy M.D.   [facility]      [address]  [address]  [place] [address]  [phone] [address]  [place] [id number] [phone]    Referral Start Date:  05/21/2025  Referral End Date:   05/21/2026             SCHEDULING  If you do not already have an appointment, please call [phone] to make an appointment.     MORE INFORMATION  If you do not already have a [employer] account, sign up at: [email or web address]  You can access your medical information, make appointments, see lab results, billing information, and more.  If you have questions regarding this referral, please contact  the [facility] Referrals department at:             [phone]. Monday - Friday 8:00AM - 5:00PM.     Sincerely,    [facility]     36.9

## 2025-06-03 ENCOUNTER — TELEPHONE (OUTPATIENT)
Dept: SURGERY | Facility: MEDICAL CENTER | Age: 45
End: 2025-06-03
Payer: COMMERCIAL

## 2025-06-05 ENCOUNTER — TELEPHONE (OUTPATIENT)
Dept: UROLOGY | Facility: MEDICAL CENTER | Age: 45
End: 2025-06-05
Payer: COMMERCIAL

## 2025-06-06 ENCOUNTER — ANESTHESIA EVENT (OUTPATIENT)
Dept: SURGERY | Facility: MEDICAL CENTER | Age: 45
End: 2025-06-06
Payer: COMMERCIAL

## 2025-06-06 ENCOUNTER — ANESTHESIA (OUTPATIENT)
Dept: SURGERY | Facility: MEDICAL CENTER | Age: 45
End: 2025-06-06
Payer: COMMERCIAL

## 2025-06-06 ENCOUNTER — HOSPITAL ENCOUNTER (OUTPATIENT)
Facility: MEDICAL CENTER | Age: 45
End: 2025-06-06
Attending: STUDENT IN AN ORGANIZED HEALTH CARE EDUCATION/TRAINING PROGRAM | Admitting: STUDENT IN AN ORGANIZED HEALTH CARE EDUCATION/TRAINING PROGRAM
Payer: COMMERCIAL

## 2025-06-06 VITALS
DIASTOLIC BLOOD PRESSURE: 99 MMHG | OXYGEN SATURATION: 98 % | WEIGHT: 162.92 LBS | HEART RATE: 69 BPM | HEIGHT: 73 IN | SYSTOLIC BLOOD PRESSURE: 159 MMHG | BODY MASS INDEX: 21.59 KG/M2 | TEMPERATURE: 97.1 F | RESPIRATION RATE: 16 BRPM

## 2025-06-06 LAB — GLUCOSE BLD STRIP.AUTO-MCNC: 185 MG/DL (ref 65–99)

## 2025-06-06 PROCEDURE — 160015 HCHG STAT PREOP MINUTES: Performed by: STUDENT IN AN ORGANIZED HEALTH CARE EDUCATION/TRAINING PROGRAM

## 2025-06-06 PROCEDURE — 160046 HCHG PACU - 1ST 60 MINS PHASE II: Performed by: STUDENT IN AN ORGANIZED HEALTH CARE EDUCATION/TRAINING PROGRAM

## 2025-06-06 PROCEDURE — 160009 HCHG ANES TIME/MIN: Performed by: STUDENT IN AN ORGANIZED HEALTH CARE EDUCATION/TRAINING PROGRAM

## 2025-06-06 PROCEDURE — 700111 HCHG RX REV CODE 636 W/ 250 OVERRIDE (IP): Performed by: ANESTHESIOLOGY

## 2025-06-06 PROCEDURE — 700111 HCHG RX REV CODE 636 W/ 250 OVERRIDE (IP): Performed by: STUDENT IN AN ORGANIZED HEALTH CARE EDUCATION/TRAINING PROGRAM

## 2025-06-06 PROCEDURE — 160048 HCHG OR STATISTICAL LEVEL 1-5: Performed by: STUDENT IN AN ORGANIZED HEALTH CARE EDUCATION/TRAINING PROGRAM

## 2025-06-06 PROCEDURE — 160039 HCHG SURGERY MINUTES - EA ADDL 1 MIN LEVEL 3: Performed by: STUDENT IN AN ORGANIZED HEALTH CARE EDUCATION/TRAINING PROGRAM

## 2025-06-06 PROCEDURE — 160028 HCHG SURGERY MINUTES - 1ST 30 MINS LEVEL 3: Performed by: STUDENT IN AN ORGANIZED HEALTH CARE EDUCATION/TRAINING PROGRAM

## 2025-06-06 PROCEDURE — 160035 HCHG PACU - 1ST 60 MINS PHASE I: Performed by: STUDENT IN AN ORGANIZED HEALTH CARE EDUCATION/TRAINING PROGRAM

## 2025-06-06 PROCEDURE — 700101 HCHG RX REV CODE 250: Performed by: ANESTHESIOLOGY

## 2025-06-06 PROCEDURE — 55250 REMOVAL OF SPERM DUCT(S): CPT | Performed by: STUDENT IN AN ORGANIZED HEALTH CARE EDUCATION/TRAINING PROGRAM

## 2025-06-06 PROCEDURE — 82962 GLUCOSE BLOOD TEST: CPT

## 2025-06-06 PROCEDURE — 160025 RECOVERY II MINUTES (STATS): Performed by: STUDENT IN AN ORGANIZED HEALTH CARE EDUCATION/TRAINING PROGRAM

## 2025-06-06 PROCEDURE — 160002 HCHG RECOVERY MINUTES (STAT): Performed by: STUDENT IN AN ORGANIZED HEALTH CARE EDUCATION/TRAINING PROGRAM

## 2025-06-06 RX ORDER — LIDOCAINE HYDROCHLORIDE 10 MG/ML
INJECTION, SOLUTION EPIDURAL; INFILTRATION; INTRACAUDAL; PERINEURAL
Status: DISCONTINUED
Start: 2025-06-06 | End: 2025-06-06 | Stop reason: HOSPADM

## 2025-06-06 RX ORDER — HALOPERIDOL 5 MG/ML
1 INJECTION INTRAMUSCULAR
Status: DISCONTINUED | OUTPATIENT
Start: 2025-06-06 | End: 2025-06-06 | Stop reason: HOSPADM

## 2025-06-06 RX ORDER — BUPIVACAINE HYDROCHLORIDE 2.5 MG/ML
INJECTION, SOLUTION EPIDURAL; INFILTRATION; INTRACAUDAL; PERINEURAL
Status: DISCONTINUED
Start: 2025-06-06 | End: 2025-06-06 | Stop reason: HOSPADM

## 2025-06-06 RX ORDER — DEXAMETHASONE SODIUM PHOSPHATE 4 MG/ML
INJECTION, SOLUTION INTRA-ARTICULAR; INTRALESIONAL; INTRAMUSCULAR; INTRAVENOUS; SOFT TISSUE PRN
Status: DISCONTINUED | OUTPATIENT
Start: 2025-06-06 | End: 2025-06-06 | Stop reason: SURG

## 2025-06-06 RX ORDER — DIPHENHYDRAMINE HYDROCHLORIDE 50 MG/ML
12.5 INJECTION, SOLUTION INTRAMUSCULAR; INTRAVENOUS
Status: DISCONTINUED | OUTPATIENT
Start: 2025-06-06 | End: 2025-06-06 | Stop reason: HOSPADM

## 2025-06-06 RX ORDER — HYDROMORPHONE HYDROCHLORIDE 1 MG/ML
0.1 INJECTION, SOLUTION INTRAMUSCULAR; INTRAVENOUS; SUBCUTANEOUS
Status: DISCONTINUED | OUTPATIENT
Start: 2025-06-06 | End: 2025-06-06 | Stop reason: HOSPADM

## 2025-06-06 RX ORDER — HYDROMORPHONE HYDROCHLORIDE 1 MG/ML
0.2 INJECTION, SOLUTION INTRAMUSCULAR; INTRAVENOUS; SUBCUTANEOUS
Status: DISCONTINUED | OUTPATIENT
Start: 2025-06-06 | End: 2025-06-06 | Stop reason: HOSPADM

## 2025-06-06 RX ORDER — MEPERIDINE HYDROCHLORIDE 25 MG/ML
12.5 INJECTION INTRAMUSCULAR; INTRAVENOUS; SUBCUTANEOUS
Status: DISCONTINUED | OUTPATIENT
Start: 2025-06-06 | End: 2025-06-06 | Stop reason: HOSPADM

## 2025-06-06 RX ORDER — BUPIVACAINE HYDROCHLORIDE 2.5 MG/ML
INJECTION, SOLUTION EPIDURAL; INFILTRATION; INTRACAUDAL; PERINEURAL
Status: DISCONTINUED | OUTPATIENT
Start: 2025-06-06 | End: 2025-06-06 | Stop reason: HOSPADM

## 2025-06-06 RX ORDER — HYDRALAZINE HYDROCHLORIDE 20 MG/ML
5 INJECTION INTRAMUSCULAR; INTRAVENOUS
Status: DISCONTINUED | OUTPATIENT
Start: 2025-06-06 | End: 2025-06-06 | Stop reason: HOSPADM

## 2025-06-06 RX ORDER — ONDANSETRON 2 MG/ML
4 INJECTION INTRAMUSCULAR; INTRAVENOUS
Status: DISCONTINUED | OUTPATIENT
Start: 2025-06-06 | End: 2025-06-06 | Stop reason: HOSPADM

## 2025-06-06 RX ORDER — OXYCODONE HCL 5 MG/5 ML
10 SOLUTION, ORAL ORAL
Status: DISCONTINUED | OUTPATIENT
Start: 2025-06-06 | End: 2025-06-06 | Stop reason: HOSPADM

## 2025-06-06 RX ORDER — ALBUTEROL SULFATE 5 MG/ML
2.5 SOLUTION RESPIRATORY (INHALATION)
Status: DISCONTINUED | OUTPATIENT
Start: 2025-06-06 | End: 2025-06-06 | Stop reason: HOSPADM

## 2025-06-06 RX ORDER — HYDROMORPHONE HYDROCHLORIDE 1 MG/ML
0.4 INJECTION, SOLUTION INTRAMUSCULAR; INTRAVENOUS; SUBCUTANEOUS
Status: DISCONTINUED | OUTPATIENT
Start: 2025-06-06 | End: 2025-06-06 | Stop reason: HOSPADM

## 2025-06-06 RX ORDER — ONDANSETRON 2 MG/ML
INJECTION INTRAMUSCULAR; INTRAVENOUS PRN
Status: DISCONTINUED | OUTPATIENT
Start: 2025-06-06 | End: 2025-06-06 | Stop reason: SURG

## 2025-06-06 RX ORDER — SODIUM CHLORIDE, SODIUM LACTATE, POTASSIUM CHLORIDE, CALCIUM CHLORIDE 600; 310; 30; 20 MG/100ML; MG/100ML; MG/100ML; MG/100ML
INJECTION, SOLUTION INTRAVENOUS CONTINUOUS
Status: DISCONTINUED | OUTPATIENT
Start: 2025-06-06 | End: 2025-06-06 | Stop reason: HOSPADM

## 2025-06-06 RX ORDER — CEFAZOLIN SODIUM 1 G/3ML
INJECTION, POWDER, FOR SOLUTION INTRAMUSCULAR; INTRAVENOUS PRN
Status: DISCONTINUED | OUTPATIENT
Start: 2025-06-06 | End: 2025-06-06 | Stop reason: SURG

## 2025-06-06 RX ORDER — OXYCODONE HCL 5 MG/5 ML
5 SOLUTION, ORAL ORAL
Status: DISCONTINUED | OUTPATIENT
Start: 2025-06-06 | End: 2025-06-06 | Stop reason: HOSPADM

## 2025-06-06 RX ORDER — LIDOCAINE HYDROCHLORIDE 20 MG/ML
INJECTION, SOLUTION EPIDURAL; INFILTRATION; INTRACAUDAL; PERINEURAL PRN
Status: DISCONTINUED | OUTPATIENT
Start: 2025-06-06 | End: 2025-06-06 | Stop reason: SURG

## 2025-06-06 RX ORDER — EPHEDRINE SULFATE 50 MG/ML
5 INJECTION, SOLUTION INTRAVENOUS
Status: DISCONTINUED | OUTPATIENT
Start: 2025-06-06 | End: 2025-06-06 | Stop reason: HOSPADM

## 2025-06-06 RX ADMIN — FENTANYL CITRATE 50 MCG: 50 INJECTION, SOLUTION INTRAMUSCULAR; INTRAVENOUS at 07:51

## 2025-06-06 RX ADMIN — DEXAMETHASONE SODIUM PHOSPHATE 4 MG: 4 INJECTION INTRA-ARTICULAR; INTRALESIONAL; INTRAMUSCULAR; INTRAVENOUS; SOFT TISSUE at 07:38

## 2025-06-06 RX ADMIN — ONDANSETRON 4 MG: 2 INJECTION INTRAMUSCULAR; INTRAVENOUS at 07:38

## 2025-06-06 RX ADMIN — LIDOCAINE HYDROCHLORIDE 100 MG: 20 INJECTION, SOLUTION EPIDURAL; INFILTRATION; INTRACAUDAL; PERINEURAL at 07:33

## 2025-06-06 RX ADMIN — PROPOFOL 200 MG: 10 INJECTION, EMULSION INTRAVENOUS at 07:33

## 2025-06-06 RX ADMIN — FENTANYL CITRATE 50 MCG: 50 INJECTION, SOLUTION INTRAMUSCULAR; INTRAVENOUS at 07:34

## 2025-06-06 RX ADMIN — CEFAZOLIN 2 G: 1 INJECTION, POWDER, FOR SOLUTION INTRAMUSCULAR; INTRAVENOUS at 07:33

## 2025-06-06 ASSESSMENT — PAIN DESCRIPTION - PAIN TYPE
TYPE: ACUTE PAIN;CHRONIC PAIN
TYPE: SURGICAL PAIN

## 2025-06-06 ASSESSMENT — FIBROSIS 4 INDEX: FIB4 SCORE: 0.84

## 2025-06-06 ASSESSMENT — PAIN SCALES - GENERAL: PAIN_LEVEL: 0

## 2025-06-06 ASSESSMENT — ENCOUNTER SYMPTOMS
CHILLS: 0
VOMITING: 0
NAUSEA: 0
FEVER: 0

## 2025-06-06 NOTE — ANESTHESIA PROCEDURE NOTES
Airway    Date/Time: 6/6/2025 7:34 AM    Performed by: Nikolas Espinoza M.D.  Authorized by: Nikolas Espinoza M.D.    Location:  OR  Urgency:  Elective  Indications for Airway Management:  Anesthesia      Spontaneous Ventilation: absent    Sedation Level:  Deep  Preoxygenated: Yes    Final Airway Type:  Supraglottic airway  Final Supraglottic Airway:  Standard LMA    SGA Size:  5  Number of Attempts at Approach:  1  Ventilation Between Attempts:  None  Number of Other Approaches Attempted:  0

## 2025-06-06 NOTE — OP REPORT
SURGEON: Dr. Karel Chowdhury      ANESTHESIA: General (general endotracheal tube)      PRE-OPERATIVE DIAGNOSIS: vasectomy status     POST-OPERATIVE DIAGNOSIS: Same      NAME OF PROCEDURE: Vasectomy    FINDINGS OF PROCEDURE:     Bilateral vasectomy       EBL: 1 cc      COMPLICATIONS: None      PATIENT CONDITION: stable      INDICATIONS: Jose De Jesus Banda is a 44 y.o. male who agreed to above procedure for permanent sterilization after complete discussion of risks, benefits, and alternatives. He understands the main risks to be bleeding, infection, taking too little or too much skin, possible urethral injury.      PROCEDURE:      After informed consent was obtained in the preoperative care unit, the patient was taken to the OR on a stretcher. The patient was properly identified and placed in supine position per OR protocol. The patient was given a prophylactic dose of ancef 2 grams. General (general endotracheal tube) was administered.  The genitalia were then prepped and draped in usual sterile fashion.  A timeout was performed with all parties in agreement.      The right and left vas deferens were identified. A cord block was performed bilaterally with 0.5% Marcaine. The right vas deferens was then isolated and local anesthetic was injected subcutaneously and deep towards the vas deferens itself. Sharp vasectomy dissectors were then used to puncture through the skin to the vas deferens. This was used to bluntly dissect on either side of the vas until it was mobilized enough to grasp with the vas ring clamp. The vas was brought up through the scrotal incision and the vasectomy dissector was used to clear off the sheila-vasal tissue. Once the vas was appropriately skeletonized, A hemostat was used to grab proximally and distally and The distal vas segment was transected with scissors. We then placed the Bovie tip into the lumen and then cauterized until the vas blanched. A 0.5 cm segment of the vas was removed. The  Bovie tip was placed into the vas lumen proximally and cauterized until the vas blanched. Electrocautery was used to drop the fascial tissue intervening between the two vas segments. A fascial interposition was performed using small metal clips and the field was evaluated for hemostasis. No active bleeding was seen. I turned my attention to the left-hand side and repeated the procedure as stated above. Both sides were evaluated for any signs of bleeding, and none was seen. The right vas segment was placed back into the scrotum, making sure it was well away from the skin. Electrocautery was then used to coagulate the surrounding dartos tissue. This was performed on the left-hand side as well. A 3-0 monocyrl suture was used to close the skin incisions with a horizontal mattress suture and covered with Dermabond. This concluded the procedure, the patient tolerated it well. The patient was taken to the PACU in stable condition.      DISPOSITION: The patient will be discharged home with plan for semen analysis in 3 months.

## 2025-06-06 NOTE — ANESTHESIA TIME REPORT
Anesthesia Start and Stop Event Times       Date Time Event    6/6/2025 0714 Ready for Procedure     0725 Anesthesia Start     0821 Anesthesia Stop          Responsible Staff  06/06/25      Name Role Begin End    Nikolas Espinoza M.D. Anesth 0725 0821          Overtime Reason:  no overtime (within assigned shift)    Comments:

## 2025-06-06 NOTE — H&P
Urology H&P    Date of Service  6/6/2025    Referring Physician  Karel Chowdhury M.D.    Consulting Physician  Karel Chowdhury M.D.    Reason for Consultation  vasectomy    History of Presenting Illness  44 y.o. male who presented 6/6/2025 for vasectomy. No n/v/f/c    Review of Systems  Review of Systems   Constitutional:  Negative for chills and fever.   Gastrointestinal:  Negative for nausea and vomiting.       Past Medical History   has a past medical history of Allergy, Diabetes (HCC) (05/2018), Infectious disease (08/2019), and Pain (08/2019).    Surgical History   has a past surgical history that includes colonoscopy (01/2019); pr manipulatn shldr jt w anesthesia (Right, 08/22/2019); pr shldr arthroscop,part acromioplas (Right, 08/22/2019); shoulder arthroscopy (Right, 08/22/2019); pr upper gi endoscopy,diagnosis (N/A, 02/27/2024); and arthroplasty, shoulder.    Family History  family history includes Arthritis in his mother; Diabetes in his father and mother; Hypertension in his father; Lung Cancer in his maternal grandmother and paternal grandfather; Lung Disease in his father; Stroke in his mother.    Social History   reports that he has never smoked. He has never used smokeless tobacco. He reports current alcohol use. He reports current drug use. Drugs: Inhaled and Oral.    Medications  Prior to Admission Medications   Prescriptions Last Dose Informant Patient Reported? Taking?   Empagliflozin-metFORMIN HCl ER (SYNJARDY XR) 12.5-1000 MG TABLET SR 24 HR 6/3/2025  No No   Sig: Take 2 Tablets by mouth every day.   Levocetirizine Dihydrochloride (XYZAL ALLERGY 24HR) 5 MG Tab 6/4/2025 Patient Yes No   Sig: Take 5 mg by mouth every day.   Multiple Vitamin (MULTIVITAMIN ADULT PO) 6/3/2025 Patient Yes No   Sig: Take 1 Tablet by mouth every day.   OZEMPIC, 1 MG/DOSE, 4 MG/3ML Solution Pen-injector 5/26/2025  No No   Sig: INJECT 1 MG UNDER THE SKIN AS DIRECTED EVERY 7 DAYS.   S-Adenosylmethionine (THEODORA-E PO) 6/1/2025  Patient Yes No   Sig: Take 1 Tablet by mouth every evening.   Vitamin D3 5000 Unit (125 mcg) Tab 6/3/2025 Patient Yes No   Sig: Take 5,000 Units by mouth every day.   alpha-lipoic acid 600 MG Cap 6/3/2025 Patient Yes No   Sig: Take 600 mg by mouth every day at 6 PM.   atorvastatin (LIPITOR) 20 MG Tab 6/3/2025  No No   Sig: Take 1 Tablet by mouth every day.   fluticasone (FLONASE) 50 MCG/ACT nasal spray 6/4/2025 Patient Yes No   Sig: Spray 1 Spray in nose every day.   glimepiride (AMARYL) 4 MG Tab 6/3/2025  No No   Sig: Take 1 Tablet by mouth before evening meal.   glycopyrrolate (ROBINUL) 1 MG Tab   No No   Sig: TAKE 1 TABLET BY MOUTH THREE TIMES A DAY   lisinopril (PRINIVIL) 5 MG Tab 6/3/2025  No No   Sig: Take 1 Tablet by mouth every day.   montelukast (SINGULAIR) 10 MG Tab 6/3/2025  No No   Sig: Take 1 Tablet by mouth every day.   pioglitazone (ACTOS) 30 MG Tab 6/3/2025  No No   Sig: TAKE 1 TABLET BY MOUTH EVERY DAY   tadalafil (CIALIS) 5 MG tablet 6/3/2025  No No   Sig: Take 1 Tablet by mouth every day for 360 days.   Patient not taking: Reported on 5/19/2025      Facility-Administered Medications: None       Allergies  Allergies[1]    Physical Exam  Temp:  [35.9 °C (96.6 °F)] 35.9 °C (96.6 °F)  Pulse:  [79] 79  Resp:  [18] 18  BP: (131)/(71) 131/71  SpO2:  [99 %] 99 %    Physical Exam  Constitutional:       Appearance: Normal appearance.   HENT:      Head: Normocephalic and atraumatic.   Pulmonary:      Effort: Pulmonary effort is normal.   Skin:     General: Skin is warm and dry.   Neurological:      General: No focal deficit present.      Mental Status: He is alert.   Psychiatric:         Mood and Affect: Mood normal.         Behavior: Behavior normal.         Fluids      Laboratory                          Imaging  No orders to display       Assessment/Plan  OR today for vasectomy  Service: Urology             [1] No Known Allergies

## 2025-06-06 NOTE — ANESTHESIA PREPROCEDURE EVALUATION
Case: [id number] Date/Time: 06/06/25 0715    Procedure: VASECTOMY BILATERAL    Pre-op diagnosis: VASECTOMY STATUS    Location: [facility] ROOM 25 / SURGERY SAME DAY [facility]    Surgeons: Karel Chowdhury M.D.            Relevant Problems   ENDO   (positive) Controlled type 2 diabetes mellitus without complication, without long-term current use of insulin (HCC)   (positive) Type 2 diabetes mellitus with hyperglycemia, without long-term current use of insulin (HCC)       Physical Exam    Airway   Mallampati: II  TM distance: >3 FB  Neck ROM: full       Cardiovascular - normal exam  Rhythm: regular  Rate: normal    (-) murmur     Dental - normal exam           Pulmonary - normal examBreath sounds clear to auscultation     Abdominal    Neurological - normal exam                   Anesthesia Plan    ASA 2       Plan - general       Airway plan will be LMA          Induction: intravenous    Postoperative Plan: Postoperative administration of opioids is intended.    Pertinent diagnostic labs and testing reviewed    Informed Consent:    Anesthetic plan and risks discussed with patient.    Use of blood products discussed with: patient whom consented to blood products.

## 2025-06-06 NOTE — ANESTHESIA POSTPROCEDURE EVALUATION
Patient: Jose De Jesus Banda    Procedure Summary       Date: 06/06/25 Room / Location: [facility] ROOM 25 / SURGERY SAME DAY [facility]    Anesthesia Start: 0725 Anesthesia Stop: 0821    Procedure: VASECTOMY BILATERAL (Bilateral: Scrotum) Diagnosis: (VASECTOMY STATUS)    Surgeons: Karel Chowdhury M.D. Responsible Provider: Nikolas Espinoza M.D.    Anesthesia Type: general ASA Status: 2            Final Anesthesia Type: general  Last vitals  BP   Blood Pressure: 131/71    Temp   35.9 °C (96.6 °F)    Pulse   79   Resp   18    SpO2   99 %      Anesthesia Post Evaluation    Patient location during evaluation: PACU  Patient participation: complete - patient participated  Level of consciousness: awake and alert  Pain score: 0    Airway patency: patent  Anesthetic complications: no  Cardiovascular status: hemodynamically stable  Respiratory status: acceptable  Hydration status: euvolemic    PONV: none          No notable events documented.     Nurse Pain Score: 0 (NPRS)

## 2025-06-06 NOTE — DISCHARGE INSTRUCTIONS
HOME CARE INSTRUCTIONS    ACTIVITY: Rest and take it easy for the first 24 hours.  A responsible adult is recommended to remain with you during that time.  It is normal to feel sleepy.  We encourage you to not do anything that requires balance, judgment or coordination.    FOR 24 HOURS DO NOT:  Drive, operate machinery or run household appliances.  Drink beer or alcoholic beverages.  Make important decisions or sign legal documents.    SPECIAL INSTRUCTIONS: PLEASE REFER TO ATTACHED HANDOUT    DIET: No restrictions, may resume normal diet as tolerated. To avoid nausea, slowly advance diet as tolerated, avoiding spicy or greasy foods for the first day.  Add more substantial food to your diet according to your physician's instructions.  INCREASE FLUIDS AND FIBER TO AVOID CONSTIPATION.    MEDICATIONS: Resume taking daily medication.  Take prescribed pain medication with food.  If no medication is prescribed, you may take non-aspirin pain medication if needed.  PAIN MEDICATION CAN BE VERY CONSTIPATING.  Take a stool softener or laxative such as senokot, pericolace, or milk of magnesia if needed.    Prescription: NONE    Last pain medication given: NONE    A follow-up appointment should be arranged with Dr WYNN; call to schedule.    You should CALL YOUR PHYSICIAN if you develop:  Fever greater than 101 degrees F.  Pain not relieved by medication, or persistent nausea or vomiting.  Excessive bleeding (blood soaking through dressing) or unexpected drainage from the wound.  Extreme redness or swelling around the incision site, drainage of pus or foul smelling drainage.  Inability to urinate or empty your bladder within 8 hours.  Problems with breathing or chest pain.    You should call 911 if you develop problems with breathing or chest pain.  If you are unable to contact your doctor or surgical center, you should go to the nearest emergency room or urgent care center.      MILD FLU-LIKE SYMPTOMS ARE NORMAL.  YOU MAY  EXPERIENCE GENERALIZED MUSCLE ACHES, THROAT IRRITATION, HEADACHE AND/OR SOME NAUSEA.    If any questions arise, call Dr WYNN at [phone]. If your doctor is not available, please feel free to call the Surgical Center at [phone].  The Center is open Monday through Friday from 7AM to 7PM.      A registered nurse may call you a few days after your surgery to see how you are doing after your procedure.    You may also receive a survey in the mail within the next two weeks and we ask that you take a few moments to complete the survey and return it to us.  Our goal is to provide you with very good care and we value your comments.     Depression / Suicide Risk    As you are discharged from this [facility] facility, it is important to learn how to keep safe from harming yourself.    Recognize the warning signs:  Abrupt changes in personality, positive or negative- including increase in energy   Giving away possessions  Change in eating patterns- significant weight changes-  positive or negative  Change in sleeping patterns- unable to sleep or sleeping all the time   Unwillingness or inability to communicate  Depression  Unusual sadness, discouragement and loneliness  Talk of wanting to die  Neglect of personal appearance   Rebelliousness- reckless behavior  Withdrawal from people/activities they love  Confusion- inability to concentrate     If you or a loved one observes any of these behaviors or has concerns about self-harm, here's what you can do:  Talk about it- your feelings and reasons for harming yourself  Remove any means that you might use to hurt yourself (examples: pills, rope, extension cords, firearm)  Get professional help from the community (Mental Health, Substance Abuse, psychological counseling)  Do not be alone:Call your Safe Contact- someone whom you trust who will be there for you.  Call your local CRISIS HOTLINE 131-2939 or 904-277-2198  Call your local Children's Mobile Crisis Response  Team [facility] (035) 053-5743 or [email or web address] Video.Kozio  Call the toll free National Suicide Prevention Hotlines   National Suicide Prevention Lifeline 084-975-JYBB (4128)  National [place] Line Network 800-SUICIDE (340-4250)    I acknowledge receipt and understanding of these Home Care instructions.

## 2025-07-07 ENCOUNTER — OFFICE VISIT (OUTPATIENT)
Dept: UROLOGY | Facility: MEDICAL CENTER | Age: 45
End: 2025-07-07
Payer: COMMERCIAL

## 2025-07-07 DIAGNOSIS — Z98.52 VASECTOMY STATUS: Primary | ICD-10-CM

## 2025-07-07 PROCEDURE — 99024 POSTOP FOLLOW-UP VISIT: CPT | Performed by: STUDENT IN AN ORGANIZED HEALTH CARE EDUCATION/TRAINING PROGRAM

## 2025-07-07 NOTE — PROGRESS NOTES
Subjective  CHIEF COMPLAINT:  Patient presents to the office today to discuss:  1. Post-vasectomy follow-up    PAST UROLOGICAL HISTORY:  The gentleman recently underwent a vasectomy. This visit serves as his postoperative follow-up to assess his recovery and to arrange for a post-procedure semen analysis to confirm sterility. There are no other significant past urological issues noted.    HPI TODAY 07/07/2025:  - Post-operative follow-up for vasectomy. Recovery has been smooth. Reports some mild residual tenderness over the incision sites, particularly with bending or pressure. Notes this is improving. No significant pain.  - Experiences retrograde ejaculation, which was present pre-operatively.  - A brief examination was performed. Mild tenderness noted with palpation of the surgical sites. Incisions are well-healed. No signs of infection or significant inflammation.    Family History   Problem Relation Age of Onset    Arthritis Mother     Stroke Mother     Diabetes Mother     Hypertension Father     Diabetes Father     Lung Disease Father     Lung Cancer Maternal Grandmother     Lung Cancer Paternal Grandfather        Social History     Socioeconomic History    Marital status:      Spouse name: Not on file    Number of children: Not on file    Years of education: Not on file    Highest education level: Not on file   Occupational History    Not on file   Tobacco Use    Smoking status: Never    Smokeless tobacco: Never   Vaping Use    Vaping status: Never Used   Substance and Sexual Activity    Alcohol use: Yes     Comment: 1 per weekend    Drug use: Yes     Types: Inhaled, Oral     Comment: marijuana - last used Tuesday 6/3/25    Sexual activity: Yes     Partners: Female   Other Topics Concern    Not on file   Social History Narrative    Not on file     Social Drivers of Health     Financial Resource Strain: Not on file   Food Insecurity: Not on file   Transportation Needs: Not on file   Physical Activity:  Not on file   Stress: Not on file   Social Connections: Not on file   Intimate Partner Violence: Not on file   Housing Stability: Not on file       Past Surgical History:   Procedure Laterality Date    FL REMOVAL OF SPERM DUCT(S) Bilateral 6/6/2025    Procedure: VASECTOMY BILATERAL;  Surgeon: Karel Chowdhury M.D.;  Location: SURGERY SAME DAY [facility];  Service: Urology    FL UPPER GI ENDOSCOPY,DIAGNOSIS N/A 02/27/2024    Procedure: GASTROSCOPY;  Surgeon: Cece Ding M.D.;  Location: SURGERY SAME DAY [facility];  Service: Gastroenterology    PB MANIPULATN SHLDR JT W ANESTHESIA Right 08/22/2019    Procedure: MANIPULATION, SHOULDER;  Surgeon: Karon Fisher M.D.;  Location: SURGERY [facility];  Service: Orthopedics    FL SHLDR ARTHROSCOP,PART ACROMIOPLAS Right 08/22/2019    Procedure: DECOMPRESSION, SHOULDER, ARTHROSCOPIC- SUBACROMIAL;  Surgeon: Karon Fisher M.D.;  Location: SURGERY [facility];  Service: Orthopedics    SHOULDER ARTHROSCOPY Right 08/22/2019    Procedure: ARTHROSCOPY, SHOULDER- CAPSULE RELEASE;  Surgeon: Karon Fisher M.D.;  Location: SURGERY [facility];  Service: Orthopedics    COLONOSCOPY  01/2019    ARTHROPLASTY, SHOULDER      left 2022       Past Medical History:   Diagnosis Date    Allergy     Diabetes (HCC) 05/2018    oral meds    Infectious disease 08/2019    c-diff 9/18-11/18 pt took vanco, and is better, no active sxs currently    Pain 08/2019    Right Shoulder       Current Outpatient Medications   Medication Sig Dispense Refill    glimepiride (AMARYL) 4 MG Tab Take 1 Tablet by mouth before evening meal. 90 Tablet 3    montelukast (SINGULAIR) 10 MG Tab Take 1 Tablet by mouth every day. 90 Tablet 3    glycopyrrolate (ROBINUL) 1 MG Tab TAKE 1 TABLET BY MOUTH THREE TIMES A DAY 90 Tablet 5    lisinopril (PRINIVIL) 5 MG Tab Take 1 Tablet by mouth every day. 100 Tablet 3    atorvastatin (LIPITOR) 20 MG Tab Take 1 Tablet by mouth every day. 100 Tablet 3     Empagliflozin-metFORMIN HCl ER (SYNJARDY XR) 12.5-1000 MG TABLET SR 24 HR Take 2 Tablets by mouth every day. 180 Tablet 2    OZEMPIC, 1 MG/DOSE, 4 MG/3ML Solution Pen-injector INJECT 1 MG UNDER THE SKIN AS DIRECTED EVERY 7 DAYS. 9 mL 3    pioglitazone (ACTOS) 30 MG Tab TAKE 1 TABLET BY MOUTH EVERY DAY 90 Tablet 3    Levocetirizine Dihydrochloride (XYZAL ALLERGY 24HR) 5 MG Tab Take 5 mg by mouth every day.      alpha-lipoic acid 600 MG Cap Take 600 mg by mouth every day at 6 PM.      Vitamin D3 5000 Unit (125 mcg) Tab Take 5,000 Units by mouth every day.      Multiple Vitamin (MULTIVITAMIN ADULT PO) Take 1 Tablet by mouth every day.      S-Adenosylmethionine (THEODORA-E PO) Take 1 Tablet by mouth every evening.      fluticasone (FLONASE) 50 MCG/ACT nasal spray Spray 1 Spray in nose every day.      tadalafil (CIALIS) 5 MG tablet Take 1 Tablet by mouth every day for 360 days. (Patient not taking: Reported on 7/7/2025) 90 Tablet 3     No current facility-administered medications for this visit.       No Known Allergies    Objective  There were no vitals taken for this visit.  Physical Exam  Constitutional:       Appearance: Normal appearance.   HENT:      Head: Normocephalic and atraumatic.   Pulmonary:      Effort: Pulmonary effort is normal.   Genitourinary:     Testes: Normal.      Comments: Incisions well healed. Mild tenderness to palpation on the left cord/testicle  Skin:     General: Skin is warm and dry.   Neurological:      General: No focal deficit present.      Mental Status: He is alert.   Psychiatric:         Mood and Affect: Mood normal.         Behavior: Behavior normal.         Labs: none    Imaging: none    Assessment    ASSESSMENT AND PLAN:  This is a gentleman who is status post recent vasectomy, presenting for routine follow-up. He is recovering well with resolving tenderness.    1. Status Post Vasectomy (Z98.52)  - Assessment: Healing well from vasectomy with expected post-operative tenderness that is  improving.  - Plan: To schedule a formal semen analysis at the reproductive clinic to confirm azoospermia. He was provided with the contact number and a vasectomy pack. He will receive a follow-up call with the results once they are faxed to our office. For his retrograde ejaculation, it was recommended he try taking Sudafed for three days prior to collection to potentially reverse this. If unable to produce a sample, he was counseled that the clinic can perform post-ejaculatory bladder catheterization to obtain a specimen.  - Counseling: The importance of the semen analysis to confirm the success of the procedure was discussed. The patient understands the need for this test. Options for post-vasectomy pain were discussed, including Mobic or a steroid injection, which would not affect his blood sugar. He will monitor his symptoms and follow up if the tenderness worsens or fails to resolve.    Plan    Problem List Items Addressed This Visit    None  ORDERS:  Semen analysis.    FOLLOW UP:  Will arrange follow-up call upon receipt of semen analysis results. He is to return to the clinic as needed if symptoms worsen.    SHORT SUMMARY:  This gentleman is seen for a post-vasectomy follow-up. He is recovering well with improving tenderness. Plan is for him to schedule a semen analysis to confirm sterility, with instructions provided on managing retrograde ejaculation for sample collection.

## 2025-08-04 DIAGNOSIS — L74.52 FOCAL HYPERHIDROSIS DUE TO FREY SYNDROME: Primary | ICD-10-CM

## (undated) DEVICE — GLOVE BIOGEL PI INDICATOR SZ 7.0 SURGICAL PF LF - (50/BX 4BX/CA)

## (undated) DEVICE — KIT  I.V. START (100EA/CA)

## (undated) DEVICE — MASK ANESTHESIA ADULT  - (100/CA)

## (undated) DEVICE — TUBE CONNECTING SUCTION - CLEAR PLASTIC STERILE 72 IN (50EA/CA)

## (undated) DEVICE — GOWN WARMING STANDARD FLEX - (30/CA)

## (undated) DEVICE — LACTATED RINGERS INJ 1000 ML - (14EA/CA 60CA/PF)

## (undated) DEVICE — TUBING CLEARLINK DUO-VENT - C-FLO (48EA/CA)

## (undated) DEVICE — PROTECTOR ULNA NERVE - (36PR/CA)

## (undated) DEVICE — DRAPE LAPAROTOMY T SHEET - (12EA/CA)

## (undated) DEVICE — SET LEADWIRE 5 LEAD BEDSIDE DISPOSABLE ECG (1SET OF 5/EA)

## (undated) DEVICE — WATER IRRIGATION STERILE 1000ML (12EA/CA)

## (undated) DEVICE — SUCTION INSTRUMENT YANKAUER BULBOUS TIP W/O VENT (50EA/CA)

## (undated) DEVICE — DRAPETIBURON SHOULDER W/POUCH - (5EA/CA)

## (undated) DEVICE — SUTURE 3-0 MONOCRYL PLUS PS-2 - (12/BX)

## (undated) DEVICE — DRAPE U SPLIT IMP 54 X 76 - (24/CA)

## (undated) DEVICE — TOWEL STOP TIMEOUT SAFETY FLAG (40EA/CA)

## (undated) DEVICE — ELECTRODE DUAL RETURN W/ CORD - (50/PK)

## (undated) DEVICE — MASK OXYGEN VNYL ADLT MED CONC WITH 7 FOOT TUBING - (50EA/CA)

## (undated) DEVICE — DERMABOND ADVANCED - (12EA/BX)

## (undated) DEVICE — GLOVE BIOGEL INDICATOR SZ 7SURGICAL PF LTX - (50/BX 4BX/CA)

## (undated) DEVICE — SPIDER SHOULDER HOLDER (12EA/BX)

## (undated) DEVICE — CANISTER SUCTION RIGID RED 1500CC (40EA/CA)

## (undated) DEVICE — TAPE CLOTH MEDIPORE 6 INCH - (12RL/CA)

## (undated) DEVICE — CHLORAPREP 26 ML APPLICATOR - ORANGE TINT(25/CA)

## (undated) DEVICE — GLOVE BIOGEL SZ 7 SURGICAL PF LTX - (50PR/BX 4BX/CA)

## (undated) DEVICE — GLOVE SZ 7 BIOGEL PI MICRO - PF LF (50PR/BX 4BX/CA)

## (undated) DEVICE — BOVIE NEEDLE TIP 3CM COLORADO

## (undated) DEVICE — SET EXTENSION WITH 2 PORTS (48EA/CA) ***PART #2C8610 IS A SUBSTITUTE*****

## (undated) DEVICE — HUMID-VENT HEAT AND MOISTURE EXCHANGE- (50/BX)

## (undated) DEVICE — CANNULA TWIST IN 8.25MM X 7CM (5/BX)

## (undated) DEVICE — PACK SHOULDER ARTHROSCOPY SM - (2EA/CA)

## (undated) DEVICE — MASK PANORAMIC OXYGEN PRO2 (30EA/CA)

## (undated) DEVICE — BLOCK

## (undated) DEVICE — TUBE E-T HI-LO CUFF 7.0MM (10EA/PK)

## (undated) DEVICE — KIT ANESTHESIA W/CIRCUIT & 3/LT BAG W/FILTER (20EA/CA)

## (undated) DEVICE — SPONGE GAUZESTER 4 X 4 4PLY - (128PK/CA)

## (undated) DEVICE — SENSOR OXIMETER ADULT SPO2 RD SET (20EA/BX)

## (undated) DEVICE — BLOCK BITE MAXI DENTAL RETENTION RIM (100EA/BX)

## (undated) DEVICE — SODIUM CHL IRRIGATION 0.9% 1000ML (12EA/CA)

## (undated) DEVICE — SUTURE GENERAL

## (undated) DEVICE — SHAVER 5.5 BRL FORMULA 12 FLUTE (5EA/BX)

## (undated) DEVICE — ELECTRODE 850 FOAM ADHESIVE - HYDROGEL RADIOTRNSPRNT (50/PK)

## (undated) DEVICE — NEPTUNE 4 PORT MANIFOLD - (20/PK)

## (undated) DEVICE — SODIUM CHL. IRRIGATION 0.9% 3000ML (4EA/CA 65CA/PF)

## (undated) DEVICE — CANISTER SUCTION 3000ML MECHANICAL FILTER AUTO SHUTOFF MEDI-VAC NONSTERILE LF DISP (40EA/CA)

## (undated) DEVICE — PORT AUXILLARY WATER (50EA/BX)

## (undated) DEVICE — GLOVE BIOGEL SZ 7.5 SURGICAL PF LTX - (50PR/BX 4BX/CA)

## (undated) DEVICE — GLOVE, LITE (PAIR)

## (undated) DEVICE — PACK MINOR ROSEVIEW - (7EA/CA)

## (undated) DEVICE — CLOSURE SKIN STRIP 1/2 X 4 IN - (STERI STRIP) (50/BX 4BX/CA)

## (undated) DEVICE — BUTTON ENDOSCOPY DISPOSABLE

## (undated) DEVICE — CANNULA O2 COMFORT SOFT EAR ADULT 7 FT TUBING (50/CA)

## (undated) DEVICE — SUTURE 2-0 VICRYL PLUS CT-1 36 (36PK/BX)"

## (undated) DEVICE — KIT ROOM DECONTAMINATION

## (undated) DEVICE — GLOVE BIOGEL SZ 8 SURGICAL PF LTX - (50PR/BX 4BX/CA)

## (undated) DEVICE — NEEDLE W/FACET TIP DULL VERSION W/STIMULATION CABLE SONOPLEX 21G X 4 (10/EA)"

## (undated) DEVICE — BAG, SPONGE COUNT 50600

## (undated) DEVICE — SLEEVE VASO DVT COMPRESSION CALF MED - (10PR/CA)

## (undated) DEVICE — JELLY SURGILUBE STERILE TUBE 4.25 OZ (1/EA)

## (undated) DEVICE — CLIP SM INTNL HRZN TI ESCP LGT - (24EA/PK 25PK/BX)

## (undated) DEVICE — HEAD HOLDER JUNIOR/ADULT

## (undated) DEVICE — DRAPE IOBAN II INCISE 23X17 - (10EA/BX 4BX/CA)

## (undated) DEVICE — GLOVE BIOGEL INDICATOR SZ 7.5 SURGICAL PF LTX - (50PR/BX 4BX/CA)

## (undated) DEVICE — ARTHROWAND TURBOVAC 3.5/90 SCT

## (undated) DEVICE — FILM CASSETTE ENDO

## (undated) DEVICE — TRAY SRGPRP PVP IOD WT PRP - (20/CA)

## (undated) DEVICE — SENSOR SPO2 NEO LNCS ADHESIVE (20/BX) SEE USER NOTES

## (undated) DEVICE — GLOVE BIOGEL SZ 6.5 SURGICAL PF LTX (50PR/BX 4BX/CA)

## (undated) DEVICE — SHAVER4.0 RESECTOR FORMULA - (5EA/BX)

## (undated) DEVICE — SUTURE MONOCRYL PLUS UD 27IN(70CM) USP3-0(M2) S/A PS-2 PRM MP (36PK/BX)

## (undated) DEVICE — DRESSING 3X8 ADAPTIC GAUZE - NON-ADHERING (36/PK 6PK/BX)

## (undated) DEVICE — KIT CUSTOM PROCEDURE SINGLE FOR ENDO  (15/CA)